# Patient Record
Sex: FEMALE | Race: OTHER | Employment: UNEMPLOYED | URBAN - METROPOLITAN AREA
[De-identification: names, ages, dates, MRNs, and addresses within clinical notes are randomized per-mention and may not be internally consistent; named-entity substitution may affect disease eponyms.]

---

## 2017-08-15 ENCOUNTER — OFFICE VISIT (OUTPATIENT)
Dept: FAMILY MEDICINE CLINIC | Facility: CLINIC | Age: 47
End: 2017-08-15

## 2017-08-15 VITALS
DIASTOLIC BLOOD PRESSURE: 83 MMHG | HEIGHT: 60.5 IN | TEMPERATURE: 99 F | WEIGHT: 189.63 LBS | RESPIRATION RATE: 16 BRPM | HEART RATE: 83 BPM | SYSTOLIC BLOOD PRESSURE: 123 MMHG | BODY MASS INDEX: 36.27 KG/M2

## 2017-08-15 DIAGNOSIS — Z01.00 ENCOUNTER FOR VISION SCREENING: ICD-10-CM

## 2017-08-15 DIAGNOSIS — H93.11 TINNITUS, RIGHT: ICD-10-CM

## 2017-08-15 DIAGNOSIS — Z12.4 CERVICAL CANCER SCREENING: ICD-10-CM

## 2017-08-15 DIAGNOSIS — H91.91 HEARING LOSS OF RIGHT EAR, UNSPECIFIED HEARING LOSS TYPE: ICD-10-CM

## 2017-08-15 DIAGNOSIS — L60.8 DISCOLORED NAILS: ICD-10-CM

## 2017-08-15 DIAGNOSIS — Z00.00 ADULT GENERAL MEDICAL EXAM: Primary | ICD-10-CM

## 2017-08-15 PROBLEM — H93.19 TINNITUS: Status: ACTIVE | Noted: 2017-08-15

## 2017-08-15 PROCEDURE — 99212 OFFICE O/P EST SF 10 MIN: CPT | Performed by: FAMILY MEDICINE

## 2017-08-15 PROCEDURE — 99386 PREV VISIT NEW AGE 40-64: CPT | Performed by: FAMILY MEDICINE

## 2017-08-15 NOTE — PROGRESS NOTES
Patient ID: Jessy Watt Allen is a 52year old female.     HPI  Patient presents with:  Routine Physical  She does not have a gynecologist.  She did have a mammogram done in May 2017 and actually had a biopsy done as she was worried even thoug well-nourished. No distress. HENT:   Head: Normocephalic.    Right Ear: Tympanic membrane and ear canal normal.   Left Ear: Tympanic membrane and ear canal normal.   Mouth/Throat: Oropharynx is clear and moist and mucous membranes are normal.   Eyes: Conj DO  8/15/2017

## 2017-08-19 ENCOUNTER — LAB ENCOUNTER (OUTPATIENT)
Dept: LAB | Facility: HOSPITAL | Age: 47
End: 2017-08-19
Attending: FAMILY MEDICINE
Payer: MEDICAID

## 2017-08-19 DIAGNOSIS — Z00.00 ADULT GENERAL MEDICAL EXAM: ICD-10-CM

## 2017-08-19 LAB
ALBUMIN SERPL BCP-MCNC: 3.9 G/DL (ref 3.5–4.8)
ALBUMIN/GLOB SERPL: 1.4 {RATIO} (ref 1–2)
ALP SERPL-CCNC: 58 U/L (ref 32–100)
ALT SERPL-CCNC: 19 U/L (ref 14–54)
ANION GAP SERPL CALC-SCNC: 7 MMOL/L (ref 0–18)
AST SERPL-CCNC: 20 U/L (ref 15–41)
BASOPHILS # BLD: 0 K/UL (ref 0–0.2)
BASOPHILS NFR BLD: 1 %
BILIRUB SERPL-MCNC: 1.1 MG/DL (ref 0.3–1.2)
BUN SERPL-MCNC: 13 MG/DL (ref 8–20)
BUN/CREAT SERPL: 21.7 (ref 10–20)
CALCIUM SERPL-MCNC: 9.1 MG/DL (ref 8.5–10.5)
CHLORIDE SERPL-SCNC: 105 MMOL/L (ref 95–110)
CHOLEST SERPL-MCNC: 169 MG/DL (ref 110–200)
CO2 SERPL-SCNC: 27 MMOL/L (ref 22–32)
CREAT SERPL-MCNC: 0.6 MG/DL (ref 0.5–1.5)
EOSINOPHIL # BLD: 0.2 K/UL (ref 0–0.7)
EOSINOPHIL NFR BLD: 3 %
ERYTHROCYTE [DISTWIDTH] IN BLOOD BY AUTOMATED COUNT: 13.4 % (ref 11–15)
GLOBULIN PLAS-MCNC: 2.8 G/DL (ref 2.5–3.7)
GLUCOSE SERPL-MCNC: 106 MG/DL (ref 70–99)
HCT VFR BLD AUTO: 41.6 % (ref 35–48)
HDLC SERPL-MCNC: 44 MG/DL
HGB BLD-MCNC: 13.9 G/DL (ref 12–16)
LDLC SERPL CALC-MCNC: 114 MG/DL (ref 0–99)
LYMPHOCYTES # BLD: 1.1 K/UL (ref 1–4)
LYMPHOCYTES NFR BLD: 18 %
MCH RBC QN AUTO: 28.7 PG (ref 27–32)
MCHC RBC AUTO-ENTMCNC: 33.5 G/DL (ref 32–37)
MCV RBC AUTO: 85.7 FL (ref 80–100)
MONOCYTES # BLD: 0.4 K/UL (ref 0–1)
MONOCYTES NFR BLD: 7 %
NEUTROPHILS # BLD AUTO: 4.4 K/UL (ref 1.8–7.7)
NEUTROPHILS NFR BLD: 71 %
NONHDLC SERPL-MCNC: 125 MG/DL
OSMOLALITY UR CALC.SUM OF ELEC: 289 MOSM/KG (ref 275–295)
PLATELET # BLD AUTO: 260 K/UL (ref 140–400)
PMV BLD AUTO: 8.2 FL (ref 7.4–10.3)
POTASSIUM SERPL-SCNC: 4.9 MMOL/L (ref 3.3–5.1)
PROT SERPL-MCNC: 6.7 G/DL (ref 5.9–8.4)
RBC # BLD AUTO: 4.85 M/UL (ref 3.7–5.4)
SODIUM SERPL-SCNC: 139 MMOL/L (ref 136–144)
TRIGL SERPL-MCNC: 57 MG/DL (ref 1–149)
TSH SERPL-ACNC: 3.29 UIU/ML (ref 0.45–5.33)
WBC # BLD AUTO: 6.2 K/UL (ref 4–11)

## 2017-08-19 PROCEDURE — 85025 COMPLETE CBC W/AUTO DIFF WBC: CPT

## 2017-08-19 PROCEDURE — 80061 LIPID PANEL: CPT

## 2017-08-19 PROCEDURE — 36415 COLL VENOUS BLD VENIPUNCTURE: CPT

## 2017-08-19 PROCEDURE — 84443 ASSAY THYROID STIM HORMONE: CPT

## 2017-08-19 PROCEDURE — 80053 COMPREHEN METABOLIC PANEL: CPT

## 2017-08-29 ENCOUNTER — OFFICE VISIT (OUTPATIENT)
Dept: OBGYN CLINIC | Facility: CLINIC | Age: 47
End: 2017-08-29

## 2017-08-29 VITALS
WEIGHT: 190 LBS | HEART RATE: 69 BPM | DIASTOLIC BLOOD PRESSURE: 85 MMHG | SYSTOLIC BLOOD PRESSURE: 134 MMHG | BODY MASS INDEX: 37 KG/M2

## 2017-08-29 DIAGNOSIS — R92.8 ABNORMAL MAMMOGRAM OF RIGHT BREAST: ICD-10-CM

## 2017-08-29 DIAGNOSIS — R32 URINARY INCONTINENCE, UNSPECIFIED TYPE: ICD-10-CM

## 2017-08-29 DIAGNOSIS — Z01.419 WOMEN'S ANNUAL ROUTINE GYNECOLOGICAL EXAMINATION: Primary | ICD-10-CM

## 2017-08-29 DIAGNOSIS — Z12.31 SCREENING MAMMOGRAM, ENCOUNTER FOR: ICD-10-CM

## 2017-08-29 DIAGNOSIS — N89.8 VAGINAL ITCHING: ICD-10-CM

## 2017-08-29 PROCEDURE — 99386 PREV VISIT NEW AGE 40-64: CPT | Performed by: OBSTETRICS & GYNECOLOGY

## 2017-08-30 NOTE — PROGRESS NOTES
Annual Gyn Exam    HPI Ms. Jacklyn iSnha is a new patient to me and is here for an annual gynecologic evaluation. She has a history of an abnormal mammogram with a normal diagnostic mammogram and breast biopsy done at [facility].     OB History urinary incontinence. Patient had a hysterectomy for benign indications. Neurological: Negative for headaches. Physical Exam  /85   Pulse 69   Wt 190 lb (86.2 kg)   Breastfeeding? No   BMI 36.50 kg/m²     Assessment and Plan  1.  Women's kristan

## 2017-08-31 ENCOUNTER — OFFICE VISIT (OUTPATIENT)
Dept: AUDIOLOGY | Facility: CLINIC | Age: 47
End: 2017-08-31

## 2017-08-31 ENCOUNTER — OFFICE VISIT (OUTPATIENT)
Dept: OTOLARYNGOLOGY | Facility: CLINIC | Age: 47
End: 2017-08-31

## 2017-08-31 VITALS
WEIGHT: 190 LBS | DIASTOLIC BLOOD PRESSURE: 62 MMHG | BODY MASS INDEX: 37 KG/M2 | SYSTOLIC BLOOD PRESSURE: 118 MMHG | TEMPERATURE: 98 F

## 2017-08-31 DIAGNOSIS — H93.11 TINNITUS, RIGHT: ICD-10-CM

## 2017-08-31 DIAGNOSIS — H90.41 SENSORINEURAL HEARING LOSS (SNHL) OF RIGHT EAR WITH UNRESTRICTED HEARING OF LEFT EAR: Primary | ICD-10-CM

## 2017-08-31 DIAGNOSIS — H90.41 SENSORINEURAL HEARING LOSS (SNHL) OF RIGHT EAR WITH UNRESTRICTED HEARING OF LEFT EAR: ICD-10-CM

## 2017-08-31 LAB
GENITAL VAGINOSIS SCREEN: NEGATIVE
TRICHOMONAS SCREEN: NEGATIVE

## 2017-08-31 PROCEDURE — 99212 OFFICE O/P EST SF 10 MIN: CPT | Performed by: OTOLARYNGOLOGY

## 2017-08-31 PROCEDURE — 92567 TYMPANOMETRY: CPT | Performed by: AUDIOLOGIST

## 2017-08-31 PROCEDURE — 92557 COMPREHENSIVE HEARING TEST: CPT | Performed by: AUDIOLOGIST

## 2017-08-31 PROCEDURE — 99243 OFF/OP CNSLTJ NEW/EST LOW 30: CPT | Performed by: OTOLARYNGOLOGY

## 2017-08-31 NOTE — PROGRESS NOTES
AUDIOGRAM     Ricky Early Darlene Villa was referred for testing by Tomás Fay for decreased hearing in the right ear and tinnitus. 2/19/1970  [id number]        Otoscopic inspection: right ear no cerumen; left ear no cerumen.        Te

## 2017-08-31 NOTE — PROGRESS NOTES
Bertin Quique Allen is a 52year old female.   Patient presents with:  Hearing Loss: patient presents for ringing and some hearing loss started about 2 years ago has used some drops       HISTORY OF PRESENT ILLNESS    She presents with a sudden h Constitutional Negative Fatigue, fever and weight loss. ENMT Negative Drooling. Eyes Negative Blurred vision and vision changes. Respiratory Negative Dyspnea and wheezing. Cardio Negative Chest pain, irregular heartbeat/palpitations and syncope. Left: Normal.     No current outpatient prescriptions on file. ASSESSMENT AND PLAN    1.  Sensorineural hearing loss (SNHL) of right ear with unrestricted hearing of left ear  Previously seen by Dr. Eduardo Whipple and recommendation of bone-anchored hearing aid

## 2017-09-01 ENCOUNTER — OFFICE VISIT (OUTPATIENT)
Dept: PHYSICAL THERAPY | Facility: HOSPITAL | Age: 47
End: 2017-09-01
Attending: OBSTETRICS & GYNECOLOGY
Payer: MEDICAID

## 2017-09-01 DIAGNOSIS — R32 URINARY INCONTINENCE, UNSPECIFIED TYPE: ICD-10-CM

## 2017-09-01 PROCEDURE — 97110 THERAPEUTIC EXERCISES: CPT

## 2017-09-01 PROCEDURE — 97530 THERAPEUTIC ACTIVITIES: CPT

## 2017-09-01 PROCEDURE — 97161 PT EVAL LOW COMPLEX 20 MIN: CPT

## 2017-09-01 NOTE — PROGRESS NOTES
PELVIC FLOOR EVALUATION:   Referring Physician: Dr. Ramin Gregory  Diagnosis: Urinary incontinence, unspecified type (R32), Stress incontinence of urine [N39.3]    Date of Onset: 1996, 2017 Date of Service: 9/1/2017     PATIENT SUMMARY   Mary May palpated and able to visualize significant for mild pelvic organ prolapse of the bladder due to previous hysterectomy in 2009.   Other findings include weakness of the lumbo-pelvic support muscles including B hip abductors, extensors, internal rotators and yes Partial hysterectomy     Smoker yes 1-2 cigarettes per day    Drinker yes 1-2 glasses per day   Diabetes no    Hypertension no Borderline    Hypercholestemia no Borderline    Hyperlipidemia no      OBSTETRIC/GYNECOLOGIC HISTORY  : 2  Para: 2 - she reports this will be fine or she will seek guidance from PCP  Physical Assistance Needed: Independent     POSTURE  Increased lumbar lordosis, level pelvis     RANGE OF MOTION  Lumbar AROM/PROM screen:  WNL without pain in all planes  Hip AROM/PROM scr restriction, trigger point and pain minimal restriction, trigger point and pain    Coccygeus WNL WNL    Piriformis WNL WNL    Obturator internus WNL WNL      Strength: 3+/5 MMT grade  Endurance: 3-4 seconds  Repetitions: 4 times  Quick contractions:  Di Augustine

## 2017-09-07 ENCOUNTER — OFFICE VISIT (OUTPATIENT)
Dept: PHYSICAL THERAPY | Facility: HOSPITAL | Age: 47
End: 2017-09-07
Attending: OBSTETRICS & GYNECOLOGY
Payer: MEDICAID

## 2017-09-07 DIAGNOSIS — R32 URINARY INCONTINENCE, UNSPECIFIED TYPE: ICD-10-CM

## 2017-09-07 PROCEDURE — 97110 THERAPEUTIC EXERCISES: CPT

## 2017-09-07 NOTE — PROGRESS NOTES
Diagnosis: Stress incontinence of urine [N39.3]  Authorized # of Visits:  4 per [facility] community          Next MD visit: none scheduled     Referring Physician: Dr. Nuria Mai: standard         Precautions: n/a           Medication Changes since last vi LORENA     Skilled intervention will include: Soft tissue mobilization, stretching, strengthening, neuromuscular re-education, manual joint mobilization, self care, education and HEP     Reccommended frequency/duration: 1x/week X 10 VISITS: by 10/30/17     Pa retraining next session as able to tolerate and progress abdominal progression as able/   Therapist: Alexandr Hines PT, DPT, RIC  9/7/2017 9:56 AM    Charges: TE x3

## 2017-09-14 ENCOUNTER — OFFICE VISIT (OUTPATIENT)
Dept: PHYSICAL THERAPY | Facility: HOSPITAL | Age: 47
End: 2017-09-14
Attending: OBSTETRICS & GYNECOLOGY
Payer: MEDICAID

## 2017-09-14 DIAGNOSIS — R32 URINARY INCONTINENCE, UNSPECIFIED TYPE: ICD-10-CM

## 2017-09-14 PROCEDURE — 97112 NEUROMUSCULAR REEDUCATION: CPT

## 2017-09-14 PROCEDURE — 97110 THERAPEUTIC EXERCISES: CPT

## 2017-09-14 NOTE — PROGRESS NOTES
Diagnosis: Stress incontinence of urine [N39.3]  Authorized # of Visits:  4 per [facility] community          Next MD visit: none scheduled     Referring Physician: Dr. Cristiane Alves: standard         Precautions: n/a           Medication Changes since last vi LORENA     Skilled intervention will include: Soft tissue mobilization, stretching, strengthening, neuromuscular re-education, manual joint mobilization, self care, education and HEP     Reccommended frequency/duration: 1x/week X 10 VISITS: by 10/30/17     Pa performing HEP regularly and has self progressed to 5-6 second holds. Focused session on lumbo-pelvic stabilization activities with aims to increase over-flow pelvic floor strengthening.   Tolerated all activities well and issued for HEP progression to be

## 2017-09-19 ENCOUNTER — OFFICE VISIT (OUTPATIENT)
Dept: PODIATRY CLINIC | Facility: CLINIC | Age: 47
End: 2017-09-19

## 2017-09-19 DIAGNOSIS — L03.031 ONYCHIA OF TOE OF RIGHT FOOT: Primary | ICD-10-CM

## 2017-09-19 PROCEDURE — 99242 OFF/OP CONSLTJ NEW/EST SF 20: CPT | Performed by: PODIATRIST

## 2017-09-19 PROCEDURE — 99212 OFFICE O/P EST SF 10 MIN: CPT | Performed by: PODIATRIST

## 2017-09-19 NOTE — PROGRESS NOTES
HPI:    Patient ID: Zuhair Villa is a 52year old female. HPI  This pleasant 49-year-old female presents as a new patient to me on consult from  Paigelyndsey Call. Patient's concern is the right great toenail.   She has noticed some discolorati

## 2017-09-21 ENCOUNTER — OFFICE VISIT (OUTPATIENT)
Dept: PHYSICAL THERAPY | Facility: HOSPITAL | Age: 47
End: 2017-09-21
Attending: OBSTETRICS & GYNECOLOGY
Payer: MEDICAID

## 2017-09-21 DIAGNOSIS — R32 URINARY INCONTINENCE, UNSPECIFIED TYPE: ICD-10-CM

## 2017-09-21 PROCEDURE — 97112 NEUROMUSCULAR REEDUCATION: CPT

## 2017-09-21 PROCEDURE — 97110 THERAPEUTIC EXERCISES: CPT

## 2017-09-21 NOTE — PROGRESS NOTES
Diagnosis: Stress incontinence of urine [N39.3]  Authorized # of Visits:  4 per [facility] community          Next MD visit: none scheduled     Referring Physician: Dr. Abundio Barrera: standard         Precautions: n/a           Medication Changes since last vi LORENA     Skilled intervention will include: Soft tissue mobilization, stretching, strengthening, neuromuscular re-education, manual joint mobilization, self care, education and HEP     Reccommended frequency/duration: 1x/week X 10 VISITS: by 10/30/17     Pa endurance  2. PFM Quicks   3. PFM Coord L2   4. PFM Coord L3 descending  5. PFM Coord L3 ascending  6. PFM Coord L4 ascending  7.  PFM Coord L4 descending     Self-Care                      9/7/2017: Pt returns to clinic without pain currently and reports n without difficulty with external palpation. Tolerated all activities well and issued for HEP progression to be performed once daily. Plan to progress pelvic floor retraining next session as able to tolerate and progress abdominal progression as able.    Armando Pulido

## 2017-09-28 ENCOUNTER — OFFICE VISIT (OUTPATIENT)
Dept: PHYSICAL THERAPY | Facility: HOSPITAL | Age: 47
End: 2017-09-28
Attending: OBSTETRICS & GYNECOLOGY
Payer: MEDICAID

## 2017-09-28 DIAGNOSIS — R32 URINARY INCONTINENCE, UNSPECIFIED TYPE: ICD-10-CM

## 2017-09-28 PROCEDURE — 97140 MANUAL THERAPY 1/> REGIONS: CPT

## 2017-09-28 NOTE — PROGRESS NOTES
Diagnosis: Stress incontinence of urine [N39.3]  Authorized # of Visits:  4 per [facility] community          Next MD visit: none scheduled     Referring Physician: Dr. Elif Lima: standard         Precautions: n/a           Medication Changes since last vi LORENA     Skilled intervention will include: Soft tissue mobilization, stretching, strengthening, neuromuscular re-education, manual joint mobilization, self care, education and HEP     Reccommended frequency/duration: 1x/week X 10 VISITS: by 10/30/17     Pa floors   Pelvic floor assessment externally   8. 3\" x 3 floors up/down x10  1. 5x c 10\" hold  2. 10x quick x5   1. PFM endurance  2. PFM Quicks   3. PFM Coord L2   4. PFM Coord L3 descending  5. PFM Coord L3 ascending  6. PFM Coord L4 ascending  7.  PFM C increased difficulty of HEP issued today before attempting again, verbalized understanding. Able to progress pelvic floor retraining without difficulty with external palpation.  Tolerated all activities well and issued for HEP progression to be performed on understanding.   Objective findings demonstrate WNL pelvic floor strength and coordination now with external palpation and pt is safe to return to normal activities with gym classes etc.  Instructed pt to perform PFM HEP at least twice daily for maintenance

## 2017-10-05 ENCOUNTER — APPOINTMENT (OUTPATIENT)
Dept: PHYSICAL THERAPY | Facility: HOSPITAL | Age: 47
End: 2017-10-05
Attending: OBSTETRICS & GYNECOLOGY
Payer: MEDICAID

## 2017-10-12 ENCOUNTER — OFFICE VISIT (OUTPATIENT)
Dept: OPTOMETRY | Facility: CLINIC | Age: 47
End: 2017-10-12

## 2017-10-12 DIAGNOSIS — H52.4 HYPEROPIA WITH ASTIGMATISM AND PRESBYOPIA, BILATERAL: Primary | ICD-10-CM

## 2017-10-12 DIAGNOSIS — H52.03 HYPEROPIA WITH ASTIGMATISM AND PRESBYOPIA, BILATERAL: Primary | ICD-10-CM

## 2017-10-12 DIAGNOSIS — H52.203 HYPEROPIA WITH ASTIGMATISM AND PRESBYOPIA, BILATERAL: Primary | ICD-10-CM

## 2017-10-12 PROCEDURE — 92015 DETERMINE REFRACTIVE STATE: CPT | Performed by: OPTOMETRIST

## 2017-10-12 PROCEDURE — 92004 COMPRE OPH EXAM NEW PT 1/>: CPT | Performed by: OPTOMETRIST

## 2017-10-12 RX ORDER — IBUPROFEN 600 MG/1
TABLET ORAL
Refills: 0 | COMMUNITY
Start: 2017-09-30 | End: 2018-04-30

## 2017-10-12 RX ORDER — CHLORHEXIDINE GLUCONATE 0.12 MG/ML
RINSE ORAL
Refills: 0 | COMMUNITY
Start: 2017-09-30 | End: 2018-04-30

## 2017-10-12 NOTE — PROGRESS NOTES
Zuhair Vázquez Allen is a 52year old female. HPI:     HPI     Patient is in for an annual eye exam. Has glasses that she does not wear because it is hard to focus with them and they make her feel as if the ground is coming up at her.  Glasses cc 20/25 20/25    Near cc 8pt 8pt    Correction:  Glasses          Tonometry (Non-contact air puff, 10:45 AM)       Right Left    Pressure 14 17          Pupils       Pupils    Right PERRL    Left PERRL          Visual Fields       Left Right     Full Full placed in this encounter.       Meds This Visit:    No prescriptions requested or ordered in this encounter        Follow up instructions:  Return in about 2 years (around 10/12/2019) for Eye Exam.    10/12/2017  Scribed by: Yasmeen Perkins

## 2017-10-12 NOTE — PATIENT INSTRUCTIONS
Hyperopia with astigmatism and presbyopia, bilateral  New glasses RX given to update as needed. I advised patient in order to see at near with glasses she needs to go with a bifocal--recommend progressives.  I told patient there will be some adaptation to R

## 2017-10-12 NOTE — ASSESSMENT & PLAN NOTE
New glasses RX given to update as needed. I advised patient in order to see at near with glasses she needs to go with a bifocal--recommend progressives. I told patient there will be some adaptation to RX but it will help her see far and near.

## 2017-10-17 ENCOUNTER — HOSPITAL ENCOUNTER (OUTPATIENT)
Dept: GENERAL RADIOLOGY | Age: 47
Discharge: HOME | End: 2017-10-17
Attending: FAMILY MEDICINE
Payer: MEDICAID

## 2017-10-17 ENCOUNTER — OFFICE VISIT (OUTPATIENT)
Dept: FAMILY MEDICINE CLINIC | Facility: CLINIC | Age: 47
End: 2017-10-17

## 2017-10-17 VITALS
SYSTOLIC BLOOD PRESSURE: 118 MMHG | HEART RATE: 66 BPM | HEIGHT: 60.5 IN | WEIGHT: 192 LBS | TEMPERATURE: 99 F | BODY MASS INDEX: 36.72 KG/M2 | DIASTOLIC BLOOD PRESSURE: 79 MMHG

## 2017-10-17 DIAGNOSIS — M22.2X2 PATELLOFEMORAL PAIN SYNDROME OF LEFT KNEE: ICD-10-CM

## 2017-10-17 DIAGNOSIS — M25.562 ACUTE PAIN OF LEFT KNEE: ICD-10-CM

## 2017-10-17 DIAGNOSIS — M25.562 ACUTE PAIN OF LEFT KNEE: Primary | ICD-10-CM

## 2017-10-17 PROCEDURE — 99212 OFFICE O/P EST SF 10 MIN: CPT | Performed by: FAMILY MEDICINE

## 2017-10-17 PROCEDURE — 73564 X-RAY EXAM KNEE 4 OR MORE: CPT | Performed by: FAMILY MEDICINE

## 2017-10-17 PROCEDURE — 99214 OFFICE O/P EST MOD 30 MIN: CPT | Performed by: FAMILY MEDICINE

## 2017-10-17 RX ORDER — NABUMETONE 750 MG/1
750 TABLET, FILM COATED ORAL 2 TIMES DAILY
Qty: 60 TABLET | Refills: 0 | Status: SHIPPED | OUTPATIENT
Start: 2017-10-17 | End: 2018-04-30

## 2017-10-17 NOTE — PROGRESS NOTES
Patient ID: Bryant Murray Allen is a 52year old female. HPI  Patient presents with:  Knee Pain    She has been having some left knee pain for about 3 or 4 months.   She states she was doing [employer] class and may have twisted her knee and was u distress.   KNEE EXAM: LEFT    Range of Motion 0-150 Degrees   Effusion None   Swelling None   Erythema None   Atrophy None       Strength       Quadriceps 5/5      Hamstrings 5/5       Joint line tenderness       Medial ++      Lateral None      Pes anseri

## 2017-10-23 ENCOUNTER — HOSPITAL ENCOUNTER (OUTPATIENT)
Dept: ULTRASOUND IMAGING | Facility: HOSPITAL | Age: 47
Discharge: HOME | End: 2017-10-23
Attending: OBSTETRICS & GYNECOLOGY
Payer: MEDICAID

## 2017-10-23 ENCOUNTER — HOSPITAL ENCOUNTER (OUTPATIENT)
Dept: MAMMOGRAPHY | Facility: HOSPITAL | Age: 47
Discharge: HOME | End: 2017-10-23
Attending: OBSTETRICS & GYNECOLOGY
Payer: MEDICAID

## 2017-10-23 DIAGNOSIS — R92.8 ABNORMAL MAMMOGRAM OF RIGHT BREAST: ICD-10-CM

## 2017-10-23 DIAGNOSIS — Z12.31 SCREENING MAMMOGRAM, ENCOUNTER FOR: ICD-10-CM

## 2017-10-23 PROCEDURE — 77066 DX MAMMO INCL CAD BI: CPT | Performed by: OBSTETRICS & GYNECOLOGY

## 2017-10-23 PROCEDURE — 76642 ULTRASOUND BREAST LIMITED: CPT | Performed by: OBSTETRICS & GYNECOLOGY

## 2017-10-24 ENCOUNTER — OFFICE VISIT (OUTPATIENT)
Dept: PHYSICAL THERAPY | Age: 47
End: 2017-10-24
Attending: FAMILY MEDICINE
Payer: MEDICAID

## 2017-10-24 DIAGNOSIS — M22.2X2 PATELLOFEMORAL PAIN SYNDROME OF LEFT KNEE: ICD-10-CM

## 2017-10-24 DIAGNOSIS — M25.562 ACUTE PAIN OF LEFT KNEE: ICD-10-CM

## 2017-10-24 PROCEDURE — 97530 THERAPEUTIC ACTIVITIES: CPT | Performed by: PHYSICAL THERAPIST

## 2017-10-24 PROCEDURE — 97162 PT EVAL MOD COMPLEX 30 MIN: CPT | Performed by: PHYSICAL THERAPIST

## 2017-10-24 NOTE — PROGRESS NOTES
P.T. EVALUATION:   Referring Physician: Dr. Ernestine Sexton  Diagnosis: Acute pain of left knee (M25.562)  Patellofemoral pain syndrome of left knee (M22.2X2)    Date of Onset: 6/15/2017 Date of Service: 10/24/2017     PATIENT 87 [address] [facility] without an assistive device. Goes down stairs one step at a time. Today’s Treatment and Response:  Patient education provided on PT eval findings, treatment plan, goals, HEP. Patient received today:  - Therapeutic Activity: instructed on HEP.  Handouts

## 2017-10-31 ENCOUNTER — OFFICE VISIT (OUTPATIENT)
Dept: PHYSICAL THERAPY | Age: 47
End: 2017-10-31
Attending: FAMILY MEDICINE
Payer: MEDICAID

## 2017-10-31 PROCEDURE — 97110 THERAPEUTIC EXERCISES: CPT

## 2017-10-31 NOTE — PROGRESS NOTES
Diagnosis: Acute pain of left knee (M25.562)  Patellofemoral pain syndrome of left knee (M22.2X2)          # of Visits:  2/6 [facility] [address] [place] [address] (EXP. 11/23/17)         Next MD visit: none scheduled  Fall Risk: standard         Precautions: n/a          Medicat

## 2017-11-02 ENCOUNTER — OFFICE VISIT (OUTPATIENT)
Dept: PHYSICAL THERAPY | Age: 47
End: 2017-11-02
Attending: FAMILY MEDICINE
Payer: MEDICAID

## 2017-11-02 PROCEDURE — 97110 THERAPEUTIC EXERCISES: CPT | Performed by: PHYSICAL THERAPIST

## 2017-11-02 NOTE — PROGRESS NOTES
Diagnosis: Acute pain of left knee (M25.562)  Patellofemoral pain syndrome of left knee (M22.2X2)          # of Visits:  2/6 [facility] [address] [place] [address] (EXP. 11/23/17)         Next MD visit: none scheduled  Fall Risk: standard         Precautions: n/a          Medicat

## 2017-11-07 ENCOUNTER — OFFICE VISIT (OUTPATIENT)
Dept: PHYSICAL THERAPY | Age: 47
End: 2017-11-07
Attending: FAMILY MEDICINE
Payer: MEDICAID

## 2017-11-07 PROCEDURE — 97110 THERAPEUTIC EXERCISES: CPT

## 2017-11-07 NOTE — PROGRESS NOTES
Diagnosis: Acute pain of left knee (M25.562)  Patellofemoral pain syndrome of left knee (M22.2X2)          # of Visits:  4/6 [facility] [address] [place] [address] (EXP. 11/23/17)         Next MD visit: none scheduled  Fall Risk: standard         Precautions: n/a          Medicat

## 2017-11-09 ENCOUNTER — OFFICE VISIT (OUTPATIENT)
Dept: PHYSICAL THERAPY | Age: 47
End: 2017-11-09
Attending: FAMILY MEDICINE
Payer: MEDICAID

## 2017-11-09 PROCEDURE — 97110 THERAPEUTIC EXERCISES: CPT

## 2017-11-09 NOTE — PROGRESS NOTES
Diagnosis: Acute pain of left knee (M25.562)  Patellofemoral pain syndrome of left knee (M22.2X2)          # of Visits:  5/6 [facility] [address] [place] [address] (EXP. 11/23/17)         Next MD visit: none scheduled  Fall Risk: standard         Precautions: n/a          Medicat Charges: EX 3       Total Timed Treatment: 45 min  Total Treatment Time: 45 min

## 2017-11-16 ENCOUNTER — OFFICE VISIT (OUTPATIENT)
Dept: PHYSICAL THERAPY | Age: 47
End: 2017-11-16
Attending: FAMILY MEDICINE
Payer: MEDICAID

## 2017-11-16 PROCEDURE — 97110 THERAPEUTIC EXERCISES: CPT

## 2017-11-16 NOTE — PROGRESS NOTES
Diagnosis: Acute pain of left knee (M25.562)  Patellofemoral pain syndrome of left knee (M22.2X2)          # of Visits:  6/6 [facility] [address] (EXP. 11/23/17)         Next MD visit: none scheduled  Fall Risk: standard         Precautions: n/a          Medicat referral.       Charges: EX 3       Total Timed Treatment: 45 min  Total Treatment Time: 45 min

## 2017-11-16 NOTE — PROGRESS NOTES
Physical Therapy Progress Report    Patient Name: Bruce Mcdaniel, MRN: [id number]  Date: 11/16/2017  Referring Physician: Yanira Tinoco    Diagnosis: Acute pain of left knee (M25.562)  Patellofemoral pain syndrome of left knee (M22.2X options and has agreed to actively participate in planning and for this course of care. Thank you for your referral. If you have any questions, please contact me at Dept: [phone].     Sincerely,  Electronically signed by therapist: JORJE Biswas

## 2017-11-29 ENCOUNTER — OFFICE VISIT (OUTPATIENT)
Dept: PHYSICAL THERAPY | Age: 47
End: 2017-11-29
Attending: FAMILY MEDICINE
Payer: MEDICAID

## 2017-11-29 PROCEDURE — 97110 THERAPEUTIC EXERCISES: CPT

## 2017-11-29 NOTE — PROGRESS NOTES
Diagnosis: Acute pain of left knee (M25.562)  Patellofemoral pain syndrome of left knee (M22.2X2)          # of Visits:  7/8 [facility] [address] (EXP. 11/23/17)         Next MD visit: none scheduled  Fall Risk: standard         Precautions: n/a          Medicat min  Total Treatment Time: 45 min

## 2017-11-30 ENCOUNTER — APPOINTMENT (OUTPATIENT)
Dept: PHYSICAL THERAPY | Age: 47
End: 2017-11-30
Attending: FAMILY MEDICINE
Payer: MEDICAID

## 2017-12-05 ENCOUNTER — APPOINTMENT (OUTPATIENT)
Dept: PHYSICAL THERAPY | Age: 47
End: 2017-12-05
Attending: FAMILY MEDICINE
Payer: MEDICAID

## 2017-12-05 ENCOUNTER — TELEPHONE (OUTPATIENT)
Dept: PHYSICAL THERAPY | Age: 47
End: 2017-12-05

## 2017-12-13 ENCOUNTER — OFFICE VISIT (OUTPATIENT)
Dept: PHYSICAL THERAPY | Age: 47
End: 2017-12-13
Attending: FAMILY MEDICINE
Payer: MEDICAID

## 2017-12-13 PROCEDURE — 97110 THERAPEUTIC EXERCISES: CPT

## 2017-12-13 NOTE — PROGRESS NOTES
Diagnosis: Acute pain of left knee (M25.562)  Patellofemoral pain syndrome of left knee (M22.2X2)          # of Visits:  8/8 [facility] [address] (EXP. 11/23/17)         Next MD visit: none scheduled  Fall Risk: standard         Precautions: n/a          Medicat Time: 45 min

## 2018-03-23 ENCOUNTER — TELEPHONE (OUTPATIENT)
Dept: PEDIATRICS CLINIC | Facility: CLINIC | Age: 48
End: 2018-03-23

## 2018-03-23 NOTE — TELEPHONE ENCOUNTER
Would like to speak to nurse to find out if on 10/23/17 Pt's breast ultrasound & mammogram was pre-authorized.

## 2018-03-23 NOTE — TELEPHONE ENCOUNTER
NA  Case Number: [id number]  Status: Denied  Approval Date:   Service Code: [id number]  Service Description: [address] [place] [address]  Site Name: [facility]  Expiration Date:   Date Last Updated: 11/6/2017 10:59:52 AM  Correspondenc

## 2018-03-23 NOTE — TELEPHONE ENCOUNTER
Submitted a new case for [id number]. Case Number: [id number]  Clinicals to be called in    [address]10 is not a covered service from [place].

## 2018-03-26 NOTE — TELEPHONE ENCOUNTER
[place] and when she comes back she will be obtaining results of breast mammogram/US done at [facility] of left breast. Had no further questions.

## 2018-04-30 ENCOUNTER — HOSPITAL ENCOUNTER (OUTPATIENT)
Dept: GENERAL RADIOLOGY | Age: 48
Discharge: HOME | End: 2018-04-30
Attending: FAMILY MEDICINE
Payer: MEDICAID

## 2018-04-30 ENCOUNTER — OFFICE VISIT (OUTPATIENT)
Dept: FAMILY MEDICINE CLINIC | Facility: CLINIC | Age: 48
End: 2018-04-30

## 2018-04-30 VITALS
TEMPERATURE: 99 F | HEIGHT: 60.5 IN | SYSTOLIC BLOOD PRESSURE: 131 MMHG | WEIGHT: 190 LBS | HEART RATE: 70 BPM | BODY MASS INDEX: 36.34 KG/M2 | DIASTOLIC BLOOD PRESSURE: 88 MMHG

## 2018-04-30 DIAGNOSIS — M25.562 CHRONIC PAIN OF LEFT KNEE: Primary | ICD-10-CM

## 2018-04-30 DIAGNOSIS — R53.83 LETHARGIC: ICD-10-CM

## 2018-04-30 DIAGNOSIS — G89.29 CHRONIC PAIN OF LEFT KNEE: Primary | ICD-10-CM

## 2018-04-30 DIAGNOSIS — E78.2 MIXED HYPERLIPIDEMIA: ICD-10-CM

## 2018-04-30 DIAGNOSIS — F40.248 OTHER SITUATIONAL TYPE PHOBIA: ICD-10-CM

## 2018-04-30 DIAGNOSIS — M79.644 CHRONIC PAIN OF RIGHT THUMB: ICD-10-CM

## 2018-04-30 DIAGNOSIS — M25.462 EFFUSION OF LEFT KNEE: ICD-10-CM

## 2018-04-30 DIAGNOSIS — Z23 NEED FOR VACCINATION: ICD-10-CM

## 2018-04-30 DIAGNOSIS — R06.83 SNORING: ICD-10-CM

## 2018-04-30 DIAGNOSIS — G47.8 UNREFRESHED BY SLEEP: ICD-10-CM

## 2018-04-30 DIAGNOSIS — G89.29 CHRONIC PAIN OF RIGHT THUMB: ICD-10-CM

## 2018-04-30 DIAGNOSIS — R73.9 HYPERGLYCEMIA: ICD-10-CM

## 2018-04-30 PROCEDURE — 90471 IMMUNIZATION ADMIN: CPT | Performed by: FAMILY MEDICINE

## 2018-04-30 PROCEDURE — 99215 OFFICE O/P EST HI 40 MIN: CPT | Performed by: FAMILY MEDICINE

## 2018-04-30 PROCEDURE — 90715 TDAP VACCINE 7 YRS/> IM: CPT | Performed by: FAMILY MEDICINE

## 2018-04-30 PROCEDURE — 73140 X-RAY EXAM OF FINGER(S): CPT | Performed by: FAMILY MEDICINE

## 2018-04-30 RX ORDER — DIAZEPAM 5 MG/1
TABLET ORAL
Qty: 2 TABLET | Refills: 0 | Status: SHIPPED | OUTPATIENT
Start: 2018-04-30 | End: 2018-05-24

## 2018-04-30 RX ORDER — NAPROXEN 500 MG/1
500 TABLET ORAL 2 TIMES DAILY WITH MEALS
Qty: 42 TABLET | Refills: 0 | Status: SHIPPED | OUTPATIENT
Start: 2018-04-30 | End: 2018-05-21

## 2018-04-30 NOTE — PROGRESS NOTES
Patient ID: Leon Alberto is a 50year old female. HPI  Patient presents with:  Knee Pain        Study Result     PROCEDURE:  XR KNEE, COMPLETE (4 OR MORE VIEWS), LEFT (AGL=24950)     COMPARISON: None.      INDICATIONS:  Pain and swelli Relafen in the past along with physical therapy but has not helped. She also states sometime ago she was told by her prior doctor that she needed a sleep study. She admits to snoring and waking up tired. She feels lethargic at times.   She does not fee Constitutional: Patient is oriented to person, place, and time. Patient appears well-developed and well-nourished. No distress. HENT:   Head: Normocephalic and atraumatic. Neck: Normal range of motion. Neck supple. No thyromegaly present.    Lymphaden ASSESSMENT/PLAN:     Diagnoses and all orders for this visit:    Chronic pain of left knee  -     MRI KNEE, LEFT (CZM=[id number]); Future  -     naproxen 500 MG Oral Tab; Take 1 tablet (500 mg total) by mouth 2 (two) times daily with meals.  (Pain and Inflamma Jhoan Hamilton,   4/30/2018

## 2018-05-08 ENCOUNTER — TELEPHONE (OUTPATIENT)
Dept: FAMILY MEDICINE CLINIC | Facility: CLINIC | Age: 48
End: 2018-05-08

## 2018-05-08 NOTE — TELEPHONE ENCOUNTER
Per pt she had a MRI for her left knee and received a call stating her insurance is not going to cover the MRI and was advised to contact our office to see what can be done or what would be the next step. pls advise.  Thank you    DIANE-English speaker

## 2018-05-08 NOTE — TELEPHONE ENCOUNTER
Let her know that the insurance refuses to cover a left knee MRI. If she has never had a steroid injection into the left knee, have her set this up with me for a  procedure visit as her insurance will only cover that procedure for that visit.   We the will

## 2018-05-10 NOTE — TELEPHONE ENCOUNTER
Patient notified of MD's recommendation. Patient verbalized understanding. She will call back if she decides to have done.

## 2018-05-24 ENCOUNTER — OFFICE VISIT (OUTPATIENT)
Dept: FAMILY MEDICINE CLINIC | Facility: CLINIC | Age: 48
End: 2018-05-24

## 2018-05-24 VITALS
HEIGHT: 60.5 IN | RESPIRATION RATE: 18 BRPM | DIASTOLIC BLOOD PRESSURE: 86 MMHG | BODY MASS INDEX: 35.76 KG/M2 | HEART RATE: 81 BPM | TEMPERATURE: 98 F | WEIGHT: 187 LBS | SYSTOLIC BLOOD PRESSURE: 128 MMHG

## 2018-05-24 DIAGNOSIS — L72.3 SEBACEOUS CYST OF LEFT AXILLA: Primary | ICD-10-CM

## 2018-05-24 DIAGNOSIS — F32.A DEPRESSIVE DISORDER: ICD-10-CM

## 2018-05-24 DIAGNOSIS — G44.209 TENSION HEADACHE: ICD-10-CM

## 2018-05-24 DIAGNOSIS — F51.04 PSYCHOPHYSIOLOGICAL INSOMNIA: ICD-10-CM

## 2018-05-24 PROCEDURE — 99212 OFFICE O/P EST SF 10 MIN: CPT | Performed by: FAMILY MEDICINE

## 2018-05-24 PROCEDURE — 99215 OFFICE O/P EST HI 40 MIN: CPT | Performed by: FAMILY MEDICINE

## 2018-05-24 RX ORDER — ESCITALOPRAM OXALATE 10 MG/1
10 TABLET ORAL DAILY
Qty: 90 TABLET | Refills: 0 | Status: SHIPPED | OUTPATIENT
Start: 2018-05-24 | End: 2018-09-28

## 2018-05-24 RX ORDER — CYCLOBENZAPRINE HCL 10 MG
10 TABLET ORAL NIGHTLY
Qty: 30 TABLET | Refills: 1 | Status: SHIPPED | OUTPATIENT
Start: 2018-05-24 | End: 2018-06-13

## 2018-05-24 NOTE — PROGRESS NOTES
Patient ID: Lora Zarco is a 50year old female. HPI  Patient presents with:  Lump: Patient c/o lump under left armpit that she noticed 2 days ago.  Denies pain  Blood Pressure: Patient states she woke up at 4am due to headache and ch breast bx natalia  05/01/2017: OTHER SURGICAL HISTORY      Comment: right breast biopsy        Current Outpatient Prescriptions:  diazepam (VALIUM) 5 MG Oral Tab Take 1-2 tablets, 15-20 minutes prior to your MRI for claustrophobia.  Disp: 2 tablet Rfl: 0 cope with the pressure that her  is closing on her. I will start her on Lexapro in the morning and then cyclobenzaprine at night which I think will help her tension headache and also allow her to sleep.   Psychophysiological insomnia  -     eugene

## 2018-06-06 ENCOUNTER — TELEPHONE (OUTPATIENT)
Dept: FAMILY MEDICINE CLINIC | Facility: CLINIC | Age: 48
End: 2018-06-06

## 2018-06-07 ENCOUNTER — TELEPHONE (OUTPATIENT)
Dept: FAMILY MEDICINE CLINIC | Facility: CLINIC | Age: 48
End: 2018-06-07

## 2018-06-07 NOTE — TELEPHONE ENCOUNTER
Mammogram is ordered by Dr. Trevor Quiroz to her gynecologist in October 2017. He can order again near 2018 in October.

## 2018-06-07 NOTE — TELEPHONE ENCOUNTER
Spoke with patient informed of procedure of knee injection and transferred call to  to help make appt with dr Curly Goldmann

## 2018-06-07 NOTE — TELEPHONE ENCOUNTER
Pt states MD recommended a steroid injection into the left knee, pt states she has a few questions before setting up a procedure and is requesting a call back.  Please advise

## 2018-06-11 ENCOUNTER — OFFICE VISIT (OUTPATIENT)
Dept: SLEEP CENTER | Age: 48
End: 2018-06-11
Attending: FAMILY MEDICINE
Payer: MEDICAID

## 2018-06-11 DIAGNOSIS — G47.33 OSA (OBSTRUCTIVE SLEEP APNEA): Primary | ICD-10-CM

## 2018-06-11 PROCEDURE — 95806 SLEEP STUDY UNATT&RESP EFFT: CPT

## 2018-06-13 NOTE — PROCEDURES
[facility]  Accredited by the [place] of [employer] (AASM)    PATIENT'S NAME: AZ Rogers   ATTENDING PHYSICIAN: Autumn Min, DO   REFERRING PHYSICIAN: Autumn Min, DO   PATIENT ACCOUNT #: [de-identified] LOC minute. INTERPRETATION:  The data generated from this study is consistent with mild obstructive sleep apnea (ICD-10 code G47.33). RECOMMENDATIONS:    1. Consider CPAP titration. 2.   Weight loss. 3.   Avoid alcohol. 4.   Avoid sedating drug.   5.

## 2018-06-22 ENCOUNTER — OFFICE VISIT (OUTPATIENT)
Dept: FAMILY MEDICINE CLINIC | Facility: CLINIC | Age: 48
End: 2018-06-22

## 2018-06-22 VITALS
HEIGHT: 62 IN | BODY MASS INDEX: 34.41 KG/M2 | DIASTOLIC BLOOD PRESSURE: 84 MMHG | WEIGHT: 187 LBS | TEMPERATURE: 98 F | HEART RATE: 77 BPM | SYSTOLIC BLOOD PRESSURE: 133 MMHG

## 2018-06-22 DIAGNOSIS — M17.12 PRIMARY OSTEOARTHRITIS OF LEFT KNEE: ICD-10-CM

## 2018-06-22 DIAGNOSIS — G47.33 MILD OBSTRUCTIVE SLEEP APNEA: Primary | ICD-10-CM

## 2018-06-22 DIAGNOSIS — G44.209 TENSION HEADACHE: ICD-10-CM

## 2018-06-22 DIAGNOSIS — G47.8 UNREFRESHED BY SLEEP: ICD-10-CM

## 2018-06-22 DIAGNOSIS — F51.04 PSYCHOPHYSIOLOGIC INSOMNIA: ICD-10-CM

## 2018-06-22 DIAGNOSIS — F32.A DEPRESSIVE DISORDER: ICD-10-CM

## 2018-06-22 DIAGNOSIS — E66.09 NON MORBID OBESITY DUE TO EXCESS CALORIES: ICD-10-CM

## 2018-06-22 PROCEDURE — 99214 OFFICE O/P EST MOD 30 MIN: CPT | Performed by: FAMILY MEDICINE

## 2018-06-22 PROCEDURE — 99212 OFFICE O/P EST SF 10 MIN: CPT | Performed by: FAMILY MEDICINE

## 2018-06-22 NOTE — PROGRESS NOTES
Patient ID: Wili Atkins is a 50year old female. HPI  Patient presents with:  Knee Pain  Sleep Problem: results  Medication Follow-Up: pt states not currently taking meds prescribed      Sleep study showed mild sleep apnea.   She stat natalia  05/01/2017: OTHER SURGICAL HISTORY      Comment: right breast biopsy        Current Outpatient Prescriptions:  escitalopram 10 MG Oral Tab Take 1 tablet (10 mg total) by mouth daily.  For mood Disp: 90 tablet Rfl: 0     Allergies:  Penicillins know if things worsen. Non morbid obesity due to excess calories  Patient Instructions                                           HEALTH TIPS     Exercise, work on your diet, watch your portion sizes. Avoid eating late at night.   Make sure to EAT [facility]

## 2018-06-30 ENCOUNTER — NURSE TRIAGE (OUTPATIENT)
Dept: FAMILY MEDICINE CLINIC | Facility: CLINIC | Age: 48
End: 2018-06-30

## 2018-06-30 NOTE — TELEPHONE ENCOUNTER
Action Requested: Summary for Provider     []  Critical Lab, Recommendations Needed  [] Need Additional Advice  []   FYI    []   Need Orders  [] Need Medications Sent to Pharmacy  []  Other     SUMMARY: HOME CARE, foot injury    Pt states last night she tr

## 2018-07-02 ENCOUNTER — TELEPHONE (OUTPATIENT)
Dept: FAMILY MEDICINE CLINIC | Facility: CLINIC | Age: 48
End: 2018-07-02

## 2018-07-02 DIAGNOSIS — G47.33 OBSTRUCTIVE SLEEP APNEA SYNDROME: Primary | ICD-10-CM

## 2018-07-10 ENCOUNTER — HOSPITAL ENCOUNTER (OUTPATIENT)
Age: 48
Discharge: HOME | End: 2018-07-10
Attending: EMERGENCY MEDICINE
Payer: MEDICAID

## 2018-07-10 VITALS
HEIGHT: 62 IN | RESPIRATION RATE: 18 BRPM | DIASTOLIC BLOOD PRESSURE: 91 MMHG | BODY MASS INDEX: 34.41 KG/M2 | SYSTOLIC BLOOD PRESSURE: 150 MMHG | TEMPERATURE: 99 F | HEART RATE: 100 BPM | OXYGEN SATURATION: 99 % | WEIGHT: 187 LBS

## 2018-07-10 DIAGNOSIS — H65.92 LEFT OTITIS MEDIA WITH EFFUSION: Primary | ICD-10-CM

## 2018-07-10 PROCEDURE — 99214 OFFICE O/P EST MOD 30 MIN: CPT

## 2018-07-10 PROCEDURE — 99213 OFFICE O/P EST LOW 20 MIN: CPT

## 2018-07-10 RX ORDER — AZITHROMYCIN 250 MG/1
TABLET, FILM COATED ORAL
Qty: 1 PACKAGE | Refills: 0 | Status: SHIPPED | OUTPATIENT
Start: 2018-07-10 | End: 2018-08-10 | Stop reason: ALTCHOICE

## 2018-07-10 NOTE — ED PROVIDER NOTES
Patient Seen in: [facility] In [address] [place] [address]    History   Patient presents with:  Cough/URI  Ear Problem Pain (neurosensory)    Stated Complaint: ear pain    HPI    Patient is a 60-year-old female with no significant past medical history flat.  The left TM is moderately erythematous and bulging.   There is no posterior pharyngeal erythema  Chest: Clear to auscultation, no tenderness  Cardiovascular: Regular rate and rhythm without murmur  Abdomen: Soft, nontender and nondistended  Neurologi

## 2018-07-10 NOTE — ED NOTES
Motrin or tylenol for fever aches and pains fill and take po meds, call and follow up with pcp in office 3 days as needed,.

## 2018-08-10 ENCOUNTER — OFFICE VISIT (OUTPATIENT)
Dept: FAMILY MEDICINE CLINIC | Facility: CLINIC | Age: 48
End: 2018-08-10
Payer: MEDICAID

## 2018-08-10 VITALS
BODY MASS INDEX: 35.76 KG/M2 | SYSTOLIC BLOOD PRESSURE: 126 MMHG | TEMPERATURE: 98 F | HEIGHT: 60.75 IN | DIASTOLIC BLOOD PRESSURE: 89 MMHG | HEART RATE: 79 BPM | WEIGHT: 187 LBS

## 2018-08-10 DIAGNOSIS — G47.33 OSA (OBSTRUCTIVE SLEEP APNEA): Primary | ICD-10-CM

## 2018-08-10 DIAGNOSIS — Z12.39 BREAST CANCER SCREENING: ICD-10-CM

## 2018-08-10 PROCEDURE — 99212 OFFICE O/P EST SF 10 MIN: CPT | Performed by: FAMILY MEDICINE

## 2018-08-10 PROCEDURE — 99214 OFFICE O/P EST MOD 30 MIN: CPT | Performed by: FAMILY MEDICINE

## 2018-08-10 NOTE — PROGRESS NOTES
Patient ID: Asha Whitney is a 50year old female. HPI  Patient presents with:  Obstructive Sleep Apnea (ADOLFO): Pt is here for cpap machine issue    Her insurance refuses to cover a CPAP titration. They will allow her to have an APAP. Rfl: 0     Allergies:  Penicillins                PHYSICAL EXAM:   Physical Exam  Blood pressure 126/89, pulse 79, temperature 98.2 °F (36.8 °C), temperature source Tympanic, height 5' 0.75\" (1.543 m), weight 187 lb (84.8 kg), not currently breastfeeding.

## 2018-08-21 ENCOUNTER — TELEPHONE (OUTPATIENT)
Dept: FAMILY MEDICINE CLINIC | Facility: CLINIC | Age: 48
End: 2018-08-21

## 2018-08-21 DIAGNOSIS — G47.30 SLEEP APNEA, UNSPECIFIED TYPE: Primary | ICD-10-CM

## 2018-08-21 NOTE — TELEPHONE ENCOUNTER
Nils/NUNU called in stating he received the order for Pt's Auto Pap. Carmen Harman is requesting Pt's demographics, insurance information, progress notes and the pressure settings for the Auto Pap. Please fax to #[phone]    Please advise.

## 2018-08-23 NOTE — TELEPHONE ENCOUNTER
The lowest setting on CPAP machines may be  4  cm of H20 or CWP and highest at 20  cm of H20 or CWP.

## 2018-09-28 ENCOUNTER — LAB ENCOUNTER (OUTPATIENT)
Dept: LAB | Age: 48
End: 2018-09-28
Attending: FAMILY MEDICINE
Payer: MEDICAID

## 2018-09-28 ENCOUNTER — OFFICE VISIT (OUTPATIENT)
Dept: FAMILY MEDICINE CLINIC | Facility: CLINIC | Age: 48
End: 2018-09-28
Payer: MEDICAID

## 2018-09-28 ENCOUNTER — TELEPHONE (OUTPATIENT)
Dept: FAMILY MEDICINE CLINIC | Facility: CLINIC | Age: 48
End: 2018-09-28

## 2018-09-28 ENCOUNTER — HOSPITAL ENCOUNTER (OUTPATIENT)
Dept: GENERAL RADIOLOGY | Age: 48
Discharge: HOME | End: 2018-09-28
Attending: FAMILY MEDICINE
Payer: MEDICAID

## 2018-09-28 VITALS
HEART RATE: 68 BPM | WEIGHT: 189.38 LBS | DIASTOLIC BLOOD PRESSURE: 74 MMHG | RESPIRATION RATE: 12 BRPM | HEIGHT: 60.5 IN | TEMPERATURE: 98 F | SYSTOLIC BLOOD PRESSURE: 126 MMHG | BODY MASS INDEX: 36.22 KG/M2

## 2018-09-28 DIAGNOSIS — E66.01 SEVERE OBESITY (BMI 35.0-35.9 WITH COMORBIDITY) (HCC): ICD-10-CM

## 2018-09-28 DIAGNOSIS — S86.811A STRAIN OF CALF MUSCLE, RIGHT, INITIAL ENCOUNTER: Primary | ICD-10-CM

## 2018-09-28 DIAGNOSIS — M22.2X1 PATELLOFEMORAL PAIN SYNDROME OF RIGHT KNEE: ICD-10-CM

## 2018-09-28 DIAGNOSIS — G47.33 OSA (OBSTRUCTIVE SLEEP APNEA): ICD-10-CM

## 2018-09-28 DIAGNOSIS — G47.30 SLEEP APNEA, UNSPECIFIED TYPE: ICD-10-CM

## 2018-09-28 DIAGNOSIS — S86.811A STRAIN OF CALF MUSCLE, RIGHT, INITIAL ENCOUNTER: ICD-10-CM

## 2018-09-28 DIAGNOSIS — E66.01 SEVERE OBESITY (HCC): Primary | ICD-10-CM

## 2018-09-28 PROCEDURE — 73562 X-RAY EXAM OF KNEE 3: CPT | Performed by: FAMILY MEDICINE

## 2018-09-28 PROCEDURE — 99214 OFFICE O/P EST MOD 30 MIN: CPT | Performed by: FAMILY MEDICINE

## 2018-09-28 PROCEDURE — 99212 OFFICE O/P EST SF 10 MIN: CPT | Performed by: FAMILY MEDICINE

## 2018-09-28 PROCEDURE — 93005 ELECTROCARDIOGRAM TRACING: CPT

## 2018-09-28 PROCEDURE — 93010 ELECTROCARDIOGRAM REPORT: CPT | Performed by: FAMILY MEDICINE

## 2018-09-28 RX ORDER — PHENTERMINE HYDROCHLORIDE 15 MG/1
15 CAPSULE ORAL EVERY MORNING
Qty: 30 CAPSULE | Refills: 1 | OUTPATIENT
Start: 2018-09-28 | End: 2018-11-30

## 2018-09-28 NOTE — PROGRESS NOTES
Patient ID: Monique Lafleur is a 50year old female.     HPI  Patient presents with:  Pain: right knee, pain with going up stairs     2 days ago she was going down the stairs and felt a tearing sensation in the popliteal fossa of the right k kg)      BMI Readings from Last 6 Encounters:  09/28/18 : 36.38 kg/m²  08/10/18 : 35.62 kg/m²  07/10/18 : 34.20 kg/m²  06/22/18 : 34.20 kg/m²  05/24/18 : 35.92 kg/m²  04/30/18 : 36.50 kg/m²      BP Readings from Last 6 Encounters:  09/28/18 : 126/74  08/10 Stability Testing       Anterior Drawer Negative      Posterior Drawer Negative      Lachman Negative      Pivot Shift Negative              Varus Stress       0 Degrees Negative      30 Degrees Negative       Valgus Stress        0 Degrees Negative in this encounter. Follow up if symptoms persist.  Take medicine (if given) as prescribed. Approach to treatment discussed and patient/family member understands and agrees to plan.          Xochitl Carreno,   9/28/2018

## 2018-09-29 NOTE — TELEPHONE ENCOUNTER
Result Notes for XR KNEE ROUTINE (3 VIEWS), RIGHT (BPD=[id number])     Notes recorded by Perri Damian DO on 9/28/2018 at 6:34 PM CDT  Right knee shows some minimal arthritic changes but nothing severe that would need surgery.  She needs to work on her Qumas

## 2018-09-29 NOTE — PROGRESS NOTES
Spoke with patient (identified name and ), results reviewed and agrees with plan.   Patient will start phentermine and make f/u appt in 2 months for weight check and BP check

## 2018-09-29 NOTE — TELEPHONE ENCOUNTER
Your EKG was normal.  I will have my nurse call in phentermine 15 mg daily with 1 refill. Take this in the morning and hopefully it can help you lose some weight. I want to see you back then in 6 weeks and my nurse will make an appointment.

## 2018-09-29 NOTE — TELEPHONE ENCOUNTER
Spoke with patient (identified name and ), results reviewed and agrees with plan.   Patient will start PT exercises for R knee, start phentermine and make f/u appt in 2 months for weight check and BP check

## 2018-10-24 ENCOUNTER — HOSPITAL ENCOUNTER (OUTPATIENT)
Dept: MAMMOGRAPHY | Facility: HOSPITAL | Age: 48
Discharge: HOME | End: 2018-10-24
Attending: FAMILY MEDICINE
Payer: MEDICAID

## 2018-10-24 DIAGNOSIS — Z12.39 BREAST CANCER SCREENING: ICD-10-CM

## 2018-10-24 PROCEDURE — 77063 BREAST TOMOSYNTHESIS BI: CPT | Performed by: FAMILY MEDICINE

## 2018-10-24 PROCEDURE — 77067 SCR MAMMO BI INCL CAD: CPT | Performed by: FAMILY MEDICINE

## 2018-10-27 ENCOUNTER — TELEPHONE (OUTPATIENT)
Dept: OTHER | Age: 48
End: 2018-10-27

## 2018-10-27 NOTE — TELEPHONE ENCOUNTER
Pt called reports she has been waiting for months for her cpap filters. States they send order to MD but never got a response.

## 2018-10-29 NOTE — TELEPHONE ENCOUNTER
LMTCB  Patient had previously reported she was told when she needs supplies her provider has to do an order, she believes this to be the case every month in order to obtain her needed refills.      [facility] Expressed, spoke with Olu Ramey,  Morena c

## 2018-10-30 NOTE — TELEPHONE ENCOUNTER
Read patient the note below about Home Medical, gave patient the phone # below, advised her to call now to order her supplies; patient stated that she will call about her supplies. Advised patient to call back if any issues.

## 2018-11-05 ENCOUNTER — HOSPITAL ENCOUNTER (OUTPATIENT)
Dept: MAMMOGRAPHY | Facility: HOSPITAL | Age: 48
Discharge: HOME | End: 2018-11-05
Attending: FAMILY MEDICINE
Payer: MEDICAID

## 2018-11-05 ENCOUNTER — HOSPITAL ENCOUNTER (OUTPATIENT)
Dept: ULTRASOUND IMAGING | Facility: HOSPITAL | Age: 48
Discharge: HOME | End: 2018-11-05
Attending: FAMILY MEDICINE
Payer: MEDICAID

## 2018-11-05 DIAGNOSIS — R92.8 ABNORMAL MAMMOGRAM: ICD-10-CM

## 2018-11-05 PROCEDURE — 77065 DX MAMMO INCL CAD UNI: CPT | Performed by: FAMILY MEDICINE

## 2018-11-05 PROCEDURE — 76642 ULTRASOUND BREAST LIMITED: CPT | Performed by: FAMILY MEDICINE

## 2018-11-05 PROCEDURE — 77061 BREAST TOMOSYNTHESIS UNI: CPT | Performed by: FAMILY MEDICINE

## 2018-11-12 DIAGNOSIS — N63.20 MASS OF LEFT BREAST: Primary | ICD-10-CM

## 2018-11-13 ENCOUNTER — TELEPHONE (OUTPATIENT)
Dept: OTHER | Age: 48
End: 2018-11-13

## 2018-11-13 DIAGNOSIS — N63.20 LEFT BREAST MASS: Primary | ICD-10-CM

## 2018-11-13 NOTE — TELEPHONE ENCOUNTER
Pt contacted and informed of results as stated below by VS. Pt states last year she was told she had a benign mass on right breast and she preferred to proceed with biopsy to confirm benign finding.  Pt states she would like to do the same for left breast r

## 2018-11-13 NOTE — TELEPHONE ENCOUNTER
----- Message from Xavier Lauren DO sent at 11/12/2018 11:38 PM CST -----  The mammogram with ultrasound showed a 1.1 cm mass in the left breast but they think this is most likely benign and not cancerous but what they recommend is to do a ultrasound in [place]

## 2018-11-14 NOTE — TELEPHONE ENCOUNTER
I will have her see Dr. Cynthia Friedman who specializes in breast abnormalities and let him decide what would be the best way to proceed such as biopsy or removal of the mass.

## 2018-11-30 ENCOUNTER — OFFICE VISIT (OUTPATIENT)
Dept: FAMILY MEDICINE CLINIC | Facility: CLINIC | Age: 48
End: 2018-11-30
Payer: MEDICAID

## 2018-11-30 VITALS
HEART RATE: 94 BPM | RESPIRATION RATE: 16 BRPM | TEMPERATURE: 98 F | SYSTOLIC BLOOD PRESSURE: 125 MMHG | HEIGHT: 60.5 IN | DIASTOLIC BLOOD PRESSURE: 87 MMHG | WEIGHT: 172.38 LBS | BODY MASS INDEX: 32.97 KG/M2

## 2018-11-30 DIAGNOSIS — E66.09 NON MORBID OBESITY DUE TO EXCESS CALORIES: Primary | ICD-10-CM

## 2018-11-30 DIAGNOSIS — B35.1 ONYCHOMYCOSIS OF TOENAIL: ICD-10-CM

## 2018-11-30 PROCEDURE — 99212 OFFICE O/P EST SF 10 MIN: CPT | Performed by: FAMILY MEDICINE

## 2018-11-30 PROCEDURE — 99214 OFFICE O/P EST MOD 30 MIN: CPT | Performed by: FAMILY MEDICINE

## 2018-11-30 RX ORDER — PHENTERMINE HYDROCHLORIDE 30 MG/1
30 CAPSULE ORAL EVERY MORNING
Qty: 30 CAPSULE | Refills: 2 | Status: SHIPPED | OUTPATIENT
Start: 2018-11-30 | End: 2019-02-04

## 2018-11-30 NOTE — PROGRESS NOTES
Patient ID: Ene Bennett is a 50year old female. HPI  Patient presents with:  Weight Check    She states she was very happy with the weight loss. She states it was helping wonderfully and really helped her decrease carbohydrates.   A Allergies:  Penicillins                PHYSICAL EXAM:   Physical Exam  Blood pressure 133/89, pulse 94, temperature 97.7 °F (36.5 °C), temperature source Oral, resp.  rate 16, height 5' 0.5\" (1.537 m), weight 172 lb 6.4 oz (78.2 kg), not currently josephine culture      Referrals (if applicable)  Orders Placed This Encounter      Podiatry- Dr Beata Marcus Comments:              (FOOT DOCTOR). HE IS ALSO AT THE [facility].           Referral Priority:Routine          Referral Type:OFFICE VISIT

## 2018-12-03 ENCOUNTER — OFFICE VISIT (OUTPATIENT)
Dept: PODIATRY CLINIC | Facility: CLINIC | Age: 48
End: 2018-12-03
Payer: MEDICAID

## 2018-12-03 DIAGNOSIS — L03.031 ONYCHIA OF TOE OF RIGHT FOOT: Primary | ICD-10-CM

## 2018-12-03 PROCEDURE — 99212 OFFICE O/P EST SF 10 MIN: CPT | Performed by: PODIATRIST

## 2018-12-03 NOTE — PROGRESS NOTES
HPI:    Patient ID: Marcos Wakefield is a 50year old female. This 41-year-old female presents to the office having not been seen in approximately 1 year. She remains concerned associated with the right great toe.   I am I was expecting to s

## 2018-12-15 ENCOUNTER — OFFICE VISIT (OUTPATIENT)
Dept: INTERNAL MEDICINE CLINIC | Facility: CLINIC | Age: 48
End: 2018-12-15
Payer: MEDICAID

## 2018-12-15 VITALS
WEIGHT: 171 LBS | SYSTOLIC BLOOD PRESSURE: 117 MMHG | HEIGHT: 60 IN | DIASTOLIC BLOOD PRESSURE: 82 MMHG | TEMPERATURE: 98 F | BODY MASS INDEX: 33.57 KG/M2 | HEART RATE: 88 BPM

## 2018-12-15 DIAGNOSIS — K59.03 DRUG-INDUCED CONSTIPATION: ICD-10-CM

## 2018-12-15 DIAGNOSIS — D17.23 LIPOMA OF RIGHT LOWER EXTREMITY: ICD-10-CM

## 2018-12-15 DIAGNOSIS — M54.50 ACUTE BILATERAL LOW BACK PAIN WITHOUT SCIATICA: Primary | ICD-10-CM

## 2018-12-15 DIAGNOSIS — M62.830 MUSCLE SPASM OF BACK: ICD-10-CM

## 2018-12-15 PROCEDURE — 99213 OFFICE O/P EST LOW 20 MIN: CPT | Performed by: INTERNAL MEDICINE

## 2018-12-15 PROCEDURE — 99212 OFFICE O/P EST SF 10 MIN: CPT | Performed by: INTERNAL MEDICINE

## 2018-12-15 RX ORDER — METHYLPREDNISOLONE 4 MG/1
TABLET ORAL
Qty: 1 KIT | Refills: 0 | Status: SHIPPED | OUTPATIENT
Start: 2018-12-15 | End: 2019-01-14

## 2018-12-15 RX ORDER — CYCLOBENZAPRINE HCL 5 MG
5 TABLET ORAL NIGHTLY PRN
Qty: 8 TABLET | Refills: 0 | Status: SHIPPED | OUTPATIENT
Start: 2018-12-15 | End: 2019-01-14

## 2018-12-15 NOTE — PATIENT INSTRUCTIONS
Back Care Tips    Caring for your back  These are things you can do to prevent a recurrence of acute back pain and to reduce symptoms from chronic back pain:  · Maintain a healthy weight.  If you are overweight, losing weight will help most types of back · Be careful if you are given prescription pain medicines, narcotics, or medicine for muscle spasm. They can cause drowsiness, affect your coordination, reflexes, and judgment. Do not drive or operate heavy machinery while taking these types of medicines. The best way to sleep is on your side with your knees bent. Put a low pillow under your head to support your neck in a neutral spine position. Avoid thick pillows that bend your neck to one side.  Put a pillow between your legs to further relax your lower b · It can be localized to one spot or area, or it can be more generalized. · It can spread or radiate upwards, to the front, or go down your arms or legs (sciatica). · It can cause muscle spasm.   Most of the time, mechanical problems with the muscles or s · At first, do not try to stretch out the sore spots. If there is a strain, it is not like the good soreness you get after exercising without an injury. In this case, stretching may make it worse.   · Don't sit for long periods, as in a long car ride or dur · Be careful if you are given a prescription medicines, narcotics, or medicine for muscle spasms. They can cause drowsiness, affect your coordination, reflexes, and judgement. Do not drive or operate heavy machinery.   Follow-up care  Follow up with your he · For the first few days, apply an ice pack for 15 to 20 minutes, several times a day. To make a cold pack, put ice cubes in a plastic bag that seals at the top. · After the first few days, try heat for 15 minutes at a time to ease pain.  Never sleep on a A healthy spine supports the body while letting it move freely. It does this with the help of three natural curves. Strong, flexible muscles help, too. They support the spine by keeping its curves properly aligned.  The disks that cushion the bones of your General information regarding lipoma includes:  1. No special care is needed for a lipoma. 2. You can consider removal for cosmetic reasons. 3. Sometimes lipomas are uncomfortable because they put pressure on surrounding tissues.  This is also a reason to

## 2018-12-15 NOTE — PROGRESS NOTES
HPI:    Patient ID: Mireya Garcia is a 50year old female.   Low Back Pain (Pain mid lower back, pt states pain started yesterday, radiates into into right side buttock to hamstring, feels a pinche in hamstring)      HPI   S/p Hysterectomy Current Outpatient Medications:       Current Outpatient Medications:  methylPREDNISolone (MEDROL) 4 MG Oral Tablet Therapy Pack As directed.  Disp: 1 kit Rfl: 0   Cyclobenzaprine HCl 5 MG Oral Tab Take 1 tablet (5 mg total) by mouth nightly as needed for M Pulmonary/Chest: Effort normal and breath sounds normal. No respiratory distress. She has no wheezes. Abdominal: Soft. Bowel sounds are normal. She exhibits no distension. There is no tenderness. Musculoskeletal:   No C-T-L vertebral body tenderness.  [place] - miralax for constipation        Follow Up:   Return if symptoms worsen or fail to improve. After visit summary and education provided to patient. All questions answered. Patient understands and agrees to follow directions and advice.  Patient asked t · Therapeutic massage can help relax the back muscles without stretching them.   · During the first 24 to 72 hours after an acute injury or flare-up of chronic back pain, apply an ice pack to the painful area for 20 minutes and then remove it for 20 minutes · Hold the object close to your body, directly in front of you. · Straighten your legs to lift the object.   · Lower the object to the floor in the reverse fashion. · If you must slide something across the floor, push it.   Posture tips  Sitting  Sit in c © 4803-6781 [employer] [address]. [address] [facility], [address] [place]. All rights reserved. This information is not intended as a substitute for professional medical care. Always follow your healthcare professional's instructions.         Back Pa Acute back pain usually gets better in 1 to 2 weeks. Back pain related to disk disease, arthritis in the spinal joints or spinal stenosis (narrowing of the spinal canal) can become chronic and last for months or years.   Unless you had a physical injury (fo · Therapeutic massage can help relax the back muscles without stretching them.   · Be aware of safe lifting methods and do not lift anything without stretching first.  Medicines  Talk to your doctor before using medicine, especially if you have other medica Most people have low back pain now and then. In many cases, it isn’t serious and self-care can help. Sometimes low back pain can be a sign of a bigger problem. Call your healthcare provider if your pain returns often or gets worse over time.  For the long-t · Your pain is constant. · You have pain, tingling, or numbness in your leg. · You feel pain in a new area of your back. · You notice that the pain isn’t decreasing after more than a week.    Date Last Reviewed: 3/1/2018  © 7374-0063 [employer] Disks are the soft pads of tissue between the vertebrae. The disks absorb shock caused by movement. Each disk has a spongy center (nucleus) and a tougher outer ring (annulus).  Movement within the nucleus allows the vertebrae to rock back and forth on the d

## 2019-01-14 ENCOUNTER — OFFICE VISIT (OUTPATIENT)
Dept: FAMILY MEDICINE CLINIC | Facility: CLINIC | Age: 49
End: 2019-01-14
Payer: MEDICAID

## 2019-01-14 VITALS
SYSTOLIC BLOOD PRESSURE: 135 MMHG | BODY MASS INDEX: 32 KG/M2 | TEMPERATURE: 98 F | DIASTOLIC BLOOD PRESSURE: 85 MMHG | WEIGHT: 166 LBS | HEART RATE: 77 BPM

## 2019-01-14 DIAGNOSIS — L30.9 DERMATITIS: ICD-10-CM

## 2019-01-14 DIAGNOSIS — B86 SCABIES: Primary | ICD-10-CM

## 2019-01-14 DIAGNOSIS — B00.1 HERPES LABIALIS: ICD-10-CM

## 2019-01-14 DIAGNOSIS — J30.89 OTHER ALLERGIC RHINITIS: ICD-10-CM

## 2019-01-14 DIAGNOSIS — G47.33 OSA (OBSTRUCTIVE SLEEP APNEA): ICD-10-CM

## 2019-01-14 DIAGNOSIS — L29.9 GENERALIZED PRURITUS: ICD-10-CM

## 2019-01-14 PROCEDURE — 99214 OFFICE O/P EST MOD 30 MIN: CPT | Performed by: FAMILY MEDICINE

## 2019-01-14 PROCEDURE — 99212 OFFICE O/P EST SF 10 MIN: CPT | Performed by: FAMILY MEDICINE

## 2019-01-14 RX ORDER — PERMETHRIN 50 MG/G
CREAM TOPICAL
Qty: 60 G | Refills: 1 | Status: SHIPPED | OUTPATIENT
Start: 2019-01-14 | End: 2019-01-19 | Stop reason: ALTCHOICE

## 2019-01-14 RX ORDER — HYDROXYZINE HYDROCHLORIDE 10 MG/1
10 TABLET, FILM COATED ORAL NIGHTLY PRN
Qty: 10 TABLET | Refills: 0 | Status: SHIPPED | OUTPATIENT
Start: 2019-01-14 | End: 2019-01-24

## 2019-01-14 NOTE — PROGRESS NOTES
Patient ID: Gilbert Arambula is a 50year old female.     HPI  Patient presents with:  Rash: on trunk and upper and lower extrematies ,very itchy ,started on back of neck x one week has been using itch cream OTC ,and caladryl ,not helping   S from Last 6 Encounters:  01/14/19 : 135/85  12/15/18 : 117/82  11/30/18 : 125/87  09/28/18 : 126/74  08/10/18 : 126/89  07/10/18 : (!) 150/91        Review of Systems      Past Medical History:   Diagnosis Date   • Obesity        Past Surgical History:   P and leaved on for 8-12 hrs and then wash off next morning. I gave her an education sheet on scabies. Start Elimite as directed.   At the gym you may want to at least wash off with a wet rag but best to try to take a shower instead of waiting till later at

## 2019-01-16 ENCOUNTER — TELEPHONE (OUTPATIENT)
Dept: OTHER | Age: 49
End: 2019-01-16

## 2019-01-16 NOTE — TELEPHONE ENCOUNTER
Recommend pt take otc xyzal nightly and can apply otc cortisone. Does not need treatment again. Takes time to work. If does not work, call back or make follow up appt.

## 2019-01-16 NOTE — TELEPHONE ENCOUNTER
Dr. Varun Rodríguez for Dr. Reina Brown. Pt stated that she was in to see  and was inform she has scabies.   prescribed her permethrin cream which she understood she is supposed to apply it from head to toe and sleep with the cream for 8-12 hrs and wash in the mornin

## 2019-01-19 ENCOUNTER — OFFICE VISIT (OUTPATIENT)
Dept: FAMILY MEDICINE CLINIC | Facility: CLINIC | Age: 49
End: 2019-01-19
Payer: MEDICAID

## 2019-01-19 VITALS
HEART RATE: 74 BPM | TEMPERATURE: 98 F | WEIGHT: 165 LBS | SYSTOLIC BLOOD PRESSURE: 120 MMHG | HEIGHT: 60 IN | DIASTOLIC BLOOD PRESSURE: 81 MMHG | BODY MASS INDEX: 32.39 KG/M2

## 2019-01-19 DIAGNOSIS — L23.9 ALLERGIC DERMATITIS: Primary | ICD-10-CM

## 2019-01-19 PROCEDURE — 99213 OFFICE O/P EST LOW 20 MIN: CPT | Performed by: PHYSICIAN ASSISTANT

## 2019-01-19 PROCEDURE — 99212 OFFICE O/P EST SF 10 MIN: CPT | Performed by: PHYSICIAN ASSISTANT

## 2019-01-19 RX ORDER — METHYLPREDNISOLONE 4 MG/1
TABLET ORAL
Qty: 1 KIT | Refills: 0 | Status: SHIPPED | OUTPATIENT
Start: 2019-01-19 | End: 2019-02-04

## 2019-01-19 NOTE — TELEPHONE ENCOUNTER
Patient following up with Dr Gabriela Carr regarding condition of scabies, reports using otc treatments as recommended, had some improvement but feels symptoms worsening again, unable to sleep due to itching and started having rash again.  Is able to fill permethrin c

## 2019-01-19 NOTE — TELEPHONE ENCOUNTER
Patient advised of recommendations per GANGA KAPLAN, verbalized understanding.  Patient reports had made an appt to be seen today with RAHUL GARDINER, patient had concerns of delay in response from PCP and wanted to make sure she got treatment today, she is currently

## 2019-01-19 NOTE — PROGRESS NOTES
HPI:     HPI  50year-old female is here in the office complaining of rash on her chest, abdomen and upper extremities for 4 days. Patient states that she used Elimite cream and Hydroxyzine with no relief.  The rash is characterized by redness and itchiness Socioeconomic History      Marital status:       Spouse name: Not on file      Number of children: Not on file      Years of education: Not on file      Highest education level: Not on file    Social Needs      Financial resource strain: Not on file rate, regular rhythm, S1 normal, S2 normal and normal heart sounds. No murmur heard. Pulmonary/Chest: Effort normal and breath sounds normal. She has no wheezes. She has no rales. She exhibits no tenderness.    Neurological: She is alert and oriented to

## 2019-01-19 NOTE — TELEPHONE ENCOUNTER
It is recommended that 2 treatments be done, one week apart. The itching can last for 2 weeks after initial treatment. Pt should be taking hydroxyzine at night. Can take oral zyrtec for itching as well. Please call if symptoms worsen or are not resolving.

## 2019-02-04 ENCOUNTER — OFFICE VISIT (OUTPATIENT)
Dept: FAMILY MEDICINE CLINIC | Facility: CLINIC | Age: 49
End: 2019-02-04
Payer: MEDICAID

## 2019-02-04 VITALS
HEART RATE: 106 BPM | WEIGHT: 163 LBS | TEMPERATURE: 97 F | BODY MASS INDEX: 32 KG/M2 | DIASTOLIC BLOOD PRESSURE: 89 MMHG | HEIGHT: 60 IN | SYSTOLIC BLOOD PRESSURE: 128 MMHG

## 2019-02-04 DIAGNOSIS — K59.00 CONSTIPATION, UNSPECIFIED CONSTIPATION TYPE: ICD-10-CM

## 2019-02-04 DIAGNOSIS — E66.09 NON MORBID OBESITY DUE TO EXCESS CALORIES: Primary | ICD-10-CM

## 2019-02-04 DIAGNOSIS — L23.9 ALLERGIC DERMATITIS: ICD-10-CM

## 2019-02-04 PROCEDURE — 99212 OFFICE O/P EST SF 10 MIN: CPT | Performed by: FAMILY MEDICINE

## 2019-02-04 PROCEDURE — 99214 OFFICE O/P EST MOD 30 MIN: CPT | Performed by: FAMILY MEDICINE

## 2019-02-04 RX ORDER — PHENTERMINE HYDROCHLORIDE 30 MG/1
30 CAPSULE ORAL EVERY MORNING
Qty: 30 CAPSULE | Refills: 2 | Status: SHIPPED | OUTPATIENT
Start: 2019-02-04 | End: 2019-04-04

## 2019-02-04 NOTE — PROGRESS NOTES
Patient ID: Chaim Bonilla is a 50year old female. HPI  Patient presents with:  Weight Check  Medication Follow-Up     She is on the phentermine 30 mg.   She states it definitely helps but she wonders if it is not causing her some const 08/19/2017     08/19/2017    K 4.9 08/19/2017     08/19/2017    CO2 27 08/19/2017       Lab Results   Component Value Date     (H) 08/19/2017    BUN 13 08/19/2017    CREATSERUM 0.60 08/19/2017    BUNCREA 21.7 (H) 08/19/2017    ANIONGAP 7 126/74        Review of Systems      Past Medical History:   Diagnosis Date   • Obesity        Past Surgical History:   Procedure Laterality Date   • HYSTERECTOMY  2009    LORNA for heavy menses, anemia   • LIPOMA REMOVAL Left 2005    Lipoma removal of left her to continue this daily. Continue drinking water daily. Allergic dermatitis  No rash at this time and she will keep me abreast of what happens with the rash. Referrals (if applicable)  No orders of the defined types were placed in this encounter.

## 2019-03-04 ENCOUNTER — TELEPHONE (OUTPATIENT)
Dept: ORTHOPEDICS CLINIC | Facility: CLINIC | Age: 49
End: 2019-03-04

## 2019-03-04 ENCOUNTER — OFFICE VISIT (OUTPATIENT)
Dept: PODIATRY CLINIC | Facility: CLINIC | Age: 49
End: 2019-03-04
Payer: MEDICAID

## 2019-03-04 ENCOUNTER — TELEPHONE (OUTPATIENT)
Dept: PODIATRY CLINIC | Facility: CLINIC | Age: 49
End: 2019-03-04

## 2019-03-04 DIAGNOSIS — L03.031 ONYCHIA OF TOE OF RIGHT FOOT: Primary | ICD-10-CM

## 2019-03-04 DIAGNOSIS — B35.1 ONYCHOMYCOSIS: ICD-10-CM

## 2019-03-04 PROCEDURE — 99212 OFFICE O/P EST SF 10 MIN: CPT | Performed by: PODIATRIST

## 2019-03-04 NOTE — TELEPHONE ENCOUNTER
Pt states that the Insur will not cover the Ciclopirox 8% med., so pt. wants to know if Dr can send a Rx for another med., that Medicaid will cover?

## 2019-03-04 NOTE — PROGRESS NOTES
HPI:    Patient ID: Catie Delaney is a 52year old female. This 14-year-old female presents with further considerations and frustrations associated primarily with the right great toenail.   She is been using an over-the-counter antifunga

## 2019-03-04 NOTE — TELEPHONE ENCOUNTER
PA NEEDED. Plan does not cover this medication. Please call plan at [phone] to initiate PA or call/fax pharmacy to change medication. Patient ID# [id number]. Or change to something else.       Current Outpatient Medications:  Ciclopirox 8 % External

## 2019-03-05 NOTE — TELEPHONE ENCOUNTER
She will have to stop all care for 1 month so I can culture and confirm diagnosis for treatment to be authorized. Kayce.  scr

## 2019-03-05 NOTE — TELEPHONE ENCOUNTER
Shall I proceed with prior authorization or would you prefer to change the medication  Please advise

## 2019-03-05 NOTE — TELEPHONE ENCOUNTER
Call to [facility]. Informed of stopping all treatment to the toe nails for 1 month. Pt instructed to make an appointment at that time for a nail culture.  Pt verbalizes understanding

## 2019-04-01 ENCOUNTER — OFFICE VISIT (OUTPATIENT)
Dept: PODIATRY CLINIC | Facility: CLINIC | Age: 49
End: 2019-04-01
Payer: MEDICAID

## 2019-04-01 DIAGNOSIS — B35.1 ONYCHOMYCOSIS: Primary | ICD-10-CM

## 2019-04-01 PROCEDURE — 99212 OFFICE O/P EST SF 10 MIN: CPT | Performed by: PODIATRIST

## 2019-04-01 NOTE — PROGRESS NOTES
HPI:    Patient ID: Marbella Lizama is a 52year old female. 72-year-old female presents for the purpose of culture of her toenails. She discontinued all antifungal treatment over the course of the last 4-5 weeks.   She does not have a sens

## 2019-04-04 ENCOUNTER — OFFICE VISIT (OUTPATIENT)
Dept: FAMILY MEDICINE CLINIC | Facility: CLINIC | Age: 49
End: 2019-04-04
Payer: MEDICAID

## 2019-04-04 VITALS
SYSTOLIC BLOOD PRESSURE: 129 MMHG | HEART RATE: 101 BPM | WEIGHT: 156 LBS | HEIGHT: 60 IN | DIASTOLIC BLOOD PRESSURE: 87 MMHG | BODY MASS INDEX: 30.63 KG/M2 | TEMPERATURE: 97 F

## 2019-04-04 DIAGNOSIS — L23.9 ALLERGIC DERMATITIS: ICD-10-CM

## 2019-04-04 DIAGNOSIS — Z00.00 ADULT GENERAL MEDICAL EXAM: Primary | ICD-10-CM

## 2019-04-04 DIAGNOSIS — E66.09 NON MORBID OBESITY DUE TO EXCESS CALORIES: ICD-10-CM

## 2019-04-04 DIAGNOSIS — G47.33 OSA (OBSTRUCTIVE SLEEP APNEA): ICD-10-CM

## 2019-04-04 DIAGNOSIS — Z12.4 CERVICAL CANCER SCREENING: ICD-10-CM

## 2019-04-04 DIAGNOSIS — F32.A DEPRESSIVE DISORDER: ICD-10-CM

## 2019-04-04 PROCEDURE — 99212 OFFICE O/P EST SF 10 MIN: CPT | Performed by: FAMILY MEDICINE

## 2019-04-04 PROCEDURE — 99396 PREV VISIT EST AGE 40-64: CPT | Performed by: FAMILY MEDICINE

## 2019-04-04 RX ORDER — PHENTERMINE HYDROCHLORIDE 30 MG/1
30 CAPSULE ORAL EVERY MORNING
Qty: 30 CAPSULE | Refills: 2 | Status: SHIPPED | OUTPATIENT
Start: 2019-04-04 | End: 2019-08-02

## 2019-04-04 RX ORDER — ESCITALOPRAM OXALATE 10 MG/1
10 TABLET ORAL DAILY
Qty: 30 TABLET | Refills: 1 | Status: SHIPPED | OUTPATIENT
Start: 2019-04-04 | End: 2019-05-16

## 2019-04-04 NOTE — PROGRESS NOTES
Patient ID: Leon Alberto is a 52year old female. HPI  Patient presents with:  Physical  She smokes 1-2 cigarettes per day, she is , she works as a housewife. She has a gynecologist, Dr. Beryl Branch who she saw  2 years ago.  She Component Value Date     (H) 08/19/2017    BUN 13 08/19/2017    BUNCREA 21.7 (H) 08/19/2017    CREATSERUM 0.60 08/19/2017    ANIONGAP 7 08/19/2017    GFRNAA >60 08/19/2017    GFRAA >60 08/19/2017    CA 9.1 08/19/2017    OSMOCALC 289 08/19/2017 kg)  12/15/18 : 171 lb (77.6 kg)  11/30/18 : 172 lb 6.4 oz (78.2 kg)  09/28/18 : 189 lb 6.4 oz (85.9 kg)              BMI Readings from Last 6 Encounters:  02/04/19 : 31.83 kg/m²  01/19/19 : 32.22 kg/m²  01/14/19 : 32.42 kg/m²  12/15/18 : 33.40 kg/m²  11/3 Alcohol use: Yes        Comment: socially, 1 drink/day      Drug use: No      Sexual activity: Not on file    Lifestyle      Physical activity:        Days per week: Not on file        Minutes per session: Not on file      Stress: Not on file    Relationsh tenderness. Lymphadenopathy:     She has no  cervical adenopathy. Neurological: She is alert and oriented to person, place, and time . She has normal reflexes. No cranial nerve deficit. Skin: Skin is warm and dry. No rash noted.    Psychiatric: She ha Krys Gonsalez  4/4/2019      By signing my name below, I, Krys Gonsalez,  attest that this documentation has been prepared under the direction and in the presence of Padmini Nevarez DO.    Electronically Signed: Krys Gonsalez, 4/4/2019, 10:32 AM.

## 2019-04-06 ENCOUNTER — LAB ENCOUNTER (OUTPATIENT)
Dept: LAB | Facility: HOSPITAL | Age: 49
End: 2019-04-06
Attending: FAMILY MEDICINE
Payer: MEDICAID

## 2019-04-06 DIAGNOSIS — Z00.00 ADULT GENERAL MEDICAL EXAM: ICD-10-CM

## 2019-04-06 PROCEDURE — 84481 FREE ASSAY (FT-3): CPT | Performed by: FAMILY MEDICINE

## 2019-04-06 PROCEDURE — 36415 COLL VENOUS BLD VENIPUNCTURE: CPT

## 2019-04-06 PROCEDURE — 84443 ASSAY THYROID STIM HORMONE: CPT

## 2019-04-06 PROCEDURE — 80053 COMPREHEN METABOLIC PANEL: CPT

## 2019-04-06 PROCEDURE — 80061 LIPID PANEL: CPT

## 2019-04-06 PROCEDURE — 85025 COMPLETE CBC W/AUTO DIFF WBC: CPT

## 2019-04-06 PROCEDURE — 84439 ASSAY OF FREE THYROXINE: CPT | Performed by: FAMILY MEDICINE

## 2019-04-11 DIAGNOSIS — R79.89 ELEVATED TSH: Primary | ICD-10-CM

## 2019-04-25 ENCOUNTER — TELEPHONE (OUTPATIENT)
Dept: OBGYN CLINIC | Facility: CLINIC | Age: 49
End: 2019-04-25

## 2019-04-26 ENCOUNTER — OFFICE VISIT (OUTPATIENT)
Dept: OBGYN CLINIC | Facility: CLINIC | Age: 49
End: 2019-04-26
Payer: MEDICAID

## 2019-04-26 VITALS
WEIGHT: 155 LBS | BODY MASS INDEX: 30.43 KG/M2 | SYSTOLIC BLOOD PRESSURE: 132 MMHG | DIASTOLIC BLOOD PRESSURE: 87 MMHG | HEIGHT: 60 IN | HEART RATE: 69 BPM

## 2019-04-26 DIAGNOSIS — Z01.419 ENCOUNTER FOR WELL WOMAN EXAM WITH ROUTINE GYNECOLOGICAL EXAM: Primary | ICD-10-CM

## 2019-04-26 DIAGNOSIS — R92.8 ABNORMAL MAMMOGRAM OF RIGHT BREAST: ICD-10-CM

## 2019-04-26 DIAGNOSIS — R32 URINARY INCONTINENCE, UNSPECIFIED TYPE: ICD-10-CM

## 2019-04-26 PROCEDURE — 99386 PREV VISIT NEW AGE 40-64: CPT | Performed by: OBSTETRICS & GYNECOLOGY

## 2019-04-26 NOTE — PROGRESS NOTES
Annual Gyn Exam    HPI  Bryant Murray is a known patient of mine and is here for an annual gynecologic evaluation. She is without significant complaints.   She previously was sent for pelvic floor PT but did not do her exercises and her symptoms have not im Negative for hearing loss. Eyes: Negative for visual disturbance. Respiratory: Negative for cough, shortness of breath and wheezing. Cardiovascular: Negative for palpitations.    Gastrointestinal: Negative for abdominal pain, blood in stool, constip Breastfeeding? No   BMI 30.27 kg/m²     Assessment and Plan  1.  Encounter for well woman exam with routine gynecological exam  A: 52 y.o. [id number] here for annual gynecologic evaluation  Exam, self breast exam demonstration done  Pap not done  Discussed eliseo

## 2019-05-02 ENCOUNTER — OFFICE VISIT (OUTPATIENT)
Dept: PODIATRY CLINIC | Facility: CLINIC | Age: 49
End: 2019-05-02
Payer: MEDICAID

## 2019-05-02 DIAGNOSIS — B35.1 ONYCHOMYCOSIS: Primary | ICD-10-CM

## 2019-05-02 PROCEDURE — 99212 OFFICE O/P EST SF 10 MIN: CPT | Performed by: PODIATRIST

## 2019-05-02 NOTE — PROGRESS NOTES
HPI:    Patient ID: Amanda Kingston is a 52year old female. This 55-year-old female presents for considerations of care in reference to her toenails. I did a culture on her toenails that demonstrated no apparent growth.   Recently she has

## 2019-05-06 ENCOUNTER — HOSPITAL ENCOUNTER (OUTPATIENT)
Dept: ULTRASOUND IMAGING | Facility: HOSPITAL | Age: 49
Discharge: HOME | End: 2019-05-06
Attending: FAMILY MEDICINE
Payer: MEDICAID

## 2019-05-06 ENCOUNTER — TELEPHONE (OUTPATIENT)
Dept: FAMILY MEDICINE CLINIC | Facility: CLINIC | Age: 49
End: 2019-05-06

## 2019-05-06 ENCOUNTER — HOSPITAL ENCOUNTER (OUTPATIENT)
Dept: MAMMOGRAPHY | Facility: HOSPITAL | Age: 49
Discharge: HOME | End: 2019-05-06
Attending: FAMILY MEDICINE
Payer: MEDICAID

## 2019-05-06 DIAGNOSIS — N63.20 MASS OF LEFT BREAST: ICD-10-CM

## 2019-05-06 DIAGNOSIS — N63.20 MASS OF LEFT BREAST: Primary | ICD-10-CM

## 2019-05-06 PROCEDURE — 76642 ULTRASOUND BREAST LIMITED: CPT | Performed by: FAMILY MEDICINE

## 2019-05-06 PROCEDURE — 77065 DX MAMMO INCL CAD UNI: CPT | Performed by: FAMILY MEDICINE

## 2019-05-06 PROCEDURE — 77061 BREAST TOMOSYNTHESIS UNI: CPT | Performed by: FAMILY MEDICINE

## 2019-05-06 NOTE — TELEPHONE ENCOUNTER
Roberto called in stating that the Radiologist is requestuing a left diagnostic JOSE pt is currently waiting at the Radiology Dept.  Ext. [phone]

## 2019-05-07 NOTE — IMAGING NOTE
This nurse  CALLED [phone] AMintroduced self and role of breast coordinator. Discussed recommended breast biopsy with patient. Pt was recommended by DR Huang More  to have a  LEFT  breast ultrasound guided biopsy.  SHE DENIES NEED FOR  WAS ABLE TO A nipple, corresponding to a previously labeled mass at 09:00 o'clock 6 cm from the nipple, similar in morphology in shape, not significantly changed, documenting approximate 6 month stability.      The findings and recommendations were discussed with the pat this procedure. The US technician will use the ultrasound machine to locate the area in question that was seen on your previous breast imaging. The Radiologist will then inject a local numbing medication into the area.  This may burn and sting for several communicated by their ordering physician unless otherwise indicated. Educated patient that once we receive an order from her physician  our radiology secretaries would be calling her to schedule the biopsy.

## 2019-05-11 ENCOUNTER — APPOINTMENT (OUTPATIENT)
Dept: LAB | Facility: HOSPITAL | Age: 49
End: 2019-05-11
Attending: FAMILY MEDICINE
Payer: MEDICAID

## 2019-05-11 DIAGNOSIS — R79.89 ELEVATED TSH: ICD-10-CM

## 2019-05-11 PROCEDURE — 84443 ASSAY THYROID STIM HORMONE: CPT

## 2019-05-11 PROCEDURE — 36415 COLL VENOUS BLD VENIPUNCTURE: CPT

## 2019-05-15 ENCOUNTER — HOSPITAL ENCOUNTER (OUTPATIENT)
Dept: ULTRASOUND IMAGING | Facility: HOSPITAL | Age: 49
Discharge: HOME | End: 2019-05-15
Attending: FAMILY MEDICINE
Payer: MEDICAID

## 2019-05-15 ENCOUNTER — HOSPITAL ENCOUNTER (OUTPATIENT)
Dept: MAMMOGRAPHY | Facility: HOSPITAL | Age: 49
Discharge: HOME | End: 2019-05-15
Attending: FAMILY MEDICINE
Payer: MEDICAID

## 2019-05-15 VITALS — SYSTOLIC BLOOD PRESSURE: 132 MMHG | DIASTOLIC BLOOD PRESSURE: 87 MMHG | HEART RATE: 68 BPM

## 2019-05-15 DIAGNOSIS — N63.20 MASS OF LEFT BREAST: ICD-10-CM

## 2019-05-15 PROCEDURE — 19083 BX BREAST 1ST LESION US IMAG: CPT | Performed by: FAMILY MEDICINE

## 2019-05-15 PROCEDURE — 77065 DX MAMMO INCL CAD UNI: CPT | Performed by: FAMILY MEDICINE

## 2019-05-15 PROCEDURE — 88305 TISSUE EXAM BY PATHOLOGIST: CPT | Performed by: FAMILY MEDICINE

## 2019-05-15 NOTE — PROCEDURES
[facility]  Procedure Note    Stevan Nyhan Covarrubias Patient Status:  Outpatient    1970 MRN [id number]   Location [address] [facility] Attending Mily Saba DO   Hosp Day # 0 PCP DO JORJE Kohler

## 2019-05-16 ENCOUNTER — APPOINTMENT (OUTPATIENT)
Dept: LAB | Age: 49
End: 2019-05-16
Attending: FAMILY MEDICINE
Payer: MEDICAID

## 2019-05-16 ENCOUNTER — OFFICE VISIT (OUTPATIENT)
Dept: FAMILY MEDICINE CLINIC | Facility: CLINIC | Age: 49
End: 2019-05-16
Payer: MEDICAID

## 2019-05-16 VITALS
WEIGHT: 152 LBS | TEMPERATURE: 98 F | HEART RATE: 90 BPM | SYSTOLIC BLOOD PRESSURE: 130 MMHG | HEIGHT: 60 IN | DIASTOLIC BLOOD PRESSURE: 85 MMHG | RESPIRATION RATE: 16 BRPM | BODY MASS INDEX: 29.84 KG/M2

## 2019-05-16 DIAGNOSIS — F32.A DEPRESSIVE DISORDER: ICD-10-CM

## 2019-05-16 DIAGNOSIS — R79.89 ELEVATED TSH: ICD-10-CM

## 2019-05-16 DIAGNOSIS — Z01.00 ENCOUNTER FOR VISION SCREENING: ICD-10-CM

## 2019-05-16 DIAGNOSIS — R79.89 ELEVATED TSH: Primary | ICD-10-CM

## 2019-05-16 PROCEDURE — 36415 COLL VENOUS BLD VENIPUNCTURE: CPT

## 2019-05-16 PROCEDURE — 86376 MICROSOMAL ANTIBODY EACH: CPT

## 2019-05-16 PROCEDURE — 99214 OFFICE O/P EST MOD 30 MIN: CPT | Performed by: FAMILY MEDICINE

## 2019-05-16 PROCEDURE — 99212 OFFICE O/P EST SF 10 MIN: CPT | Performed by: FAMILY MEDICINE

## 2019-05-16 RX ORDER — ESCITALOPRAM OXALATE 10 MG/1
10 TABLET ORAL DAILY
Qty: 90 TABLET | Refills: 0 | Status: SHIPPED | OUTPATIENT
Start: 2019-05-16 | End: 2019-08-06

## 2019-05-16 NOTE — PROGRESS NOTES
Patient ID: Meliza Allen is a 52year old female. HPI  Patient presents with:  Thyroid Problem: lab results     She has lost weight since last visit. She has been compliant with phentermine and has been on it for 6 months.   She would 04/06/2019       Lab Results   Component Value Date    GLU 99 04/06/2019    BUN 13 04/06/2019    CREATSERUM 0.71 04/06/2019    BUNCREA 18.3 04/06/2019    ANIONGAP 4 04/06/2019    GFRAA 116 04/06/2019    GFRNAA 100 04/06/2019    CA 9.4 04/06/2019     Negative for chills and fever. HENT: Negative for voice change. Respiratory: Negative for chest tightness and shortness of breath. Cardiovascular: Negative for chest pain. Gastrointestinal: Negative for abdominal pain.    Skin: Negative for color bilaterally. Skin: Skin is warm. Psychiatry: Normal mood and affect     Vitals reviewed. ASSESSMENT/PLAN:     Diagnoses and all orders for this visit:    Elevated TSH  -     THYROID PEROXIDASE (TPO) AB;  Future  TSH to come back down to normal b

## 2019-05-16 NOTE — IMAGING NOTE
. This  nurse called at [id number]  Larryfarida Pitts   and informed her  of the following results and recommendations from Dr Sana Salazar radiologist.  See dictated report below:      PATHOLOGY:   Left breast, cores x6; ultrasound-guided core

## 2019-05-23 NOTE — PROGRESS NOTES
Patient contacted  (Name and  of pt verified). All results and recommendations reviewed. Patient verbalizes understanding, denies further questions and agrees with plan of care. States that she will jean claude and make a note to her calendar about this.     Debara Bosworth

## 2019-06-03 ENCOUNTER — OFFICE VISIT (OUTPATIENT)
Dept: PODIATRY CLINIC | Facility: CLINIC | Age: 49
End: 2019-06-03
Payer: MEDICAID

## 2019-06-03 DIAGNOSIS — B35.1 ONYCHOMYCOSIS: Primary | ICD-10-CM

## 2019-06-03 PROCEDURE — 99212 OFFICE O/P EST SF 10 MIN: CPT | Performed by: PODIATRIST

## 2019-06-28 ENCOUNTER — OFFICE VISIT (OUTPATIENT)
Dept: OPTOMETRY | Facility: CLINIC | Age: 49
End: 2019-06-28
Payer: MEDICAID

## 2019-06-28 DIAGNOSIS — H52.203 HYPEROPIA WITH ASTIGMATISM AND PRESBYOPIA, BILATERAL: ICD-10-CM

## 2019-06-28 DIAGNOSIS — H25.13 AGE-RELATED NUCLEAR CATARACT OF BOTH EYES: Primary | ICD-10-CM

## 2019-06-28 DIAGNOSIS — H52.4 HYPEROPIA WITH ASTIGMATISM AND PRESBYOPIA, BILATERAL: ICD-10-CM

## 2019-06-28 DIAGNOSIS — H52.03 HYPEROPIA WITH ASTIGMATISM AND PRESBYOPIA, BILATERAL: ICD-10-CM

## 2019-06-28 PROCEDURE — 92015 DETERMINE REFRACTIVE STATE: CPT | Performed by: OPTOMETRIST

## 2019-06-28 PROCEDURE — 92014 COMPRE OPH EXAM EST PT 1/>: CPT | Performed by: OPTOMETRIST

## 2019-06-28 NOTE — PATIENT INSTRUCTIONS
Hyperopia with astigmatism and presbyopia, bilateral  New glasses RX given to update as needed. Age-related nuclear cataract of both eyes  No treatment is required. Will continue to observe.

## 2019-06-28 NOTE — PROGRESS NOTES
Gustavo Powers is a 52year old female. HPI:     HPI     Patient is in for an annual eye exam. She has progressives that she does not feel comfortable wearing walking around  so just uses for reading and computer work.     Last edited by 20/20 -1 20/20    Near cc 4pt 4pt    Correction:  Glasses          Tonometry (Non-contact air puff, 2:07 PM)       Right Left    Pressure 11 11          Pupils       Pupils    Right PERRL    Left PERRL          Visual Fields       Left Right     Full Full ordered in this encounter        Follow up instructions:  Return in about 1 year (around 6/28/2020) for Eye Exam.    6/28/2019  Scribed by: Araceli Alvarez

## 2019-08-02 DIAGNOSIS — E66.09 NON MORBID OBESITY DUE TO EXCESS CALORIES: ICD-10-CM

## 2019-08-02 RX ORDER — PHENTERMINE HYDROCHLORIDE 30 MG/1
CAPSULE ORAL
Qty: 30 CAPSULE | Refills: 0 | Status: SHIPPED | OUTPATIENT
Start: 2019-08-02 | End: 2019-09-17

## 2019-08-02 NOTE — TELEPHONE ENCOUNTER
Controlled medication pending for review. If approved needs to be called in or faxed by on-site staff.     Last Rx: 4/4/19  LOV: 5/16/19

## 2019-08-06 ENCOUNTER — OFFICE VISIT (OUTPATIENT)
Dept: FAMILY MEDICINE CLINIC | Facility: CLINIC | Age: 49
End: 2019-08-06
Payer: MEDICAID

## 2019-08-06 VITALS
DIASTOLIC BLOOD PRESSURE: 84 MMHG | HEART RATE: 92 BPM | HEIGHT: 60 IN | WEIGHT: 147.63 LBS | BODY MASS INDEX: 28.98 KG/M2 | SYSTOLIC BLOOD PRESSURE: 121 MMHG | TEMPERATURE: 98 F

## 2019-08-06 DIAGNOSIS — F41.9 ANXIETY: ICD-10-CM

## 2019-08-06 DIAGNOSIS — R45.4 ANGER REACTION: ICD-10-CM

## 2019-08-06 DIAGNOSIS — Z72.820 LACK OF ADEQUATE SLEEP: ICD-10-CM

## 2019-08-06 DIAGNOSIS — F32.A DEPRESSIVE DISORDER: Primary | ICD-10-CM

## 2019-08-06 DIAGNOSIS — E66.3 OVERWEIGHT (BMI 25.0-29.9): ICD-10-CM

## 2019-08-06 PROCEDURE — 99214 OFFICE O/P EST MOD 30 MIN: CPT | Performed by: FAMILY MEDICINE

## 2019-08-06 RX ORDER — ESCITALOPRAM OXALATE 10 MG/1
10 TABLET ORAL DAILY
Qty: 90 TABLET | Refills: 0 | Status: SHIPPED | OUTPATIENT
Start: 2019-08-06 | End: 2019-10-14

## 2019-08-06 NOTE — PATIENT INSTRUCTIONS
You should really try to get at least 6 to 7 hours of sleep nightly . 3 to 4 hours of sleep is unacceptable and very unhealthy for you in the long-term.

## 2019-08-06 NOTE — PROGRESS NOTES
Patient ID: Presley Lundy is a 52year old female. HPI  Patient presents with:  Weight Check: weight management fup    The patient is continuing to lose weight at an appropriate weight. She is on phentermine, denies any side effect.  Sh Obesity        Past Surgical History:   Procedure Laterality Date   • HYSTERECTOMY  2009    LORNA for heavy menses, anemia   • LIPOMA REMOVAL Left 2005    Lipoma removal of left flank   • NEEDLE BIOPSY RIGHT  05/2017    benign outside breast bx natalia   • OT off it for a month but had frustration and anger and worsening anxiety and depression. Anger reaction  As above  Anxiety  As above  Overweight (BMI 25.0-29. 9)  Continue the phentermine.   I think she can stay in it for a few more months but that will be

## 2019-09-17 ENCOUNTER — TELEPHONE (OUTPATIENT)
Dept: FAMILY MEDICINE CLINIC | Facility: CLINIC | Age: 49
End: 2019-09-17

## 2019-09-17 DIAGNOSIS — E66.09 NON MORBID OBESITY DUE TO EXCESS CALORIES: ICD-10-CM

## 2019-09-17 RX ORDER — PHENTERMINE HYDROCHLORIDE 30 MG/1
CAPSULE ORAL
Qty: 30 CAPSULE | Refills: 0 | OUTPATIENT
Start: 2019-09-17 | End: 2019-10-14

## 2019-09-17 NOTE — TELEPHONE ENCOUNTER
Phentermine sent in but I should see her around October 14, 2019 or soon afterwards to see how she is doing with her mood as well.

## 2019-09-17 NOTE — TELEPHONE ENCOUNTER
Controlled medication pending for review. If approved needs to be called in or faxed by on-site staff.     Last Rx: 8/2/19  LOV: 8/6/19

## 2019-09-21 ENCOUNTER — TELEPHONE (OUTPATIENT)
Dept: FAMILY MEDICINE CLINIC | Facility: CLINIC | Age: 49
End: 2019-09-21

## 2019-09-21 NOTE — TELEPHONE ENCOUNTER
Current Outpatient Medications:  PHENTERMINE HCL 30 MG Oral Cap TAKE ONE CAPSULE BY MOUTH EVERY MORNING FOR WEIGHT LOSS Disp: 30 capsule Rfl: 0

## 2019-09-23 ENCOUNTER — TELEPHONE (OUTPATIENT)
Dept: FAMILY MEDICINE CLINIC | Facility: CLINIC | Age: 49
End: 2019-09-23

## 2019-09-23 NOTE — TELEPHONE ENCOUNTER
Clinical site staff please phone in prescription for phentermine, thank you.      PHENTERMINE HCL 30 MG Oral Cap 30 capsule 0 9/17/2019    Sig:   TAKE ONE CAPSULE BY MOUTH EVERY MORNING FOR WEIGHT LOSS     Route:   (none)     Class:   Phone in

## 2019-10-14 ENCOUNTER — APPOINTMENT (OUTPATIENT)
Dept: LAB | Age: 49
End: 2019-10-14
Attending: FAMILY MEDICINE
Payer: MEDICAID

## 2019-10-14 ENCOUNTER — OFFICE VISIT (OUTPATIENT)
Dept: FAMILY MEDICINE CLINIC | Facility: CLINIC | Age: 49
End: 2019-10-14
Payer: MEDICAID

## 2019-10-14 VITALS
TEMPERATURE: 98 F | WEIGHT: 145 LBS | HEART RATE: 87 BPM | BODY MASS INDEX: 28.47 KG/M2 | DIASTOLIC BLOOD PRESSURE: 88 MMHG | SYSTOLIC BLOOD PRESSURE: 131 MMHG | HEIGHT: 60 IN

## 2019-10-14 DIAGNOSIS — R45.4 ANGER REACTION: ICD-10-CM

## 2019-10-14 DIAGNOSIS — R05.9 COUGH: ICD-10-CM

## 2019-10-14 DIAGNOSIS — F32.A DEPRESSIVE DISORDER: Primary | ICD-10-CM

## 2019-10-14 DIAGNOSIS — R79.89 ELEVATED TSH: ICD-10-CM

## 2019-10-14 DIAGNOSIS — E66.09 NON MORBID OBESITY DUE TO EXCESS CALORIES: ICD-10-CM

## 2019-10-14 DIAGNOSIS — N63.20 MASS OF LEFT BREAST: ICD-10-CM

## 2019-10-14 DIAGNOSIS — D24.2 FIBROADENOMA OF LEFT BREAST: ICD-10-CM

## 2019-10-14 DIAGNOSIS — Z23 NEED FOR VACCINATION: ICD-10-CM

## 2019-10-14 DIAGNOSIS — G47.33 MILD OBSTRUCTIVE SLEEP APNEA: ICD-10-CM

## 2019-10-14 DIAGNOSIS — E66.3 OVERWEIGHT (BMI 25.0-29.9): ICD-10-CM

## 2019-10-14 DIAGNOSIS — M21.611 BUNION, RIGHT: ICD-10-CM

## 2019-10-14 DIAGNOSIS — J30.89 OTHER ALLERGIC RHINITIS: ICD-10-CM

## 2019-10-14 DIAGNOSIS — H81.10 BENIGN PAROXYSMAL POSITIONAL VERTIGO, UNSPECIFIED LATERALITY: ICD-10-CM

## 2019-10-14 DIAGNOSIS — F41.9 ANXIETY: ICD-10-CM

## 2019-10-14 PROCEDURE — 90471 IMMUNIZATION ADMIN: CPT | Performed by: FAMILY MEDICINE

## 2019-10-14 PROCEDURE — 84443 ASSAY THYROID STIM HORMONE: CPT

## 2019-10-14 PROCEDURE — 36415 COLL VENOUS BLD VENIPUNCTURE: CPT

## 2019-10-14 PROCEDURE — 90686 IIV4 VACC NO PRSV 0.5 ML IM: CPT | Performed by: FAMILY MEDICINE

## 2019-10-14 PROCEDURE — 99215 OFFICE O/P EST HI 40 MIN: CPT | Performed by: FAMILY MEDICINE

## 2019-10-14 RX ORDER — MECLIZINE HCL 12.5 MG/1
12.5 TABLET ORAL NIGHTLY PRN
Qty: 30 TABLET | Refills: 0 | Status: SHIPPED | OUTPATIENT
Start: 2019-10-14 | End: 2021-09-24

## 2019-10-14 RX ORDER — ESCITALOPRAM OXALATE 10 MG/1
10 TABLET ORAL DAILY
Qty: 90 TABLET | Refills: 0 | Status: SHIPPED | OUTPATIENT
Start: 2019-10-14 | End: 2020-08-28

## 2019-10-14 RX ORDER — PHENTERMINE HYDROCHLORIDE 30 MG/1
CAPSULE ORAL
Qty: 30 CAPSULE | Refills: 2 | Status: SHIPPED | OUTPATIENT
Start: 2019-10-14 | End: 2020-04-11

## 2019-10-14 RX ORDER — FLUTICASONE PROPIONATE 50 MCG
2 SPRAY, SUSPENSION (ML) NASAL DAILY
Qty: 1 BOTTLE | Refills: 0 | COMMUNITY
Start: 2019-10-14 | End: 2020-12-21

## 2019-10-14 NOTE — PROGRESS NOTES
Patient ID: Ramon Morgan is a 52year old female. HPI  Patient presents with:   Follow - Up  Other: Foot issue  Dizziness: vertigo  Cough    Wants to follow-up for some of the past issues but also has new issues she wants to discuss at first and fifth toe area of the right foot as the site of growth and pain. Thinks she is due for mammogram.  She states she did have a mass in her left breast and she needs me to review that mammogram again.   Had a left breast biopsy and needed a 6 reema History:   Procedure Laterality Date   • HYSTERECTOMY  2009    LORNA for heavy menses, anemia   • LIPOMA REMOVAL Left 2005    Lipoma removal of left flank   • NEEDLE BIOPSY RIGHT  05/2017    benign outside breast bx natalia   • OTHER SURGICAL HISTORY  05/01/2 disorder  -     escitalopram 10 MG Oral Tab; Take 1 tablet (10 mg total) by mouth daily. For mood  She is very happy that she restarted the Lexapro and will continue. Her depression, anger, anxiety are much better.     Anger reaction  -     escitalopram 10 Encounter      Podiatry- Dr Joseluis Sin Comments:              Ho Collazo.                            +++++(FOOT DOCTOR)++++++.           Referral Priority:Routine          Referral Type:OFFICE VISIT          Referred to Pr

## 2019-11-04 DIAGNOSIS — F32.A DEPRESSIVE DISORDER: ICD-10-CM

## 2019-11-06 ENCOUNTER — TELEPHONE (OUTPATIENT)
Dept: OTHER | Age: 49
End: 2019-11-06

## 2019-11-06 ENCOUNTER — HOSPITAL ENCOUNTER (OUTPATIENT)
Dept: MAMMOGRAPHY | Facility: HOSPITAL | Age: 49
Discharge: HOME | End: 2019-11-06
Attending: FAMILY MEDICINE
Payer: MEDICAID

## 2019-11-06 DIAGNOSIS — D24.2 FIBROADENOMA OF LEFT BREAST: ICD-10-CM

## 2019-11-06 DIAGNOSIS — Z12.31 VISIT FOR SCREENING MAMMOGRAM: Primary | ICD-10-CM

## 2019-11-06 RX ORDER — ESCITALOPRAM OXALATE 10 MG/1
TABLET ORAL
Qty: 90 TABLET | Refills: 1 | OUTPATIENT
Start: 2019-11-06

## 2019-11-06 NOTE — TELEPHONE ENCOUNTER
Received call from JEFF dept, stated Pt came for f/u Jeff of Left breast but it's also time for her annual JEFF- need new order- will have Pt to Reschedule      *Nurse please call Pt when ordered

## 2019-11-06 NOTE — TELEPHONE ENCOUNTER
Contacted pharmacist who stated they did still have refill sent from 10/14/19 and will fill that now.

## 2019-11-06 NOTE — TELEPHONE ENCOUNTER
Refill passed per [facility] protocol.     Refill Protocol Appointment Criteria  · Appointment scheduled in the past 6 months or in the next 3 months  Recent Outpatient Visits            3 weeks ago Depressive disorder    [facility], [address],

## 2019-11-13 ENCOUNTER — TELEPHONE (OUTPATIENT)
Dept: FAMILY MEDICINE CLINIC | Facility: CLINIC | Age: 49
End: 2019-11-13

## 2019-11-13 NOTE — TELEPHONE ENCOUNTER
[facility] is calling regarding mammogram orders. Central Scheduler is requesting that diagnosis from the left breast mammogram be added to the mammogram bilateral order. Please advise.

## 2019-11-15 ENCOUNTER — NURSE TRIAGE (OUTPATIENT)
Dept: FAMILY MEDICINE CLINIC | Facility: CLINIC | Age: 49
End: 2019-11-15

## 2019-11-20 ENCOUNTER — HOSPITAL ENCOUNTER (OUTPATIENT)
Dept: ULTRASOUND IMAGING | Facility: HOSPITAL | Age: 49
Discharge: HOME | End: 2019-11-20
Attending: FAMILY MEDICINE
Payer: MEDICAID

## 2019-11-20 ENCOUNTER — HOSPITAL ENCOUNTER (OUTPATIENT)
Dept: MAMMOGRAPHY | Facility: HOSPITAL | Age: 49
Discharge: HOME | End: 2019-11-20
Attending: FAMILY MEDICINE
Payer: MEDICAID

## 2019-11-20 DIAGNOSIS — Z12.31 VISIT FOR SCREENING MAMMOGRAM: ICD-10-CM

## 2019-11-20 DIAGNOSIS — D24.2 FIBROADENOMA OF LEFT BREAST: ICD-10-CM

## 2019-11-20 PROCEDURE — 76642 ULTRASOUND BREAST LIMITED: CPT | Performed by: FAMILY MEDICINE

## 2019-11-20 PROCEDURE — 77062 BREAST TOMOSYNTHESIS BI: CPT | Performed by: FAMILY MEDICINE

## 2019-11-20 PROCEDURE — 77066 DX MAMMO INCL CAD BI: CPT | Performed by: FAMILY MEDICINE

## 2019-12-09 ENCOUNTER — OFFICE VISIT (OUTPATIENT)
Dept: PODIATRY CLINIC | Facility: CLINIC | Age: 49
End: 2019-12-09
Payer: MEDICAID

## 2019-12-09 DIAGNOSIS — M21.621 TAILOR'S BUNION OF RIGHT FOOT: Primary | ICD-10-CM

## 2019-12-09 DIAGNOSIS — M21.611 BUNION OF GREAT TOE OF RIGHT FOOT: ICD-10-CM

## 2019-12-09 PROCEDURE — 99213 OFFICE O/P EST LOW 20 MIN: CPT | Performed by: PODIATRIST

## 2019-12-09 NOTE — PROGRESS NOTES
HPI:    Patient ID: Karyn Cha is a 52year old female. 44-year-old female presents to the office with a new concern. She says she is noticing the bumps on her feet to become larger and she is wondering about what to do.   She points t indication of surgery at some  point if they become symptomatic.   I have answered this patient's questions I believe her to be well-informed follow-up as needed           ASSESSMENT/PLAN:   Tailor's bunion of right foot  (primary encounter diagnosis)  Armeni

## 2020-04-11 ENCOUNTER — NURSE TRIAGE (OUTPATIENT)
Dept: OTHER | Age: 50
End: 2020-04-11

## 2020-04-11 ENCOUNTER — OFFICE VISIT (OUTPATIENT)
Dept: FAMILY MEDICINE CLINIC | Facility: CLINIC | Age: 50
End: 2020-04-11
Payer: MEDICAID

## 2020-04-11 ENCOUNTER — HOSPITAL ENCOUNTER (OUTPATIENT)
Dept: GENERAL RADIOLOGY | Age: 50
Discharge: HOME | End: 2020-04-11
Attending: FAMILY MEDICINE
Payer: MEDICAID

## 2020-04-11 VITALS
WEIGHT: 154 LBS | SYSTOLIC BLOOD PRESSURE: 118 MMHG | HEIGHT: 60 IN | TEMPERATURE: 99 F | DIASTOLIC BLOOD PRESSURE: 78 MMHG | BODY MASS INDEX: 30.23 KG/M2 | HEART RATE: 68 BPM

## 2020-04-11 DIAGNOSIS — S80.02XA CONTUSION OF LEFT KNEE, INITIAL ENCOUNTER: Primary | ICD-10-CM

## 2020-04-11 DIAGNOSIS — M70.42 PREPATELLAR BURSITIS OF LEFT KNEE: ICD-10-CM

## 2020-04-11 DIAGNOSIS — M25.662 DECREASED RANGE OF MOTION OF LEFT KNEE: ICD-10-CM

## 2020-04-11 DIAGNOSIS — E66.09 NON MORBID OBESITY DUE TO EXCESS CALORIES: ICD-10-CM

## 2020-04-11 DIAGNOSIS — S80.02XA CONTUSION OF LEFT KNEE, INITIAL ENCOUNTER: ICD-10-CM

## 2020-04-11 PROCEDURE — 99214 OFFICE O/P EST MOD 30 MIN: CPT | Performed by: FAMILY MEDICINE

## 2020-04-11 PROCEDURE — 73564 X-RAY EXAM KNEE 4 OR MORE: CPT | Performed by: FAMILY MEDICINE

## 2020-04-11 RX ORDER — PHENTERMINE HYDROCHLORIDE 30 MG/1
CAPSULE ORAL
Qty: 30 CAPSULE | Refills: 1 | Status: SHIPPED | OUTPATIENT
Start: 2020-04-11 | End: 2020-09-10

## 2020-04-11 NOTE — TELEPHONE ENCOUNTER
The patient should be seen. She had trauma to the knee by hitting her knee on the floor. I will have our staff call her to see if she can come in right now to be evaluated.

## 2020-04-11 NOTE — PROGRESS NOTES
Patient ID: Larry Pitts is a 48year old female.     HPI  Patient presents with:  Knee Pain: left knee x 3 days  fell on wednesday     Telephone message from 11:02AM today 4/11/20:  SUMMARY:   Does patient need to be seen or have  teleph stand after the fall. Her  and children were present when she fell. She had swelling of her knee instantly after the fall. She has been trying to walk normally but has been limping due to the pain.  She has a knee sleeve  at home but has not been Obesity        Past Surgical History:   Procedure Laterality Date   • HYSTERECTOMY  2009    LORNA for heavy menses, anemia   • LIPOMA REMOVAL Left 2005    Lipoma removal of left flank   • NEEDLE BIOPSY LEFT  05/15/2019    US bx   • NEEDLE BIOPSY RIGHT  05/20 Negative      Posterior Drawer Negative      Lachman Negative      Pivot Shift Negative              Varus Stress       0 Degrees Negative      30 Degrees Negative       Valgus Stress        0 Degrees Negative      30 Degrees Negative       Patellar appreh BY MOUTH EVERY MORNING FOR WEIGHT LOSS  While here wanted to talk about getting back on phentermine. She is not having any side effects from this.   I went ahead and started her back on phentermine which she has tolerated in the past.        Follow up if s

## 2020-04-11 NOTE — TELEPHONE ENCOUNTER
Action Requested: Summary for Provider     []  Critical Lab, Recommendations Needed  [x] Need Additional Advice  []   FYI    []   Need Orders  [] Need Medications Sent to Pharmacy  []  Other     SUMMARY:    Does patient need to be seen or have  telephone v

## 2020-08-28 ENCOUNTER — HOSPITAL ENCOUNTER (OUTPATIENT)
Dept: GENERAL RADIOLOGY | Age: 50
Discharge: HOME | End: 2020-08-28
Attending: NURSE PRACTITIONER
Payer: MEDICAID

## 2020-08-28 ENCOUNTER — LAB ENCOUNTER (OUTPATIENT)
Dept: LAB | Age: 50
End: 2020-08-28
Attending: NURSE PRACTITIONER
Payer: MEDICAID

## 2020-08-28 ENCOUNTER — OFFICE VISIT (OUTPATIENT)
Dept: FAMILY MEDICINE CLINIC | Facility: CLINIC | Age: 50
End: 2020-08-28
Payer: MEDICAID

## 2020-08-28 VITALS
SYSTOLIC BLOOD PRESSURE: 128 MMHG | WEIGHT: 145 LBS | BODY MASS INDEX: 28.47 KG/M2 | HEIGHT: 60 IN | HEART RATE: 74 BPM | TEMPERATURE: 99 F | DIASTOLIC BLOOD PRESSURE: 84 MMHG

## 2020-08-28 DIAGNOSIS — R07.89 CHEST TIGHTNESS: Primary | ICD-10-CM

## 2020-08-28 DIAGNOSIS — F41.9 ANXIETY: ICD-10-CM

## 2020-08-28 DIAGNOSIS — R07.89 CHEST TIGHTNESS: ICD-10-CM

## 2020-08-28 DIAGNOSIS — F32.A DEPRESSIVE DISORDER: ICD-10-CM

## 2020-08-28 PROCEDURE — 71046 X-RAY EXAM CHEST 2 VIEWS: CPT | Performed by: NURSE PRACTITIONER

## 2020-08-28 PROCEDURE — 3008F BODY MASS INDEX DOCD: CPT | Performed by: NURSE PRACTITIONER

## 2020-08-28 PROCEDURE — 93005 ELECTROCARDIOGRAM TRACING: CPT

## 2020-08-28 PROCEDURE — 3079F DIAST BP 80-89 MM HG: CPT | Performed by: NURSE PRACTITIONER

## 2020-08-28 PROCEDURE — 3074F SYST BP LT 130 MM HG: CPT | Performed by: NURSE PRACTITIONER

## 2020-08-28 PROCEDURE — 99213 OFFICE O/P EST LOW 20 MIN: CPT | Performed by: NURSE PRACTITIONER

## 2020-08-28 PROCEDURE — 93010 ELECTROCARDIOGRAM REPORT: CPT | Performed by: NURSE PRACTITIONER

## 2020-08-28 RX ORDER — ESCITALOPRAM OXALATE 10 MG/1
10 TABLET ORAL DAILY
Qty: 90 TABLET | Refills: 0 | Status: SHIPPED | OUTPATIENT
Start: 2020-08-28 | End: 2020-11-27

## 2020-08-28 NOTE — PATIENT INSTRUCTIONS
Anxiety Reaction  Anxiety is the feeling we all get when we think something bad might happen. It is a normal response to stress and normally causes only a mild reaction. When anxiety becomes more severe, it can interfere with daily life.  In some cases, y · Unfortunately, many stressful situations can't be avoided. It is necessary to learn how to better manage stress. There are many proven methods that will reduce your anxiety.  These include simple things such as exercise, good nutrition, and adequate rest.

## 2020-08-28 NOTE — PROGRESS NOTES
HPI    Patient presents for chest pressure for the past week. Worse in the mornings when she wakes up and calms down as the day passes. Worse in the morning when she feels stressed.   Feels similar to something being stuck in her chest.  Has been treated Aunt 48         of complications of breast CA   • Breast Cancer Maternal Aunt 79       Social History    Socioeconomic History      Marital status:       Spouse name: Not on file      Number of children: Not on file      Years of education: Not Suspension 2 sprays by Nasal route daily. She has over-the-counter medication at home and can start. 1 Bottle 0       Allergies:    Penicillins             OTHER (SEE COMMENTS)    Comment:Since a child    Physical Exam   Nursing note and vitals reviewed.

## 2020-09-10 ENCOUNTER — OFFICE VISIT (OUTPATIENT)
Dept: FAMILY MEDICINE CLINIC | Facility: CLINIC | Age: 50
End: 2020-09-10
Payer: MEDICAID

## 2020-09-10 VITALS
DIASTOLIC BLOOD PRESSURE: 82 MMHG | TEMPERATURE: 98 F | HEART RATE: 75 BPM | WEIGHT: 146.5 LBS | HEIGHT: 60 IN | BODY MASS INDEX: 28.76 KG/M2 | SYSTOLIC BLOOD PRESSURE: 138 MMHG

## 2020-09-10 DIAGNOSIS — J30.89 OTHER ALLERGIC RHINITIS: ICD-10-CM

## 2020-09-10 DIAGNOSIS — Z00.00 ADULT GENERAL MEDICAL EXAM: Primary | ICD-10-CM

## 2020-09-10 DIAGNOSIS — Z12.31 VISIT FOR SCREENING MAMMOGRAM: ICD-10-CM

## 2020-09-10 DIAGNOSIS — Z12.4 CERVICAL CANCER SCREENING: ICD-10-CM

## 2020-09-10 DIAGNOSIS — G47.33 MILD OBSTRUCTIVE SLEEP APNEA: ICD-10-CM

## 2020-09-10 DIAGNOSIS — Z12.11 SCREENING FOR COLON CANCER: ICD-10-CM

## 2020-09-10 DIAGNOSIS — E66.09 NON MORBID OBESITY DUE TO EXCESS CALORIES: ICD-10-CM

## 2020-09-10 DIAGNOSIS — G47.9 SLEEP DISORDER: ICD-10-CM

## 2020-09-10 DIAGNOSIS — R45.4 ANGER REACTION: ICD-10-CM

## 2020-09-10 DIAGNOSIS — F41.9 ANXIETY: ICD-10-CM

## 2020-09-10 DIAGNOSIS — F32.A DEPRESSIVE DISORDER: ICD-10-CM

## 2020-09-10 DIAGNOSIS — L30.9 ECZEMA OF RIGHT HAND: ICD-10-CM

## 2020-09-10 DIAGNOSIS — E66.3 OVERWEIGHT (BMI 25.0-29.9): ICD-10-CM

## 2020-09-10 PROCEDURE — 99214 OFFICE O/P EST MOD 30 MIN: CPT | Performed by: FAMILY MEDICINE

## 2020-09-10 PROCEDURE — 3008F BODY MASS INDEX DOCD: CPT | Performed by: FAMILY MEDICINE

## 2020-09-10 PROCEDURE — 3075F SYST BP GE 130 - 139MM HG: CPT | Performed by: FAMILY MEDICINE

## 2020-09-10 PROCEDURE — 99396 PREV VISIT EST AGE 40-64: CPT | Performed by: FAMILY MEDICINE

## 2020-09-10 PROCEDURE — 3079F DIAST BP 80-89 MM HG: CPT | Performed by: FAMILY MEDICINE

## 2020-09-10 RX ORDER — PHENTERMINE HYDROCHLORIDE 30 MG/1
CAPSULE ORAL
Qty: 30 CAPSULE | Refills: 2 | Status: SHIPPED | OUTPATIENT
Start: 2020-09-10 | End: 2021-02-18

## 2020-09-10 NOTE — PROGRESS NOTES
Patient ID: Tia Gale is a 48year old female. HPI  Patient presents with:  Physical    Last physical on 4/4/2020. Pt is  and works as a housewife. She smokes about 1 - 2 cigarettes per day.     The patient denies any CP, SOB, b mammogram around 11/20/2020. Pt has never had a colonoscopy. Immunizations are utd.       Tobacco Cessation Counseling 2 Years due on 02/19/1982  Pap Smear,3 Years due on 02/19/2001  FIT Colorectal Screening due on 02/19/2020  Colonoscopy due on 02/1 04/06/2019    TRIG 40 04/06/2019    HDL 61 (H) 04/06/2019    LDL 96 04/06/2019    VLDL 8 04/06/2019    NONHDLC 104 04/06/2019     Free T4 (ng/dL)   Date Value   04/06/2019 1.0     TSH (mIU/mL)   Date Value   10/14/2019 2.810     =========================== OTHER SURGICAL HISTORY  05/01/2017    right breast biopsy        Social History    Socioeconomic History      Marital status:       Spouse name: Not on file      Number of children: Not on file      Years of education: Not on file      Highest educa route daily. She has over-the-counter medication at home and can start. 1 Bottle 0     Allergies:  Penicillins             OTHER (SEE COMMENTS)    Comment:Since a child   PHYSICAL EXAM:   Physical Exam      Physical Exam   Constitutional: .  She appears wel Future  -     ASSAY, THYROID STIM HORMONE; Future    Depressive disorder  -      NAVIGATOR  I think she would do good speaking to a therapist as I do not think medications themselves are going to help her 100%. She is willing to give this a try.   Esther Michelle OBG - Dr Cook#2          Referral Priority:Routine          Referral Type:OFFICE VISIT          Referred to Provider:Vivien Cook MD          Requested Specialty:OBSTETRICS & GYNECOLOGY          Number of Visits Requested:3      Follow up if sympto

## 2020-09-10 NOTE — PATIENT INSTRUCTIONS
Can try melatonin over-the-counter. Start with 3 mg every night and do this for 2 weeks. If still having trouble sleeping then double up and start taking 6 mg at nighttime.

## 2020-09-15 ENCOUNTER — LAB ENCOUNTER (OUTPATIENT)
Dept: LAB | Age: 50
End: 2020-09-15
Attending: FAMILY MEDICINE
Payer: MEDICAID

## 2020-09-15 DIAGNOSIS — Z00.00 ADULT GENERAL MEDICAL EXAM: ICD-10-CM

## 2020-09-15 LAB
ALBUMIN SERPL-MCNC: 3.6 G/DL (ref 3.4–5)
ALBUMIN/GLOB SERPL: 1 {RATIO} (ref 1–2)
ALP LIVER SERPL-CCNC: 74 U/L (ref 39–100)
ALT SERPL-CCNC: 23 U/L (ref 13–56)
ANION GAP SERPL CALC-SCNC: 1 MMOL/L (ref 0–18)
AST SERPL-CCNC: 19 U/L (ref 15–37)
BASOPHILS # BLD AUTO: 0.04 X10(3) UL (ref 0–0.2)
BASOPHILS NFR BLD AUTO: 0.8 %
BILIRUB SERPL-MCNC: 0.9 MG/DL (ref 0.1–2)
BUN BLD-MCNC: 17 MG/DL (ref 7–18)
BUN/CREAT SERPL: 25.8 (ref 10–20)
CALCIUM BLD-MCNC: 9.2 MG/DL (ref 8.5–10.1)
CHLORIDE SERPL-SCNC: 107 MMOL/L (ref 98–112)
CHOLEST SMN-MCNC: 164 MG/DL (ref ?–200)
CO2 SERPL-SCNC: 32 MMOL/L (ref 21–32)
CREAT BLD-MCNC: 0.66 MG/DL (ref 0.55–1.02)
DEPRECATED RDW RBC AUTO: 40.4 FL (ref 35.1–46.3)
EOSINOPHIL # BLD AUTO: 0.24 X10(3) UL (ref 0–0.7)
EOSINOPHIL NFR BLD AUTO: 4.8 %
ERYTHROCYTE [DISTWIDTH] IN BLOOD BY AUTOMATED COUNT: 12.6 % (ref 11–15)
GLOBULIN PLAS-MCNC: 3.5 G/DL (ref 2.8–4.4)
GLUCOSE BLD-MCNC: 102 MG/DL (ref 70–99)
HCT VFR BLD AUTO: 40.3 % (ref 35–48)
HDLC SERPL-MCNC: 62 MG/DL (ref 40–59)
HGB BLD-MCNC: 13.4 G/DL (ref 12–16)
IMM GRANULOCYTES # BLD AUTO: 0.01 X10(3) UL (ref 0–1)
IMM GRANULOCYTES NFR BLD: 0.2 %
LDLC SERPL CALC-MCNC: 93 MG/DL (ref ?–100)
LYMPHOCYTES # BLD AUTO: 1.51 X10(3) UL (ref 1–4)
LYMPHOCYTES NFR BLD AUTO: 30.4 %
M PROTEIN MFR SERPL ELPH: 7.1 G/DL (ref 6.4–8.2)
MCH RBC QN AUTO: 29.2 PG (ref 26–34)
MCHC RBC AUTO-ENTMCNC: 33.3 G/DL (ref 31–37)
MCV RBC AUTO: 87.8 FL (ref 80–100)
MONOCYTES # BLD AUTO: 0.39 X10(3) UL (ref 0.1–1)
MONOCYTES NFR BLD AUTO: 7.9 %
NEUTROPHILS # BLD AUTO: 2.77 X10 (3) UL (ref 1.5–7.7)
NEUTROPHILS # BLD AUTO: 2.77 X10(3) UL (ref 1.5–7.7)
NEUTROPHILS NFR BLD AUTO: 55.9 %
NONHDLC SERPL-MCNC: 102 MG/DL (ref ?–130)
OSMOLALITY SERPL CALC.SUM OF ELEC: 292 MOSM/KG (ref 275–295)
PATIENT FASTING Y/N/NP: YES
PATIENT FASTING Y/N/NP: YES
PLATELET # BLD AUTO: 270 10(3)UL (ref 150–450)
POTASSIUM SERPL-SCNC: 4.3 MMOL/L (ref 3.5–5.1)
RBC # BLD AUTO: 4.59 X10(6)UL (ref 3.8–5.3)
SODIUM SERPL-SCNC: 140 MMOL/L (ref 136–145)
TRIGL SERPL-MCNC: 47 MG/DL (ref 30–149)
TSI SER-ACNC: 4.72 MIU/ML (ref 0.36–3.74)
VLDLC SERPL CALC-MCNC: 9 MG/DL (ref 0–30)
WBC # BLD AUTO: 5 X10(3) UL (ref 4–11)

## 2020-09-15 PROCEDURE — 80053 COMPREHEN METABOLIC PANEL: CPT

## 2020-09-15 PROCEDURE — 84439 ASSAY OF FREE THYROXINE: CPT | Performed by: FAMILY MEDICINE

## 2020-09-15 PROCEDURE — 80061 LIPID PANEL: CPT

## 2020-09-15 PROCEDURE — 85025 COMPLETE CBC W/AUTO DIFF WBC: CPT

## 2020-09-15 PROCEDURE — 84481 FREE ASSAY (FT-3): CPT | Performed by: FAMILY MEDICINE

## 2020-09-15 PROCEDURE — 84443 ASSAY THYROID STIM HORMONE: CPT

## 2020-09-15 PROCEDURE — 36415 COLL VENOUS BLD VENIPUNCTURE: CPT

## 2020-09-28 ENCOUNTER — OFFICE VISIT (OUTPATIENT)
Dept: OBGYN CLINIC | Facility: CLINIC | Age: 50
End: 2020-09-28
Payer: MEDICAID

## 2020-09-28 VITALS — SYSTOLIC BLOOD PRESSURE: 122 MMHG | DIASTOLIC BLOOD PRESSURE: 80 MMHG | BODY MASS INDEX: 28 KG/M2 | WEIGHT: 142 LBS

## 2020-09-28 DIAGNOSIS — Z90.710 HISTORY OF HYSTERECTOMY FOR BENIGN DISEASE: ICD-10-CM

## 2020-09-28 DIAGNOSIS — Z01.419 WOMEN'S ANNUAL ROUTINE GYNECOLOGICAL EXAMINATION: Primary | ICD-10-CM

## 2020-09-28 DIAGNOSIS — R32 URINARY INCONTINENCE, UNSPECIFIED TYPE: ICD-10-CM

## 2020-09-28 PROCEDURE — 3079F DIAST BP 80-89 MM HG: CPT | Performed by: OBSTETRICS & GYNECOLOGY

## 2020-09-28 PROCEDURE — 3074F SYST BP LT 130 MM HG: CPT | Performed by: OBSTETRICS & GYNECOLOGY

## 2020-09-28 PROCEDURE — 99396 PREV VISIT EST AGE 40-64: CPT | Performed by: OBSTETRICS & GYNECOLOGY

## 2020-09-28 NOTE — PROGRESS NOTES
Annual Gyn Exam    HPI  Holly Urena is a known patient of mine and is here for an annual gynecologic evaluation. She had a previous hysterectomy for benign indications.   She has had prior history of urinary incontinence and has gone for pelvic floor physical ther Application topically 2 (two) times daily. As needed for dry cracked skin but do not use for extended periods of time. , Disp: 15 g, Rfl: 0  •  escitalopram 10 MG Oral Tab, Take 1 tablet (10 mg total) by mouth daily.  For mood, Disp: 90 tablet, Rfl: 0  •  Fl mass, no tenderness and no fullness. Genitourinary Comments: Uterus and cervix are surgically absent. Musculoskeletal:         General: No edema. Lymphadenopathy:     She has no cervical adenopathy.    Neurological: She is alert and oriented to per

## 2020-10-01 NOTE — H&P
[facility], [facility] - Gastroenterology                                                                                                               Reason for consult: crc screening    Requesting physician or provider: [facility] 2017    right breast biopsy       Family Hx:   Family History   Problem Relation Age of Onset   • Hypertension Mother    • Breast Cancer Maternal Aunt 48         of complications of breast CA   • Breast Cancer Maternal Aunt 79   • Ovarian Cancer symmetrical, trachea midline  CV: RRR, the extremities are warm and well perfused   LUNGS: No increased work of breathing  ABDOMEN: No scars, normal bowel sounds, soft, non-tender, non-distended no rebound or guarding, no masses, no hepatomegaly  MSK: No r 1.02 mg/dL    BUN/CREA Ratio 25.8 (H) 10.0 - 20.0    Calcium, Total 9.2 8.5 - 10.1 mg/dL    Calculated Osmolality 292 275 - 295 mOsm/kg    GFR, Non- 103 >=60    GFR, -American 119 >=60    ALT 23 13 - 56 U/L    AST 19 15 - 37 U/L    A This can be done by notifying the pharmacy or calling our office. Orders This Visit:  No orders of the defined types were placed in this encounter.       Meds This Visit:  Requested Prescriptions      No prescriptions requested or ordered in this encoun

## 2020-10-06 ENCOUNTER — TELEPHONE (OUTPATIENT)
Dept: GASTROENTEROLOGY | Facility: CLINIC | Age: 50
End: 2020-10-06

## 2020-10-06 ENCOUNTER — OFFICE VISIT (OUTPATIENT)
Dept: GASTROENTEROLOGY | Facility: CLINIC | Age: 50
End: 2020-10-06
Payer: MEDICAID

## 2020-10-06 VITALS
WEIGHT: 148.63 LBS | HEIGHT: 61 IN | DIASTOLIC BLOOD PRESSURE: 80 MMHG | SYSTOLIC BLOOD PRESSURE: 123 MMHG | HEART RATE: 78 BPM | BODY MASS INDEX: 28.06 KG/M2

## 2020-10-06 DIAGNOSIS — Z12.11 SCREENING FOR COLON CANCER: Primary | ICD-10-CM

## 2020-10-06 DIAGNOSIS — Z12.11 COLON CANCER SCREENING: Primary | ICD-10-CM

## 2020-10-06 PROCEDURE — 99243 OFF/OP CNSLTJ NEW/EST LOW 30: CPT | Performed by: NURSE PRACTITIONER

## 2020-10-06 PROCEDURE — 3008F BODY MASS INDEX DOCD: CPT | Performed by: NURSE PRACTITIONER

## 2020-10-06 PROCEDURE — 3074F SYST BP LT 130 MM HG: CPT | Performed by: NURSE PRACTITIONER

## 2020-10-06 PROCEDURE — 3079F DIAST BP 80-89 MM HG: CPT | Performed by: NURSE PRACTITIONER

## 2020-10-06 RX ORDER — POLYETHYLENE GLYCOL 3350, SODIUM CHLORIDE, SODIUM BICARBONATE, POTASSIUM CHLORIDE 420; 11.2; 5.72; 1.48 G/4L; G/4L; G/4L; G/4L
POWDER, FOR SOLUTION ORAL
Qty: 1 BOTTLE | Refills: 0 | Status: ON HOLD | OUTPATIENT
Start: 2020-10-06 | End: 2020-11-19

## 2020-10-06 NOTE — PATIENT INSTRUCTIONS
1. Schedule colonoscopy with [facility] w/ Dr. Candace Steven [Diagnosis: crc screening]    2.  bowel prep from pharmacy (split trilyte)    3. Hold phentermine 7 days prior to procedure    4. Read all bowel prep instructions carefully    5.  AVOID seeds, nuts, popco

## 2020-10-06 NOTE — TELEPHONE ENCOUNTER
Scheduled for: Colonoscopy 18967   Provider Name: Dr Efren Camarillo   Date: Truong Borne 11/19/2020   Location: [facility]  Sedation: [facility]   Time: 1:15 pm   Prep: split dose trilyte   Meds/Allergies Reconciled?: PCN  Diagnosis with codes: Screening Z12.11   Was patient informed t

## 2020-10-07 ENCOUNTER — TELEPHONE (OUTPATIENT)
Dept: FAMILY MEDICINE CLINIC | Facility: CLINIC | Age: 50
End: 2020-10-07

## 2020-10-07 NOTE — TELEPHONE ENCOUNTER
Vietnamese Speaking - Patient is requesting call back regarding results for thyroid tests. Please advise.      Call back # [phone]

## 2020-10-07 NOTE — TELEPHONE ENCOUNTER
Dr. Fransisco Rodriguez: can you review Free T4, and Free T3  (was added on due to elevated TSH). Please advise further.

## 2020-10-08 DIAGNOSIS — R79.89 ELEVATED TSH: Primary | ICD-10-CM

## 2020-11-16 ENCOUNTER — APPOINTMENT (OUTPATIENT)
Dept: LAB | Facility: HOSPITAL | Age: 50
End: 2020-11-16
Attending: INTERNAL MEDICINE
Payer: MEDICAID

## 2020-11-16 DIAGNOSIS — Z01.818 PRE-OP TESTING: ICD-10-CM

## 2020-11-19 ENCOUNTER — ANESTHESIA EVENT (OUTPATIENT)
Dept: ENDOSCOPY | Facility: HOSPITAL | Age: 50
End: 2020-11-19
Payer: MEDICAID

## 2020-11-19 ENCOUNTER — ANESTHESIA (OUTPATIENT)
Dept: ENDOSCOPY | Facility: HOSPITAL | Age: 50
End: 2020-11-19
Payer: MEDICAID

## 2020-11-19 ENCOUNTER — HOSPITAL ENCOUNTER (OUTPATIENT)
Facility: HOSPITAL | Age: 50
Setting detail: HOSPITAL OUTPATIENT SURGERY
Discharge: HOME | End: 2020-11-19
Attending: INTERNAL MEDICINE | Admitting: INTERNAL MEDICINE
Payer: MEDICAID

## 2020-11-19 DIAGNOSIS — Z01.818 PRE-OP TESTING: Primary | ICD-10-CM

## 2020-11-19 DIAGNOSIS — K63.3 EROSION OF TERMINAL ILEUM: ICD-10-CM

## 2020-11-19 DIAGNOSIS — Z12.11 SCREENING FOR COLON CANCER: ICD-10-CM

## 2020-11-19 DIAGNOSIS — K64.9 HEMORRHOIDS, UNSPECIFIED HEMORRHOID TYPE: ICD-10-CM

## 2020-11-19 PROCEDURE — 45380 COLONOSCOPY AND BIOPSY: CPT | Performed by: INTERNAL MEDICINE

## 2020-11-19 PROCEDURE — 0DBB8ZX EXCISION OF ILEUM, VIA NATURAL OR ARTIFICIAL OPENING ENDOSCOPIC, DIAGNOSTIC: ICD-10-PCS | Performed by: INTERNAL MEDICINE

## 2020-11-19 RX ORDER — NALOXONE HYDROCHLORIDE 0.4 MG/ML
80 INJECTION, SOLUTION INTRAMUSCULAR; INTRAVENOUS; SUBCUTANEOUS AS NEEDED
Status: DISCONTINUED | OUTPATIENT
Start: 2020-11-19 | End: 2020-11-19

## 2020-11-19 RX ORDER — LIDOCAINE HYDROCHLORIDE 10 MG/ML
INJECTION, SOLUTION EPIDURAL; INFILTRATION; INTRACAUDAL; PERINEURAL AS NEEDED
Status: DISCONTINUED | OUTPATIENT
Start: 2020-11-19 | End: 2020-11-19 | Stop reason: SURG

## 2020-11-19 RX ORDER — SODIUM CHLORIDE, SODIUM LACTATE, POTASSIUM CHLORIDE, CALCIUM CHLORIDE 600; 310; 30; 20 MG/100ML; MG/100ML; MG/100ML; MG/100ML
INJECTION, SOLUTION INTRAVENOUS CONTINUOUS
Status: DISCONTINUED | OUTPATIENT
Start: 2020-11-19 | End: 2020-11-19

## 2020-11-19 RX ADMIN — SODIUM CHLORIDE, SODIUM LACTATE, POTASSIUM CHLORIDE, CALCIUM CHLORIDE: 600; 310; 30; 20 INJECTION, SOLUTION INTRAVENOUS at 12:32:00

## 2020-11-19 RX ADMIN — SODIUM CHLORIDE, SODIUM LACTATE, POTASSIUM CHLORIDE, CALCIUM CHLORIDE: 600; 310; 30; 20 INJECTION, SOLUTION INTRAVENOUS at 12:56:00

## 2020-11-19 RX ADMIN — LIDOCAINE HYDROCHLORIDE 50 MG: 10 INJECTION, SOLUTION EPIDURAL; INFILTRATION; INTRACAUDAL; PERINEURAL at 12:34:00

## 2020-11-19 NOTE — ANESTHESIA POSTPROCEDURE EVALUATION
Patient: Presley Lundy    Procedure Summary     Date: 11/19/20 Room / Location: [facility] ENDOSCOPY 05 / [facility] ENDOSCOPY    Anesthesia Start: 6390 Anesthesia Stop:     Procedure: COLONOSCOPY (N/A ) Diagnosis:       Screening for colon cancer

## 2020-11-19 NOTE — H&P
Pre Procedure History & Physical Examination    Patient Name: Larry Pitts  MRN: [id number]  [facility]: [id number]  YOB: 1970    Diagnosis: screening      •  escitalopram 10 MG Oral Tab, Take 1 tablet (10 mg total) by mouth daily. fevers, rigors  EYES:  negative for diplopia   RESPIRATORY:  negative for severe shortness of breath  CARDIOVASCULAR:  negative for crushing sub-sternal chest pain  GASTROINTESTINAL:  see HPI  GENITOURINARY:  negative for dysuria or gross hematuria  INTEGU

## 2020-11-19 NOTE — ANESTHESIA PREPROCEDURE EVALUATION
Anesthesia PreOp Note    HPI:     Lora Zarco is a 48year old female who presents for preoperative consultation requested by: Rishi Barry MD    Date of Surgery: 11/19/2020    Procedure(s):  COLONOSCOPY  Indication: Screening for colon menses, anemia   • LIPOMA REMOVAL Left 2005    Lipoma removal of left flank   • NEEDLE BIOPSY LEFT  05/15/2019    US bx   • NEEDLE BIOPSY RIGHT  05/2017    benign outside breast bx natalia   • OTHER SURGICAL HISTORY  05/01/2017    right breast biopsy Smokeless tobacco: Never Used      Tobacco comment: states 1-2 cigs/day     Substance and Sexual Activity      Alcohol use: Yes        Comment: socially, 1 drink/day      Drug use: No      Sexual activity: Not on file    Lifestyle      Physical activity and Nursing notes reviewed    Airway   Mallampati: II  Neck ROM: full  Dental - normal exam     Comment: 2  bridges    Pulmonary     breath sounds clear to auscultation  (+) sleep apnea,     ROS comment: Mild sleep apnea.  Weight loss has decreased her need

## 2020-11-19 NOTE — OPERATIVE REPORT
COLONOSCOPY REPORT    Sandra MANCUSO 1970 Age 48year old   PCP Rory Agudelo DO Endoscopist Nanci Mcleod MD     Date of procedure: 20    Procedure: Colonoscopy w/ biopsies    Pre-operative diagnosis: screening colonoscop without evidence of angioectasias or inflammation. 6. FARHAN: normal rectal tone, no masses palpated. Impression:   · Await pathology  · Avoid NSAIDs    Recommend:  · Await pathology. · GI staff: please place recall in for colonoscopy in 10 years.  I

## 2020-11-20 VITALS
WEIGHT: 142 LBS | OXYGEN SATURATION: 100 % | SYSTOLIC BLOOD PRESSURE: 141 MMHG | HEIGHT: 61 IN | TEMPERATURE: 98 F | DIASTOLIC BLOOD PRESSURE: 86 MMHG | RESPIRATION RATE: 14 BRPM | HEART RATE: 62 BPM | BODY MASS INDEX: 26.81 KG/M2

## 2020-11-25 ENCOUNTER — TELEPHONE (OUTPATIENT)
Dept: FAMILY MEDICINE CLINIC | Facility: CLINIC | Age: 50
End: 2020-11-25

## 2020-11-25 ENCOUNTER — TELEPHONE (OUTPATIENT)
Dept: GASTROENTEROLOGY | Facility: CLINIC | Age: 50
End: 2020-11-25

## 2020-11-25 NOTE — TELEPHONE ENCOUNTER
Recall entered for repeat Colon in 10 yrs,11/19/2030 per   Colon done in 11/19/2020   updated and patient outreach in placed for Colon recall  Naomi Hernandez MD  [facility] Clinical Staff             GI staff: please place recall in for colonoscopy i

## 2020-11-25 NOTE — TELEPHONE ENCOUNTER
Nomi Shannon with [facility] called and advised that they faxed over a prescription for the DR to sign for CPAP Supplies. They faxed it over back on  11/20 & again 11/24. Please Advise.

## 2020-11-27 DIAGNOSIS — F41.9 ANXIETY: ICD-10-CM

## 2020-11-27 DIAGNOSIS — F32.A DEPRESSIVE DISORDER: ICD-10-CM

## 2020-11-27 RX ORDER — ESCITALOPRAM OXALATE 10 MG/1
10 TABLET ORAL DAILY
Qty: 90 TABLET | Refills: 0 | Status: SHIPPED | OUTPATIENT
Start: 2020-11-27 | End: 2021-02-01

## 2020-11-27 NOTE — TELEPHONE ENCOUNTER
•  escitalopram 10 MG Oral Tab, Take 1 tablet (10 mg total) by mouth daily.  For mood, Disp: 90 tablet, Rfl: 0

## 2020-12-06 ENCOUNTER — HOSPITAL ENCOUNTER (OUTPATIENT)
Dept: MAMMOGRAPHY | Facility: HOSPITAL | Age: 50
Discharge: HOME | End: 2020-12-06
Attending: FAMILY MEDICINE
Payer: MEDICAID

## 2020-12-06 DIAGNOSIS — Z12.31 VISIT FOR SCREENING MAMMOGRAM: ICD-10-CM

## 2020-12-06 PROCEDURE — 77067 SCR MAMMO BI INCL CAD: CPT | Performed by: FAMILY MEDICINE

## 2020-12-06 PROCEDURE — 77063 BREAST TOMOSYNTHESIS BI: CPT | Performed by: FAMILY MEDICINE

## 2020-12-21 ENCOUNTER — OFFICE VISIT (OUTPATIENT)
Dept: FAMILY MEDICINE CLINIC | Facility: CLINIC | Age: 50
End: 2020-12-21
Payer: MEDICAID

## 2020-12-21 VITALS
DIASTOLIC BLOOD PRESSURE: 87 MMHG | SYSTOLIC BLOOD PRESSURE: 135 MMHG | BODY MASS INDEX: 27.94 KG/M2 | HEART RATE: 66 BPM | HEIGHT: 61 IN | WEIGHT: 148 LBS

## 2020-12-21 DIAGNOSIS — M19.042 PRIMARY OSTEOARTHRITIS OF BOTH HANDS: Primary | ICD-10-CM

## 2020-12-21 DIAGNOSIS — M21.619 BUNION OF GREAT TOE: ICD-10-CM

## 2020-12-21 DIAGNOSIS — M19.041 PRIMARY OSTEOARTHRITIS OF BOTH HANDS: Primary | ICD-10-CM

## 2020-12-21 PROCEDURE — 3008F BODY MASS INDEX DOCD: CPT | Performed by: FAMILY MEDICINE

## 2020-12-21 PROCEDURE — 99213 OFFICE O/P EST LOW 20 MIN: CPT | Performed by: FAMILY MEDICINE

## 2020-12-21 PROCEDURE — 3079F DIAST BP 80-89 MM HG: CPT | Performed by: FAMILY MEDICINE

## 2020-12-21 PROCEDURE — 3075F SYST BP GE 130 - 139MM HG: CPT | Performed by: FAMILY MEDICINE

## 2020-12-21 RX ORDER — NAPROXEN 500 MG/1
500 TABLET ORAL 2 TIMES DAILY WITH MEALS
Qty: 60 TABLET | Refills: 1 | Status: SHIPPED | OUTPATIENT
Start: 2020-12-21 | End: 2021-03-10

## 2020-12-21 NOTE — PROGRESS NOTES
12/21/2020  8:88 PM    Patricia Rojas Darlene Castillo is a 48year old female.     Chief complaint(s): Patient presents with:  Finger Pain: c/o joint pain on her hands and feet     HPI:     [facility] primary complaint is regarding arthr 02/02/2015 11/13/2015      TDAP                  02/13/2012 04/30/2018      Medications (Active prior to today's visit):  Current Outpatient Medications   Medication Sig Dispense Refill   • naproxen 500 MG Oral Tab Take 1 tablet (500 mg total) by mouth 2 include: Patient was reassured of  her medical condition and all questions and concerns were answered. Patient was informed to please, call our office with any new or further questions or concerns that may come up in the near future.  Notify Dr Adilia Lane or

## 2021-02-01 DIAGNOSIS — F32.A DEPRESSIVE DISORDER: ICD-10-CM

## 2021-02-01 DIAGNOSIS — F41.9 ANXIETY: ICD-10-CM

## 2021-02-01 RX ORDER — ESCITALOPRAM OXALATE 10 MG/1
10 TABLET ORAL DAILY
Qty: 90 TABLET | Refills: 0 | Status: SHIPPED | OUTPATIENT
Start: 2021-02-01 | End: 2021-04-22

## 2021-02-02 NOTE — TELEPHONE ENCOUNTER
TextHub message sent to patient, please verify it is viewed. If not, please call patient with the update.

## 2021-02-18 ENCOUNTER — HOSPITAL ENCOUNTER (OUTPATIENT)
Dept: GENERAL RADIOLOGY | Age: 51
Discharge: HOME | End: 2021-02-18
Attending: FAMILY MEDICINE
Payer: MEDICAID

## 2021-02-18 DIAGNOSIS — G89.29 CHRONIC THUMB PAIN, BILATERAL: ICD-10-CM

## 2021-02-18 DIAGNOSIS — M79.644 CHRONIC THUMB PAIN, BILATERAL: ICD-10-CM

## 2021-02-18 DIAGNOSIS — M79.645 CHRONIC THUMB PAIN, BILATERAL: ICD-10-CM

## 2021-02-18 DIAGNOSIS — M18.10 ARTHRITIS OF CARPOMETACARPAL (CMC) JOINT OF THUMB: ICD-10-CM

## 2021-02-18 PROCEDURE — 73130 X-RAY EXAM OF HAND: CPT | Performed by: FAMILY MEDICINE

## 2021-02-18 NOTE — PROGRESS NOTES
Patient ID: Urbano Monroy is a 48year old female. HPI  Patient presents with:  Depression: f/u  Weight Check    Last seen by me on 9/10/2020. Pt reports she has not been smoking for two weeks.     Pt reports her mood is well-managed oz      BMI Readings from Last 6 Encounters:  02/18/21 : 28.68 kg/m²  12/21/20 : 27.96 kg/m²  11/19/20 : 26.83 kg/m²  10/06/20 : 28.08 kg/m²  09/28/20 : 27.73 kg/m²  09/10/20 : 28.61 kg/m²      BP Readings from Last 6 Encounters:  02/18/21 : 128/83  12/21/ is oriented to person, place, and time. Patient appears well-developed and well-nourished. No distress. Head: Normocephalic. Eyes: Conjunctivae and EOM are normal.   Neck: Normal range of motion. No thyromegaly present.    Cardiovascular: Normal rate, r applicable)  Orders Placed This Encounter      Occupational Therapy Referral - [facility]          Order Comments:              Evaluate and treat and use modalities as you feel needed. Call [phone] to make appointment.           R

## 2021-03-10 RX ORDER — NAPROXEN 500 MG/1
TABLET ORAL
Qty: 60 TABLET | Refills: 1 | Status: SHIPPED | OUTPATIENT
Start: 2021-03-10 | End: 2021-04-22

## 2021-03-19 ENCOUNTER — OFFICE VISIT (OUTPATIENT)
Dept: OCCUPATIONAL MEDICINE | Facility: HOSPITAL | Age: 51
End: 2021-03-19
Attending: FAMILY MEDICINE
Payer: MEDICAID

## 2021-03-19 DIAGNOSIS — G89.29 CHRONIC THUMB PAIN, BILATERAL: ICD-10-CM

## 2021-03-19 DIAGNOSIS — M79.644 CHRONIC THUMB PAIN, BILATERAL: ICD-10-CM

## 2021-03-19 DIAGNOSIS — M18.10 ARTHRITIS OF CARPOMETACARPAL (CMC) JOINT OF THUMB: ICD-10-CM

## 2021-03-19 DIAGNOSIS — M79.645 CHRONIC THUMB PAIN, BILATERAL: ICD-10-CM

## 2021-03-19 PROCEDURE — 97140 MANUAL THERAPY 1/> REGIONS: CPT | Performed by: OCCUPATIONAL THERAPIST

## 2021-03-19 PROCEDURE — 97165 OT EVAL LOW COMPLEX 30 MIN: CPT | Performed by: OCCUPATIONAL THERAPIST

## 2021-03-19 NOTE — PROGRESS NOTES
OCCUPATIONAL THERAPY UPPER EXTREMITY EVALUATION:   Referring Physician: Dr. Horacio Bustillo  Date of onset: 2018  Diagnosis: Chronic thumb pain, bilateral (U27.561,Y18.06)  Arthritis of carpometacarpal (CMC) joint of thumb (M18.10) Date of Service: 03/19/21     PA Madonna Bell could benefit from continued skilled OT to address the subcomponents of pain management and strength to allow her the ability to regain above- noted skills. Based on clinical rationale this evaluation involved low complexity decision making due to 2 per Neuromuscular Re-education                                                             Modalities                    moist heat  3 mn                  ultrasound cont.  3.0 MHZ, 0.9 w/cm2  6 min over right thumb CMC                         Consuelo services furnished under this plan of treatment and while under my care.         X______________________________________________ Date________________  Certification From: 29/64/27      To: 05/19/21

## 2021-03-24 ENCOUNTER — OFFICE VISIT (OUTPATIENT)
Dept: OCCUPATIONAL MEDICINE | Facility: HOSPITAL | Age: 51
End: 2021-03-24
Attending: FAMILY MEDICINE
Payer: MEDICAID

## 2021-03-24 PROCEDURE — 97760 ORTHOTIC MGMT&TRAING 1ST ENC: CPT | Performed by: OCCUPATIONAL THERAPIST

## 2021-03-24 PROCEDURE — 97110 THERAPEUTIC EXERCISES: CPT | Performed by: OCCUPATIONAL THERAPIST

## 2021-03-24 PROCEDURE — 97140 MANUAL THERAPY 1/> REGIONS: CPT | Performed by: OCCUPATIONAL THERAPIST

## 2021-03-24 PROCEDURE — 97018 PARAFFIN BATH THERAPY: CPT | Performed by: OCCUPATIONAL THERAPIST

## 2021-03-24 NOTE — PROGRESS NOTES
Dx:     Chronic thumb pain, bilateral (R33.117,E55.12)  Arthritis of carpometacarpal (CMC) joint of thumb (M18.10     Authorized # of Visits:  6 (BC comm)   Next MD visit: none scheduled  Fall Risk: standard         Precautions: n/a           Medication strength to at least 30 lbs in right and 25 in left for ease in opening containers. Patient will demonstrate increase in right lateral pinch to at least 9 lbs for ease in opening package.   Patient will demonstrate independence with don/doff thumb CMC orth

## 2021-03-26 ENCOUNTER — OFFICE VISIT (OUTPATIENT)
Dept: OCCUPATIONAL MEDICINE | Facility: HOSPITAL | Age: 51
End: 2021-03-26
Attending: FAMILY MEDICINE
Payer: MEDICAID

## 2021-03-26 PROCEDURE — 97110 THERAPEUTIC EXERCISES: CPT | Performed by: OCCUPATIONAL THERAPIST

## 2021-03-26 PROCEDURE — 97018 PARAFFIN BATH THERAPY: CPT | Performed by: OCCUPATIONAL THERAPIST

## 2021-03-26 PROCEDURE — 97140 MANUAL THERAPY 1/> REGIONS: CPT | Performed by: OCCUPATIONAL THERAPIST

## 2021-03-26 PROCEDURE — 97035 APP MDLTY 1+ULTRASOUND EA 15: CPT | Performed by: OCCUPATIONAL THERAPIST

## 2021-03-26 NOTE — PROGRESS NOTES
Dx:     Chronic thumb pain, bilateral (Y33.577,A46.57)  Arthritis of carpometacarpal (CMC) joint of thumb (M18.10     Authorized # of Visits:  6 (BC comm)   Next MD visit: none scheduled  Fall Risk: standard         Precautions: n/a           Medication in both thumb CMC will decrease at worst to 1/10. Pt will be independent and compliant with comprehensive HEP to maintain progress achieved in OT.   Patient will demonstrate increase in bilateral  strength to at least 30 lbs in right and 25 in left for

## 2021-03-30 ENCOUNTER — OFFICE VISIT (OUTPATIENT)
Dept: OCCUPATIONAL MEDICINE | Facility: HOSPITAL | Age: 51
End: 2021-03-30
Attending: FAMILY MEDICINE
Payer: MEDICAID

## 2021-03-30 PROCEDURE — 97760 ORTHOTIC MGMT&TRAING 1ST ENC: CPT | Performed by: OCCUPATIONAL THERAPIST

## 2021-03-30 PROCEDURE — 97140 MANUAL THERAPY 1/> REGIONS: CPT | Performed by: OCCUPATIONAL THERAPIST

## 2021-03-30 PROCEDURE — 97018 PARAFFIN BATH THERAPY: CPT | Performed by: OCCUPATIONAL THERAPIST

## 2021-03-30 PROCEDURE — 97110 THERAPEUTIC EXERCISES: CPT | Performed by: OCCUPATIONAL THERAPIST

## 2021-03-30 NOTE — PROGRESS NOTES
Dx:     Chronic thumb pain, bilateral (X49.271,I55.65)  Arthritis of carpometacarpal (CMC) joint of thumb (M18.10     Authorized # of Visits:  6 (BC comm)   Next MD visit: none scheduled  Fall Risk: standard         Precautions: n/a           Medication Assessment: Patient having more pain in left thumb CMC, fabricate left thumb CMC orthosis. Patient instructed on wear and care, independent with don/doff. Goals:   Pt complaints of pain in both thumb CMC will decrease at worst to 1/10.   Pt will b

## 2021-04-01 ENCOUNTER — OFFICE VISIT (OUTPATIENT)
Dept: OCCUPATIONAL MEDICINE | Facility: HOSPITAL | Age: 51
End: 2021-04-01
Attending: FAMILY MEDICINE
Payer: MEDICAID

## 2021-04-01 PROCEDURE — 97140 MANUAL THERAPY 1/> REGIONS: CPT | Performed by: OCCUPATIONAL THERAPIST

## 2021-04-01 PROCEDURE — 97110 THERAPEUTIC EXERCISES: CPT | Performed by: OCCUPATIONAL THERAPIST

## 2021-04-01 PROCEDURE — 97035 APP MDLTY 1+ULTRASOUND EA 15: CPT | Performed by: OCCUPATIONAL THERAPIST

## 2021-04-01 PROCEDURE — 97018 PARAFFIN BATH THERAPY: CPT | Performed by: OCCUPATIONAL THERAPIST

## 2021-04-01 NOTE — PROGRESS NOTES
Dx:     Chronic thumb pain, bilateral (K02.656,O91.66)  Arthritis of carpometacarpal (CMC) joint of thumb (M18.10     Authorized # of Visits:  6 (BC comm)   Next MD visit: none scheduled  Fall Risk: standard         Precautions: n/a           Medication paraffin bilateral 5 min          ultrasound cont.  3.0 MHZ, 0.9 w/cm2  6 min over right thumb CMC    8 min over right thumb CMC    4 min over each thumb CMC             Assessment: Upgraded gripping and pinching today, able to complete all, reporting minim

## 2021-04-06 ENCOUNTER — OFFICE VISIT (OUTPATIENT)
Dept: OCCUPATIONAL MEDICINE | Facility: HOSPITAL | Age: 51
End: 2021-04-06
Attending: FAMILY MEDICINE
Payer: MEDICAID

## 2021-04-06 PROCEDURE — 97110 THERAPEUTIC EXERCISES: CPT | Performed by: OCCUPATIONAL THERAPIST

## 2021-04-06 PROCEDURE — 97140 MANUAL THERAPY 1/> REGIONS: CPT | Performed by: OCCUPATIONAL THERAPIST

## 2021-04-06 PROCEDURE — 97018 PARAFFIN BATH THERAPY: CPT | Performed by: OCCUPATIONAL THERAPIST

## 2021-04-06 NOTE — PROGRESS NOTES
Dx:     Chronic thumb pain, bilateral (B29.065,L04.77)  Arthritis of carpometacarpal (CMC) joint of thumb (M18.10     Authorized # of Visits:  6 (BC comm)   Next MD visit: none scheduled  Fall Risk: standard         Precautions: n/a           Medication Modalities                    moist heat  3 mn  paraffin bilateral 5 min  paraffin bilateral 5 min  paraffin bilateral 5 min  paraffin bilateral 5 min  paraffin bilateral 5 min        ultrasound cont.  3. OT. Jaskaran Mendoza (Acheived)  Patient will demonstrate increase in bilateral  strength to at least 30 lbs in right and 25 in left for ease in opening containers. Jaskaran Mendoza .(not Achieved)  Patient will demonstrate increase in right lateral pinch to at least 9 lbs for ease i

## 2021-04-07 ENCOUNTER — TELEPHONE (OUTPATIENT)
Dept: PHYSICAL THERAPY | Facility: HOSPITAL | Age: 51
End: 2021-04-07

## 2021-04-08 ENCOUNTER — APPOINTMENT (OUTPATIENT)
Dept: OCCUPATIONAL MEDICINE | Facility: HOSPITAL | Age: 51
End: 2021-04-08
Attending: FAMILY MEDICINE
Payer: MEDICAID

## 2021-04-13 ENCOUNTER — APPOINTMENT (OUTPATIENT)
Dept: OCCUPATIONAL MEDICINE | Facility: HOSPITAL | Age: 51
End: 2021-04-13
Attending: FAMILY MEDICINE
Payer: MEDICAID

## 2021-04-22 ENCOUNTER — OFFICE VISIT (OUTPATIENT)
Dept: FAMILY MEDICINE CLINIC | Facility: CLINIC | Age: 51
End: 2021-04-22
Payer: MEDICAID

## 2021-04-22 VITALS
HEIGHT: 61 IN | SYSTOLIC BLOOD PRESSURE: 120 MMHG | BODY MASS INDEX: 29.07 KG/M2 | HEART RATE: 71 BPM | DIASTOLIC BLOOD PRESSURE: 84 MMHG | WEIGHT: 154 LBS

## 2021-04-22 DIAGNOSIS — M25.562 CHRONIC PAIN OF LEFT KNEE: ICD-10-CM

## 2021-04-22 DIAGNOSIS — M79.644 CHRONIC THUMB PAIN, BILATERAL: Primary | ICD-10-CM

## 2021-04-22 DIAGNOSIS — F41.9 ANXIETY: ICD-10-CM

## 2021-04-22 DIAGNOSIS — M79.645 CHRONIC THUMB PAIN, BILATERAL: Primary | ICD-10-CM

## 2021-04-22 DIAGNOSIS — E66.09 NON MORBID OBESITY DUE TO EXCESS CALORIES: ICD-10-CM

## 2021-04-22 DIAGNOSIS — M17.12 PRIMARY OSTEOARTHRITIS OF LEFT KNEE: ICD-10-CM

## 2021-04-22 DIAGNOSIS — G89.29 CHRONIC PAIN OF LEFT KNEE: ICD-10-CM

## 2021-04-22 DIAGNOSIS — G89.29 CHRONIC THUMB PAIN, BILATERAL: Primary | ICD-10-CM

## 2021-04-22 DIAGNOSIS — F32.A DEPRESSIVE DISORDER: ICD-10-CM

## 2021-04-22 PROCEDURE — 99215 OFFICE O/P EST HI 40 MIN: CPT | Performed by: FAMILY MEDICINE

## 2021-04-22 PROCEDURE — 3079F DIAST BP 80-89 MM HG: CPT | Performed by: FAMILY MEDICINE

## 2021-04-22 PROCEDURE — 3008F BODY MASS INDEX DOCD: CPT | Performed by: FAMILY MEDICINE

## 2021-04-22 PROCEDURE — 3074F SYST BP LT 130 MM HG: CPT | Performed by: FAMILY MEDICINE

## 2021-04-22 RX ORDER — NAPROXEN 500 MG/1
500 TABLET ORAL 2 TIMES DAILY WITH MEALS
Qty: 60 TABLET | Refills: 1 | Status: SHIPPED | OUTPATIENT
Start: 2021-04-22

## 2021-04-22 RX ORDER — ESCITALOPRAM OXALATE 10 MG/1
10 TABLET ORAL DAILY
Qty: 90 TABLET | Refills: 0 | Status: SHIPPED | OUTPATIENT
Start: 2021-04-22 | End: 2021-07-27

## 2021-04-22 RX ORDER — PHENTERMINE HYDROCHLORIDE 30 MG/1
CAPSULE ORAL
Qty: 30 CAPSULE | Refills: 2 | Status: SHIPPED | OUTPATIENT
Start: 2021-04-22 | End: 2021-07-27

## 2021-04-22 NOTE — PROGRESS NOTES
Patient ID: Amanda Kingston is a 46year old female. HPI  Patient presents with:  Blood Pressure: f/u  Weight Check    Last seen by me on 2/18/2021; I reviewed the note.   She is back to see if the blood pressure is better and whether or Musculoskeletal: Positive for arthralgias and joint swelling.            Medical History:      Past Medical History:   Diagnosis Date   • Anxiety state    • Depression    • Obesity        Past Surgical History:   Procedure Laterality Date   • COLONOSCOPY of the thumbs.   Left knee: Valgus deformity                                          KNEE EXAM    KNEE EXAM: LEFT    Range of Motion EXT LAG = 0   FLEX = 150 (Degrees)       Effusion Mild   Swelling None   Erythema None   Atrophy None       Strength bothering her. She does have a mild effusion. Start naproxen and physical therapy. If it still bothers her in 1 month we may do a steroid injection into the knee. Chronic pain of left knee  -     naproxen 500 MG Oral Tab;  Take 1 tablet (500 mg total) b

## 2021-05-18 ENCOUNTER — LAB ENCOUNTER (OUTPATIENT)
Dept: LAB | Facility: HOSPITAL | Age: 51
End: 2021-05-18
Attending: NURSE PRACTITIONER
Payer: MEDICAID

## 2021-05-18 ENCOUNTER — VIRTUAL PHONE E/M (OUTPATIENT)
Dept: FAMILY MEDICINE CLINIC | Facility: CLINIC | Age: 51
End: 2021-05-18
Payer: MEDICAID

## 2021-05-18 DIAGNOSIS — R06.02 SHORTNESS OF BREATH: ICD-10-CM

## 2021-05-18 DIAGNOSIS — R05.9 COUGH: ICD-10-CM

## 2021-05-18 DIAGNOSIS — R05.9 COUGH: Primary | ICD-10-CM

## 2021-05-18 PROCEDURE — 99213 OFFICE O/P EST LOW 20 MIN: CPT | Performed by: NURSE PRACTITIONER

## 2021-05-18 PROCEDURE — 87081 CULTURE SCREEN ONLY: CPT

## 2021-05-18 NOTE — PROGRESS NOTES
HPI  Pt has developed dry cough,throat feels scratchy and feels dry. Yesterday had body aches-took advil once yesterday. Feeling better today. No fever, headache, chest pain or sore throat. Shortness of breath w activity.      Had covid vaccine-last o COLONOSCOPY;  Surgeon: Nils Smith MD;  Location: [facility] ENDOSCOPY   • Hysterectomy  2009    LORNA for heavy menses, anemia   • Lipoma removal Left 2005    Lipoma removal of left flank   • Needle biopsy left  05/15/2019    US bx   • Needle biopsy right  05/20 Services:       Active Member of Clubs or Organizations:       Attends Club or Organization Meetings:       Marital Status:   Intimate Partner Violence:       Fear of Current or Ex-Partner:       Emotionally Abused:       Physically Abused:       Sexually office visits are ongoing. Every conscious effort was taken to allow for sufficient and adequate time to complete the visit.   The patient was made aware of the limitations of the telehealth visit, including treatment limitations as no physical exam could b

## 2021-05-20 ENCOUNTER — TELEPHONE (OUTPATIENT)
Dept: FAMILY MEDICINE CLINIC | Facility: CLINIC | Age: 51
End: 2021-05-20

## 2021-05-20 DIAGNOSIS — Z20.822 ENCOUNTER BY TELEHEALTH FOR SUSPECTED COVID-19: Primary | ICD-10-CM

## 2021-05-20 RX ORDER — BENZONATATE 200 MG/1
200 CAPSULE ORAL 3 TIMES DAILY PRN
Qty: 30 CAPSULE | Refills: 0 | Status: SHIPPED | OUTPATIENT
Start: 2021-05-20 | End: 2021-07-27

## 2021-05-20 RX ORDER — PREDNISONE 20 MG/1
TABLET ORAL
Qty: 10 TABLET | Refills: 0 | Status: SHIPPED | OUTPATIENT
Start: 2021-05-20 | End: 2021-07-27

## 2021-05-20 RX ORDER — GUAIFENESIN AND CODEINE PHOSPHATE 100; 10 MG/5ML; MG/5ML
SOLUTION ORAL
Qty: 118 ML | Refills: 0 | Status: SHIPPED | OUTPATIENT
Start: 2021-05-20 | End: 2021-07-27

## 2021-05-20 NOTE — TELEPHONE ENCOUNTER
Patient calling with condition update. She is still feeling the same. She was seen on 5/18/21 via virtual visit for cough and shortness of breath. Patient reports still having a dry cough and shortness with exertion.  She tried Delsum last night and thi

## 2021-05-20 NOTE — TELEPHONE ENCOUNTER
Spoke with patient ( verified) and relayed ISAIAS Estrada's message below--patient verbalizes understanding and agreement. No further questions/concerns at this time.

## 2021-05-20 NOTE — TELEPHONE ENCOUNTER
I have sent in some prednisone and cough medication. Please call if symptoms worsen or are not resolving. The cough syrup will cause drowsiness.

## 2021-07-20 ENCOUNTER — OFFICE VISIT (OUTPATIENT)
Dept: OPTOMETRY | Facility: CLINIC | Age: 51
End: 2021-07-20
Payer: MEDICAID

## 2021-07-20 DIAGNOSIS — H25.13 NUCLEAR SCLEROSIS OF BOTH EYES: Primary | ICD-10-CM

## 2021-07-20 DIAGNOSIS — H52.203 HYPEROPIA WITH ASTIGMATISM AND PRESBYOPIA, BILATERAL: ICD-10-CM

## 2021-07-20 DIAGNOSIS — H52.03 HYPEROPIA WITH ASTIGMATISM AND PRESBYOPIA, BILATERAL: ICD-10-CM

## 2021-07-20 DIAGNOSIS — H52.4 HYPEROPIA WITH ASTIGMATISM AND PRESBYOPIA, BILATERAL: ICD-10-CM

## 2021-07-20 PROCEDURE — 92015 DETERMINE REFRACTIVE STATE: CPT | Performed by: OPTOMETRIST

## 2021-07-20 PROCEDURE — 92014 COMPRE OPH EXAM EST PT 1/>: CPT | Performed by: OPTOMETRIST

## 2021-07-20 NOTE — PATIENT INSTRUCTIONS
Nuclear sclerosis of both eyes  No treatment is required. Will continue to observe. Hyperopia with astigmatism and presbyopia, bilateral  New glasses RX given to update as needed.

## 2021-07-20 NOTE — PROGRESS NOTES
Alice Khan is a 46year old female. HPI:     HPI     Patient is in for an annual eye exam. Has progressives but does not like to walk around with them and uses only for  reading and PC. Patient has no other ocular complaints.     Last benzonatate 200 MG Oral Cap Take 1 capsule (200 mg total) by mouth 3 (three) times daily as needed for cough.  (Patient not taking: Reported on 7/20/2021 ) 30 capsule 0   • guaiFENesin-Codeine 100-10 MG/5ML Oral Syrup Take 1 tsp every 6 hours for cough as n Exam       Right Left    Disc Normal Normal    C/D Ratio 0.3 0.3    Macula Normal Normal    Vessels Normal Normal    Periphery Normal Normal            Refraction     Wearing Rx       Sphere Cylinder Axis Add    Right +0.50 -2.25 030 +1.75    Left +0.75 -1

## 2021-07-27 NOTE — PROGRESS NOTES
Patient ID: Ramon Morgan is a 46year old female. HPI  Patient presents with: Anxiety: f/u    Last seen by me on 4/22/2021. Pt is taking escitalopram 10 MG and states her mood is well-managed.  Pt's aunt, who was like a mother to t Diagnosis Date   • Anxiety state    • Depression    • Obesity        Past Surgical History:   Procedure Laterality Date   • COLONOSCOPY N/A 11/19/2020    Procedure: COLONOSCOPY;  Surgeon: Angelito Robledo MD;  Location: [address] [place] [address] ENDOSCOPY   • HYSTERECTOMY  2009 by mouth daily. For mood  Agree with her restarting the Lexapro and I would continue it. Non morbid obesity due to excess calories  -     Phentermine HCl 15 MG Oral Cap; Take 1 capsule (15 mg total) by mouth every morning.  For weight loss  Agree with her

## 2021-08-19 ENCOUNTER — NURSE TRIAGE (OUTPATIENT)
Dept: FAMILY MEDICINE CLINIC | Facility: CLINIC | Age: 51
End: 2021-08-19

## 2021-08-19 NOTE — TELEPHONE ENCOUNTER
Action Requested: Summary for Provider     []  Critical Lab, Recommendations Needed  [] Need Additional Advice  []   FYI    []   Need Orders  [] Need Medications Sent to Pharmacy  []  Other     SUMMARY: Per protocol advised office visit.   Scheduled tomorro

## 2021-08-20 ENCOUNTER — OFFICE VISIT (OUTPATIENT)
Dept: FAMILY MEDICINE CLINIC | Facility: CLINIC | Age: 51
End: 2021-08-20
Payer: MEDICAID

## 2021-08-20 ENCOUNTER — HOSPITAL ENCOUNTER (OUTPATIENT)
Dept: GENERAL RADIOLOGY | Age: 51
Discharge: HOME | End: 2021-08-20
Attending: NURSE PRACTITIONER
Payer: MEDICAID

## 2021-08-20 VITALS
BODY MASS INDEX: 29.07 KG/M2 | HEIGHT: 61 IN | DIASTOLIC BLOOD PRESSURE: 84 MMHG | WEIGHT: 154 LBS | HEART RATE: 71 BPM | SYSTOLIC BLOOD PRESSURE: 124 MMHG

## 2021-08-20 DIAGNOSIS — M53.3 PAIN IN THE COCCYX: ICD-10-CM

## 2021-08-20 DIAGNOSIS — M53.3 PAIN IN THE COCCYX: Primary | ICD-10-CM

## 2021-08-20 PROCEDURE — 3079F DIAST BP 80-89 MM HG: CPT | Performed by: NURSE PRACTITIONER

## 2021-08-20 PROCEDURE — 3074F SYST BP LT 130 MM HG: CPT | Performed by: NURSE PRACTITIONER

## 2021-08-20 PROCEDURE — 99213 OFFICE O/P EST LOW 20 MIN: CPT | Performed by: NURSE PRACTITIONER

## 2021-08-20 PROCEDURE — 3008F BODY MASS INDEX DOCD: CPT | Performed by: NURSE PRACTITIONER

## 2021-08-20 PROCEDURE — 72220 X-RAY EXAM SACRUM TAILBONE: CPT | Performed by: NURSE PRACTITIONER

## 2021-08-20 NOTE — PROGRESS NOTES
HPI  Pt presents for fall in bathroom 3 days ago-fell on coccyx. Took naproxen that day. Did not fell bad on Wed but felt more pain yesterday. Hurts to sit down. Can't lay on back. Review of Systems   Constitutional: Positive for activity change. History      Not on file    Tobacco Use      Smoking status: Former Smoker        Types: Cigarettes      Smokeless tobacco: Never Used    Vaping Use      Vaping Use: Never used    Substance and Sexual Activity      Alcohol use: Yes        Comment: socially Appearance: Normal appearance. Cardiovascular:      Rate and Rhythm: Normal rate. Pulmonary:      Effort: Pulmonary effort is normal. No respiratory distress. Musculoskeletal:         General: Tenderness present.       Lumbar back: Bony tenderness pre

## 2021-08-20 NOTE — PATIENT INSTRUCTIONS
Coccyx or Sacrum Bruise (Contusion)    You have a bruise (contusion of the tailbone (coccyx) or sacrum. The sacrum is the triangular bone at the base of the spine that joins the pelvic bones.  The coccyx is the last bone of the sacrum that hangs down in a swelling gets worse  · Pain spreads to one or both legs  · Weakness or numbness in one or both legs  · Loss of bowel or bladder control  · Numbness in the groin area  · Signs of infection. These include warmth, drainage, or increased redness.   · Frequent b de frío (compresa de hielo o cubos de hielo en yuriy bolsa plástica) en yuriy toalla delgada. Aplique sobre la andrés con el hematoma hazel 20 minutos cada yuriy o dos horas el primer día.  Continúe esta práctica 3 a 4 veces al día hasta que desaparezcan el dolor

## 2021-08-21 NOTE — ASSESSMENT & PLAN NOTE
Coccyx xray  Ice 20 min 4-6 times per day  Ibuprofen 600 mg tid to qid prn  Please call if symptoms worsen or are not resolving.

## 2021-09-24 ENCOUNTER — TELEPHONE (OUTPATIENT)
Dept: FAMILY MEDICINE CLINIC | Facility: CLINIC | Age: 51
End: 2021-09-24

## 2021-09-24 ENCOUNTER — OFFICE VISIT (OUTPATIENT)
Dept: FAMILY MEDICINE CLINIC | Facility: CLINIC | Age: 51
End: 2021-09-24
Payer: MEDICAID

## 2021-09-24 VITALS
HEIGHT: 61 IN | TEMPERATURE: 98 F | DIASTOLIC BLOOD PRESSURE: 76 MMHG | BODY MASS INDEX: 28.92 KG/M2 | HEART RATE: 78 BPM | WEIGHT: 153.19 LBS | SYSTOLIC BLOOD PRESSURE: 118 MMHG

## 2021-09-24 DIAGNOSIS — F41.9 ANXIETY: ICD-10-CM

## 2021-09-24 DIAGNOSIS — H81.10 BENIGN PAROXYSMAL POSITIONAL VERTIGO, UNSPECIFIED LATERALITY: Primary | ICD-10-CM

## 2021-09-24 DIAGNOSIS — E66.09 NON MORBID OBESITY DUE TO EXCESS CALORIES: ICD-10-CM

## 2021-09-24 DIAGNOSIS — L30.9 ECZEMA OF BOTH HANDS: ICD-10-CM

## 2021-09-24 PROCEDURE — 3008F BODY MASS INDEX DOCD: CPT | Performed by: FAMILY MEDICINE

## 2021-09-24 PROCEDURE — 99214 OFFICE O/P EST MOD 30 MIN: CPT | Performed by: FAMILY MEDICINE

## 2021-09-24 PROCEDURE — 3074F SYST BP LT 130 MM HG: CPT | Performed by: FAMILY MEDICINE

## 2021-09-24 PROCEDURE — 3078F DIAST BP <80 MM HG: CPT | Performed by: FAMILY MEDICINE

## 2021-09-24 RX ORDER — MECLIZINE HCL 12.5 MG/1
12.5 TABLET ORAL NIGHTLY PRN
Qty: 30 TABLET | Refills: 0 | Status: SHIPPED | OUTPATIENT
Start: 2021-09-24 | End: 2021-10-24

## 2021-09-24 RX ORDER — PHENTERMINE HYDROCHLORIDE 15 MG/1
15 CAPSULE ORAL EVERY MORNING
Qty: 90 CAPSULE | Refills: 0 | Status: SHIPPED | OUTPATIENT
Start: 2021-09-24

## 2021-09-24 RX ORDER — BETAMETHASONE DIPROPIONATE 0.5 MG/G
1 OINTMENT TOPICAL 2 TIMES DAILY PRN
Qty: 15 G | Refills: 0 | Status: SHIPPED | OUTPATIENT
Start: 2021-09-24 | End: 2021-09-28

## 2021-09-24 NOTE — PROGRESS NOTES
Patient ID: Evens Kelsey is a 46year old female. HPI  Patient presents with:  Vertigo: f/u  Anxiety: f/u    Last seen by me on 7/27/2021. Pt also c/o vertigo x9/21/2021.  Pt woke up 4 am to help her  with his lunch and went skin on fingers. Neurological: Positive for dizziness. Psychiatric/Behavioral: Negative for dysphoric mood. The patient is nervous/anxious.             Medical History:      Past Medical History:   Diagnosis Date   • Anxiety state    • Depression    • O Patient is alert and oriented to person, place, and time. Finger to nose normal. Negative romberg's. Skin: Skin is warm. Dry cracked skin on right fourth finger and left fifth finger and left middle finger. Psychiatry: Normal mood and affect.     Vitals described in this documentation. All medical record entries made by the scribe were at my direction and in my presence.   I have reviewed the chart and discharge instructions (if applicable) and agree that the record reflects my personal performance and is

## 2021-09-27 NOTE — TELEPHONE ENCOUNTER
Prior authorization for Betamethasone Dipropionate completed w/ [employer] on CoverMyMeds Key: [id number], turn around time 1-5 days.

## 2021-09-28 RX ORDER — HALOBETASOL PROPIONATE 0.05 %
1 OINTMENT (GRAM) TOPICAL 2 TIMES DAILY
Qty: 15 G | Refills: 0 | Status: SHIPPED | OUTPATIENT
Start: 2021-09-28 | End: 2021-12-17

## 2021-09-28 NOTE — TELEPHONE ENCOUNTER
Prior authorization has been denied for Betamethasone Dipropionate Aug 0.05 % External Ointment. Patients plan states medication is not covered due to not meeting criteria.     Patient must have tried five preferred drugs:  -Alclometasone 0.05% cream, ointment  -Betamethasone Valerate 0.1 cream, lotion, ointment  -Clobetasol emollient 0.05 % cream  -Fluocinolone 0.01 % cream, solution, body oil, scalp oil  -Fluocinolone 0.025% cream, ointment  -Fluocinolone 0.05% cream, gel, ointment, solution  -Fluocinolone 0.1% cream   -Fluocinolone emollient 0.05 % cream  -Fluticasone 0.05 cream  -Fluticasone 0.005 % ointment  -Halobetasol 0.05 % cream, ointment  -Hydrocortisone 2.5 % cream  -Hydrocortisone valerate 0.2% cream, ointment  -Mometasone 0.1 % cream, ointment, solution

## 2021-11-05 ENCOUNTER — OFFICE VISIT (OUTPATIENT)
Dept: FAMILY MEDICINE CLINIC | Facility: CLINIC | Age: 51
End: 2021-11-05
Payer: MEDICAID

## 2021-11-05 VITALS
HEART RATE: 76 BPM | DIASTOLIC BLOOD PRESSURE: 86 MMHG | HEIGHT: 61 IN | BODY MASS INDEX: 28.89 KG/M2 | SYSTOLIC BLOOD PRESSURE: 125 MMHG | TEMPERATURE: 97 F | WEIGHT: 153 LBS

## 2021-11-05 DIAGNOSIS — F41.9 ANXIETY: ICD-10-CM

## 2021-11-05 DIAGNOSIS — Z12.31 VISIT FOR SCREENING MAMMOGRAM: ICD-10-CM

## 2021-11-05 DIAGNOSIS — M21.062 ACQUIRED GENU VALGUM OF LEFT KNEE: ICD-10-CM

## 2021-11-05 DIAGNOSIS — R79.89 ELEVATED TSH: ICD-10-CM

## 2021-11-05 DIAGNOSIS — F32.A DEPRESSIVE DISORDER: ICD-10-CM

## 2021-11-05 DIAGNOSIS — Z00.00 ADULT GENERAL MEDICAL EXAM: Primary | ICD-10-CM

## 2021-11-05 DIAGNOSIS — E66.09 NON MORBID OBESITY DUE TO EXCESS CALORIES: ICD-10-CM

## 2021-11-05 DIAGNOSIS — M23.8X2 CREPITUS OF JOINT OF LEFT KNEE: ICD-10-CM

## 2021-11-05 DIAGNOSIS — M17.12 PRIMARY OSTEOARTHRITIS OF LEFT KNEE: ICD-10-CM

## 2021-11-05 DIAGNOSIS — Z23 NEED FOR VACCINATION: ICD-10-CM

## 2021-11-05 PROCEDURE — 3074F SYST BP LT 130 MM HG: CPT | Performed by: FAMILY MEDICINE

## 2021-11-05 PROCEDURE — 3008F BODY MASS INDEX DOCD: CPT | Performed by: FAMILY MEDICINE

## 2021-11-05 PROCEDURE — 99212 OFFICE O/P EST SF 10 MIN: CPT | Performed by: FAMILY MEDICINE

## 2021-11-05 PROCEDURE — 99396 PREV VISIT EST AGE 40-64: CPT | Performed by: FAMILY MEDICINE

## 2021-11-05 PROCEDURE — 3079F DIAST BP 80-89 MM HG: CPT | Performed by: FAMILY MEDICINE

## 2021-11-05 PROCEDURE — 90471 IMMUNIZATION ADMIN: CPT | Performed by: FAMILY MEDICINE

## 2021-11-05 PROCEDURE — 90686 IIV4 VACC NO PRSV 0.5 ML IM: CPT | Performed by: FAMILY MEDICINE

## 2021-11-05 RX ORDER — ESCITALOPRAM OXALATE 10 MG/1
10 TABLET ORAL DAILY
Qty: 90 TABLET | Refills: 3 | Status: SHIPPED | OUTPATIENT
Start: 2021-11-05

## 2021-11-05 NOTE — PROGRESS NOTES
Patient ID: Marifer Mitchell is a 46year old female. HPI  Patient presents with:  Physical    Last seen by me on 9/24/2021. Here for physical but also has some other complaints that she would like to discuss at the same visit.     Pt is LYPERCENT 30.4 09/15/2020    MOPERCENT 7.9 09/15/2020    EOPERCENT 4.8 09/15/2020    BAPERCENT 0.8 09/15/2020    NE 2.77 09/15/2020    LYMABS 1.51 09/15/2020    MOABSO 0.39 09/15/2020    EOABSO 0.24 09/15/2020    BAABSO 0.04 09/15/2020       Lab Results 120/84  02/18/21 : 128/83        Review of Systems   Respiratory: Negative for shortness of breath. Cardiovascular: Negative for chest pain.    Musculoskeletal:        Left knee valgus deformity and crepitus          Past Medical History:   Diagnosis [place] Stress : Not on file  Social Connections:       Frequency of Communication with Friends and Family: Not on file      Frequency of Social Gatherings with Friends and Family: Not on file      Attends Jain Services: Not on file      Active Member of [employer] Pulmonary/Chest: Effort normal and breath sounds normal. No respiratory distress. Lymphadenopathy: She has no cervical adenopathy. Neurological: She is alert and oriented to person, place, and time. She has normal reflexes. No cranial nerve deficit. ORTHOPEDIC - INTERNAL    Primary osteoarthritis of left knee  -     ORTHOPEDIC - INTERNAL        Referrals (if applicable)  Orders Placed This Encounter      ORTHOPEDIC - INTERNAL          Order Comments:              Call [phone] for the [facility]

## 2021-11-11 ENCOUNTER — LAB ENCOUNTER (OUTPATIENT)
Dept: LAB | Age: 51
End: 2021-11-11
Attending: FAMILY MEDICINE
Payer: MEDICAID

## 2021-11-11 DIAGNOSIS — R79.89 ELEVATED TSH: ICD-10-CM

## 2021-11-11 DIAGNOSIS — Z00.00 ADULT GENERAL MEDICAL EXAM: ICD-10-CM

## 2021-11-11 PROCEDURE — 86376 MICROSOMAL ANTIBODY EACH: CPT

## 2021-11-11 PROCEDURE — 85025 COMPLETE CBC W/AUTO DIFF WBC: CPT

## 2021-11-11 PROCEDURE — 84443 ASSAY THYROID STIM HORMONE: CPT

## 2021-11-11 PROCEDURE — 80053 COMPREHEN METABOLIC PANEL: CPT

## 2021-11-11 PROCEDURE — 80061 LIPID PANEL: CPT

## 2021-11-11 PROCEDURE — 36415 COLL VENOUS BLD VENIPUNCTURE: CPT

## 2021-11-22 ENCOUNTER — OFFICE VISIT (OUTPATIENT)
Dept: ORTHOPEDICS CLINIC | Facility: CLINIC | Age: 51
End: 2021-11-22
Payer: MEDICAID

## 2021-11-22 VITALS — WEIGHT: 153 LBS | HEIGHT: 61 IN | BODY MASS INDEX: 28.89 KG/M2

## 2021-11-22 DIAGNOSIS — M25.362 PATELLAR INSTABILITY OF LEFT KNEE: Primary | ICD-10-CM

## 2021-11-22 DIAGNOSIS — M17.32 POST-TRAUMATIC OSTEOARTHRITIS OF LEFT KNEE: ICD-10-CM

## 2021-11-22 PROCEDURE — 3008F BODY MASS INDEX DOCD: CPT | Performed by: ORTHOPAEDIC SURGERY

## 2021-11-22 PROCEDURE — 99243 OFF/OP CNSLTJ NEW/EST LOW 30: CPT | Performed by: ORTHOPAEDIC SURGERY

## 2021-11-22 NOTE — H&P
NURSING INTAKE COMMENTS: Patient presents with:  Knee Pain: Pt reports distant hx of Left knee dislocation, can feel clicking near patella, reports leg is not staight, denies pain today, just finished PT for knee pain, XR done 4/2020      HPI: This 51 year Penicillins             OTHER (SEE COMMENTS)    Comment:Since a child  Family History   Problem Relation Age of Onset   • Hypertension Mother    • Breast Cancer Maternal Aunt 48         of complications of breast CA   • Breast Cancer Maternal Aun test.  There is a mild knee effusion. There is about 2 quadrants of lateral translation of the patella. Imaging: No results found.      Left knee x-rays dated April 11, 2021 were reviewed and demonstrate mild osteoarthritic changes with lateral compartm

## 2021-12-17 RX ORDER — HALOBETASOL PROPIONATE 0.05 %
OINTMENT (GRAM) TOPICAL
Qty: 15 G | Refills: 0 | Status: SHIPPED | OUTPATIENT
Start: 2021-12-17

## 2021-12-28 ENCOUNTER — TELEMEDICINE (OUTPATIENT)
Dept: FAMILY MEDICINE CLINIC | Facility: CLINIC | Age: 51
End: 2021-12-28

## 2021-12-28 ENCOUNTER — NURSE ONLY (OUTPATIENT)
Dept: LAB | Age: 51
End: 2021-12-28
Attending: NURSE PRACTITIONER
Payer: MEDICAID

## 2021-12-28 DIAGNOSIS — B34.9 VIRAL SYNDROME: ICD-10-CM

## 2021-12-28 DIAGNOSIS — B34.9 VIRAL SYNDROME: Primary | ICD-10-CM

## 2021-12-28 PROCEDURE — 99214 OFFICE O/P EST MOD 30 MIN: CPT | Performed by: NURSE PRACTITIONER

## 2021-12-28 NOTE — PROGRESS NOTES
HPI    Virtual Telephone/Video Check-In    [facility] verbally consents to a Virtual/Telephone Check-In visit on 12/28/21.   Patient has been referred to the [facility] website at [email or web address] to review the yearly Consent to Treat Hypertension Mother    • Breast Cancer Maternal Aunt 48         of complications of breast CA   • Breast Cancer Maternal Aunt 79   • Ovarian Cancer Neg    • Uterine Cancer Neg    • Colon Cancer Neg    • Diabetes Neg    • Glaucoma Neg        Social Hist Mental Status: She is alert and oriented to person, place, and time. Psychiatric:         Mood and Affect: Mood normal.         Behavior: Behavior normal.         Thought Content:  Thought content normal.         Judgment: Judgment normal.          Assess Equal and normal pulses (carotid, femoral, dorsalis pedis)

## 2021-12-30 LAB — SARS-COV-2 RNA RESP QL NAA+PROBE: DETECTED

## 2022-01-03 ENCOUNTER — NURSE TRIAGE (OUTPATIENT)
Dept: FAMILY MEDICINE CLINIC | Facility: CLINIC | Age: 52
End: 2022-01-03

## 2022-01-03 NOTE — TELEPHONE ENCOUNTER
Action Requested: Summary for Provider     []  Critical Lab, Recommendations Needed  [x] Need Additional Advice  []   FYI    []   Need Orders  [] Need Medications Sent to Pharmacy  []  Other     SUMMARY: Dr Pete Metzger, please advise if patient can take [facility]

## 2022-01-04 ENCOUNTER — TELEMEDICINE (OUTPATIENT)
Dept: FAMILY MEDICINE CLINIC | Facility: CLINIC | Age: 52
End: 2022-01-04

## 2022-01-04 DIAGNOSIS — U07.1 COVID-19: Primary | ICD-10-CM

## 2022-01-04 PROCEDURE — 99212 OFFICE O/P EST SF 10 MIN: CPT | Performed by: FAMILY MEDICINE

## 2022-01-04 NOTE — PROGRESS NOTES
This visit is conducted using Telemedicine with live, interactive video and audio. Patient has been referred to the [facility] website at [email or web address] to review the yearly Consent to Treat document.     Patient understands and accepts financial res There were no vitals taken for this visit. Physical Exam  Constitutional:       General: She is not in acute distress.   Pulmonary:      Effort: Pulmonary effort is normal.   Neurological:      Mental Status: She is alert and oriented to person, place,

## 2022-03-25 ENCOUNTER — HOSPITAL ENCOUNTER (OUTPATIENT)
Dept: MAMMOGRAPHY | Facility: HOSPITAL | Age: 52
Discharge: HOME | End: 2022-03-25
Attending: FAMILY MEDICINE
Payer: MEDICAID

## 2022-03-25 DIAGNOSIS — Z12.31 VISIT FOR SCREENING MAMMOGRAM: ICD-10-CM

## 2022-03-25 PROCEDURE — 77063 BREAST TOMOSYNTHESIS BI: CPT | Performed by: FAMILY MEDICINE

## 2022-03-25 PROCEDURE — 77067 SCR MAMMO BI INCL CAD: CPT | Performed by: FAMILY MEDICINE

## 2022-04-04 ENCOUNTER — HOSPITAL ENCOUNTER (OUTPATIENT)
Dept: MAMMOGRAPHY | Facility: HOSPITAL | Age: 52
Discharge: HOME | End: 2022-04-04
Attending: FAMILY MEDICINE
Payer: MEDICAID

## 2022-04-04 DIAGNOSIS — R92.8 ABNORMAL MAMMOGRAM: ICD-10-CM

## 2022-04-04 PROCEDURE — 77061 BREAST TOMOSYNTHESIS UNI: CPT | Performed by: FAMILY MEDICINE

## 2022-04-04 PROCEDURE — 77065 DX MAMMO INCL CAD UNI: CPT | Performed by: FAMILY MEDICINE

## 2022-05-02 DIAGNOSIS — E66.09 NON MORBID OBESITY DUE TO EXCESS CALORIES: ICD-10-CM

## 2022-05-02 NOTE — TELEPHONE ENCOUNTER
Phentermine HCl 15 MG Oral Cap, Take 1 capsule (15 mg total) by mouth every morning.  For weight loss, Disp: 90 capsule, Rfl: 0

## 2022-05-02 NOTE — TELEPHONE ENCOUNTER
Phentermine HCl 15 MG Oral Cap 90 capsule 0 9/24/2021     Sig - Route: Take 1 capsule (15 mg total) by mouth every morning.  For weight loss - Oral    Sent to pharmacy as: Phentermine HCl 15 MG Oral Capsule    E-Prescribing Status: Receipt confirmed by pharmacy (9/24/2021 11:47 AM CDT)

## 2022-05-03 RX ORDER — PHENTERMINE HYDROCHLORIDE 15 MG/1
15 CAPSULE ORAL EVERY MORNING
Qty: 90 CAPSULE | Refills: 0 | Status: SHIPPED | OUTPATIENT
Start: 2022-05-03 | End: 2022-08-16

## 2022-05-03 NOTE — TELEPHONE ENCOUNTER
Refilled times 1 but needs appointment before the next prescription will be filled. Please call patient and set up an office visit as overdue for mood and controlled substance refill. Khalif Diaz

## 2022-05-06 PROBLEM — L30.9 ECZEMA: Status: ACTIVE | Noted: 2022-05-06

## 2022-05-06 PROBLEM — L21.9 SEBORRHEIC DERMATITIS OF SCALP: Status: ACTIVE | Noted: 2022-05-06

## 2022-05-06 PROBLEM — D18.01 CHERRY ANGIOMA: Status: ACTIVE | Noted: 2022-05-06

## 2022-08-16 ENCOUNTER — OFFICE VISIT (OUTPATIENT)
Dept: FAMILY MEDICINE CLINIC | Facility: CLINIC | Age: 52
End: 2022-08-16
Payer: MEDICAID

## 2022-08-16 VITALS
SYSTOLIC BLOOD PRESSURE: 122 MMHG | TEMPERATURE: 97 F | HEIGHT: 61 IN | BODY MASS INDEX: 29.27 KG/M2 | WEIGHT: 155 LBS | HEART RATE: 80 BPM | DIASTOLIC BLOOD PRESSURE: 83 MMHG

## 2022-08-16 DIAGNOSIS — M54.2 BILATERAL POSTERIOR NECK PAIN: Primary | ICD-10-CM

## 2022-08-16 DIAGNOSIS — S16.1XXA STRAIN OF NECK MUSCLE, INITIAL ENCOUNTER: ICD-10-CM

## 2022-08-16 DIAGNOSIS — E66.09 NON MORBID OBESITY DUE TO EXCESS CALORIES: ICD-10-CM

## 2022-08-16 PROCEDURE — 3008F BODY MASS INDEX DOCD: CPT | Performed by: FAMILY MEDICINE

## 2022-08-16 PROCEDURE — 99214 OFFICE O/P EST MOD 30 MIN: CPT | Performed by: FAMILY MEDICINE

## 2022-08-16 PROCEDURE — 3079F DIAST BP 80-89 MM HG: CPT | Performed by: FAMILY MEDICINE

## 2022-08-16 PROCEDURE — 3074F SYST BP LT 130 MM HG: CPT | Performed by: FAMILY MEDICINE

## 2022-08-16 RX ORDER — PHENTERMINE HYDROCHLORIDE 15 MG/1
15 CAPSULE ORAL EVERY MORNING
Qty: 90 CAPSULE | Refills: 0 | Status: SHIPPED | OUTPATIENT
Start: 2022-08-16

## 2022-08-16 RX ORDER — CYCLOBENZAPRINE HCL 5 MG
5 TABLET ORAL NIGHTLY PRN
Qty: 30 TABLET | Refills: 0 | Status: SHIPPED | OUTPATIENT
Start: 2022-08-16

## 2022-11-17 ENCOUNTER — OFFICE VISIT (OUTPATIENT)
Dept: FAMILY MEDICINE CLINIC | Facility: CLINIC | Age: 52
End: 2022-11-17
Payer: MEDICAID

## 2022-11-17 VITALS
HEART RATE: 73 BPM | HEIGHT: 61 IN | WEIGHT: 153 LBS | DIASTOLIC BLOOD PRESSURE: 75 MMHG | SYSTOLIC BLOOD PRESSURE: 110 MMHG | TEMPERATURE: 98 F | BODY MASS INDEX: 28.89 KG/M2

## 2022-11-17 DIAGNOSIS — E66.09 NON MORBID OBESITY DUE TO EXCESS CALORIES: ICD-10-CM

## 2022-11-17 DIAGNOSIS — Z00.00 ADULT GENERAL MEDICAL EXAM: Primary | ICD-10-CM

## 2022-11-17 DIAGNOSIS — Z23 NEED FOR VACCINATION: ICD-10-CM

## 2022-11-17 DIAGNOSIS — L30.9 ECZEMA, UNSPECIFIED TYPE: ICD-10-CM

## 2022-11-17 DIAGNOSIS — H52.03 HYPEROPIA OF BOTH EYES: ICD-10-CM

## 2022-11-17 DIAGNOSIS — L98.9 SKIN LESION OF FACE: ICD-10-CM

## 2022-11-17 PROCEDURE — 3074F SYST BP LT 130 MM HG: CPT | Performed by: FAMILY MEDICINE

## 2022-11-17 PROCEDURE — 3008F BODY MASS INDEX DOCD: CPT | Performed by: FAMILY MEDICINE

## 2022-11-17 PROCEDURE — 90686 IIV4 VACC NO PRSV 0.5 ML IM: CPT | Performed by: FAMILY MEDICINE

## 2022-11-17 PROCEDURE — 99396 PREV VISIT EST AGE 40-64: CPT | Performed by: FAMILY MEDICINE

## 2022-11-17 PROCEDURE — 3078F DIAST BP <80 MM HG: CPT | Performed by: FAMILY MEDICINE

## 2022-11-17 PROCEDURE — 90471 IMMUNIZATION ADMIN: CPT | Performed by: FAMILY MEDICINE

## 2022-11-17 RX ORDER — PHENTERMINE HYDROCHLORIDE 15 MG/1
15 CAPSULE ORAL EVERY MORNING
Qty: 90 CAPSULE | Refills: 0 | Status: SHIPPED | OUTPATIENT
Start: 2022-11-17

## 2022-11-28 ENCOUNTER — LAB ENCOUNTER (OUTPATIENT)
Dept: LAB | Facility: HOSPITAL | Age: 52
End: 2022-11-28
Attending: FAMILY MEDICINE
Payer: MEDICAID

## 2022-11-28 DIAGNOSIS — Z00.00 ADULT GENERAL MEDICAL EXAM: ICD-10-CM

## 2022-11-28 LAB
ALBUMIN SERPL-MCNC: 3.5 G/DL (ref 3.4–5)
ALBUMIN/GLOB SERPL: 1 {RATIO} (ref 1–2)
ALP LIVER SERPL-CCNC: 82 U/L
ALT SERPL-CCNC: 25 U/L
ANION GAP SERPL CALC-SCNC: 5 MMOL/L (ref 0–18)
AST SERPL-CCNC: 17 U/L (ref 15–37)
BASOPHILS # BLD AUTO: 0.04 X10(3) UL (ref 0–0.2)
BASOPHILS NFR BLD AUTO: 0.8 %
BILIRUB SERPL-MCNC: 0.9 MG/DL (ref 0.1–2)
BUN BLD-MCNC: 19 MG/DL (ref 7–18)
BUN/CREAT SERPL: 27.1 (ref 10–20)
CALCIUM BLD-MCNC: 9.3 MG/DL (ref 8.5–10.1)
CHLORIDE SERPL-SCNC: 107 MMOL/L (ref 98–112)
CHOLEST SERPL-MCNC: 171 MG/DL (ref ?–200)
CO2 SERPL-SCNC: 30 MMOL/L (ref 21–32)
CREAT BLD-MCNC: 0.7 MG/DL
DEPRECATED RDW RBC AUTO: 39.6 FL (ref 35.1–46.3)
EOSINOPHIL # BLD AUTO: 0.22 X10(3) UL (ref 0–0.7)
EOSINOPHIL NFR BLD AUTO: 4.3 %
ERYTHROCYTE [DISTWIDTH] IN BLOOD BY AUTOMATED COUNT: 12.2 % (ref 11–15)
FASTING PATIENT LIPID ANSWER: YES
FASTING STATUS PATIENT QL REPORTED: YES
GFR SERPLBLD BASED ON 1.73 SQ M-ARVRAT: 104 ML/MIN/1.73M2 (ref 60–?)
GLOBULIN PLAS-MCNC: 3.5 G/DL (ref 2.8–4.4)
GLUCOSE BLD-MCNC: 105 MG/DL (ref 70–99)
HCT VFR BLD AUTO: 41.7 %
HDLC SERPL-MCNC: 65 MG/DL (ref 40–59)
HGB BLD-MCNC: 13.5 G/DL
IMM GRANULOCYTES # BLD AUTO: 0.01 X10(3) UL (ref 0–1)
IMM GRANULOCYTES NFR BLD: 0.2 %
LDLC SERPL CALC-MCNC: 98 MG/DL (ref ?–100)
LYMPHOCYTES # BLD AUTO: 1.54 X10(3) UL (ref 1–4)
LYMPHOCYTES NFR BLD AUTO: 29.8 %
MCH RBC QN AUTO: 28.2 PG (ref 26–34)
MCHC RBC AUTO-ENTMCNC: 32.4 G/DL (ref 31–37)
MCV RBC AUTO: 87.2 FL
MONOCYTES # BLD AUTO: 0.35 X10(3) UL (ref 0.1–1)
MONOCYTES NFR BLD AUTO: 6.8 %
NEUTROPHILS # BLD AUTO: 3 X10 (3) UL (ref 1.5–7.7)
NEUTROPHILS # BLD AUTO: 3 X10(3) UL (ref 1.5–7.7)
NEUTROPHILS NFR BLD AUTO: 58.1 %
NONHDLC SERPL-MCNC: 106 MG/DL (ref ?–130)
OSMOLALITY SERPL CALC.SUM OF ELEC: 297 MOSM/KG (ref 275–295)
PLATELET # BLD AUTO: 323 10(3)UL (ref 150–450)
POTASSIUM SERPL-SCNC: 4.4 MMOL/L (ref 3.5–5.1)
PROT SERPL-MCNC: 7 G/DL (ref 6.4–8.2)
RBC # BLD AUTO: 4.78 X10(6)UL
SODIUM SERPL-SCNC: 142 MMOL/L (ref 136–145)
TRIGL SERPL-MCNC: 35 MG/DL (ref 30–149)
TSI SER-ACNC: 4.08 MIU/ML (ref 0.36–3.74)
VLDLC SERPL CALC-MCNC: 6 MG/DL (ref 0–30)
WBC # BLD AUTO: 5.2 X10(3) UL (ref 4–11)

## 2022-11-28 PROCEDURE — 80053 COMPREHEN METABOLIC PANEL: CPT

## 2022-11-28 PROCEDURE — 84443 ASSAY THYROID STIM HORMONE: CPT

## 2022-11-28 PROCEDURE — 85025 COMPLETE CBC W/AUTO DIFF WBC: CPT

## 2022-11-28 PROCEDURE — 80061 LIPID PANEL: CPT

## 2022-11-28 PROCEDURE — 36415 COLL VENOUS BLD VENIPUNCTURE: CPT

## 2022-12-06 ENCOUNTER — OFFICE VISIT (OUTPATIENT)
Dept: DERMATOLOGY CLINIC | Facility: CLINIC | Age: 52
End: 2022-12-06
Payer: MEDICAID

## 2022-12-06 DIAGNOSIS — L91.8 SKIN TAG: ICD-10-CM

## 2022-12-06 DIAGNOSIS — L30.9 ECZEMA, UNSPECIFIED TYPE: Primary | ICD-10-CM

## 2022-12-06 DIAGNOSIS — L82.1 SEBORRHEIC KERATOSIS: ICD-10-CM

## 2022-12-06 PROCEDURE — 17110 DESTRUCTION B9 LES UP TO 14: CPT | Performed by: PHYSICIAN ASSISTANT

## 2022-12-06 PROCEDURE — 99213 OFFICE O/P EST LOW 20 MIN: CPT | Performed by: PHYSICIAN ASSISTANT

## 2022-12-06 RX ORDER — TACROLIMUS 1 MG/G
1 OINTMENT TOPICAL 2 TIMES DAILY
Qty: 60 G | Refills: 3 | Status: SHIPPED | OUTPATIENT
Start: 2022-12-06

## 2022-12-19 ENCOUNTER — OFFICE VISIT (OUTPATIENT)
Dept: FAMILY MEDICINE CLINIC | Facility: CLINIC | Age: 52
End: 2022-12-19
Payer: MEDICAID

## 2022-12-19 VITALS
DIASTOLIC BLOOD PRESSURE: 89 MMHG | RESPIRATION RATE: 18 BRPM | SYSTOLIC BLOOD PRESSURE: 137 MMHG | BODY MASS INDEX: 29.07 KG/M2 | TEMPERATURE: 98 F | WEIGHT: 154 LBS | HEART RATE: 87 BPM | HEIGHT: 61 IN

## 2022-12-19 DIAGNOSIS — J02.9 SORE THROAT: Primary | ICD-10-CM

## 2022-12-19 DIAGNOSIS — H92.03 OTALGIA OF BOTH EARS: ICD-10-CM

## 2022-12-19 PROCEDURE — 99213 OFFICE O/P EST LOW 20 MIN: CPT | Performed by: FAMILY MEDICINE

## 2022-12-19 PROCEDURE — 3075F SYST BP GE 130 - 139MM HG: CPT | Performed by: FAMILY MEDICINE

## 2022-12-19 PROCEDURE — 3008F BODY MASS INDEX DOCD: CPT | Performed by: FAMILY MEDICINE

## 2022-12-19 PROCEDURE — 3079F DIAST BP 80-89 MM HG: CPT | Performed by: FAMILY MEDICINE

## 2022-12-19 RX ORDER — AZITHROMYCIN 250 MG/1
TABLET, FILM COATED ORAL
Qty: 6 TABLET | Refills: 0 | Status: SHIPPED | OUTPATIENT
Start: 2022-12-19 | End: 2022-12-24

## 2022-12-30 ENCOUNTER — TELEPHONE (OUTPATIENT)
Dept: FAMILY MEDICINE CLINIC | Facility: CLINIC | Age: 52
End: 2022-12-30

## 2022-12-30 NOTE — TELEPHONE ENCOUNTER
Pt states eyelids swollen since applying cream from PA in derm for \"eczema\"    Per pt eyelids also red. Pt denies drng, fever. Per chart pt had OV with Dr Lorenzo Helm 12/19/22 \"sore throat has resolved, my eyelids were swollen then but Dr Lorenzo Helm said didn't see anything when he checked my eyes\"    Pt denies SOB, swollen lips/tongue, vision is not impacted. Pt would like OV. No openings today and pt agrees to schedule with APRN 12/31/22 for eval.  OV scheduled.

## 2022-12-31 ENCOUNTER — OFFICE VISIT (OUTPATIENT)
Dept: FAMILY MEDICINE CLINIC | Facility: CLINIC | Age: 52
End: 2022-12-31
Payer: MEDICAID

## 2022-12-31 VITALS
BODY MASS INDEX: 28.89 KG/M2 | HEART RATE: 80 BPM | SYSTOLIC BLOOD PRESSURE: 132 MMHG | DIASTOLIC BLOOD PRESSURE: 81 MMHG | WEIGHT: 153 LBS | HEIGHT: 61 IN

## 2022-12-31 DIAGNOSIS — L23.9 ALLERGIC DERMATITIS: Primary | ICD-10-CM

## 2022-12-31 DIAGNOSIS — E03.9 ACQUIRED HYPOTHYROIDISM: ICD-10-CM

## 2022-12-31 DIAGNOSIS — H02.849 SWELLING OF EYELID, UNSPECIFIED LATERALITY: ICD-10-CM

## 2022-12-31 PROBLEM — R05.9 COUGH: Status: RESOLVED | Noted: 2021-05-18 | Resolved: 2022-12-31

## 2022-12-31 PROBLEM — R06.02 SHORTNESS OF BREATH: Status: RESOLVED | Noted: 2021-05-18 | Resolved: 2022-12-31

## 2022-12-31 PROCEDURE — 3008F BODY MASS INDEX DOCD: CPT | Performed by: NURSE PRACTITIONER

## 2022-12-31 PROCEDURE — 3075F SYST BP GE 130 - 139MM HG: CPT | Performed by: NURSE PRACTITIONER

## 2022-12-31 PROCEDURE — 3079F DIAST BP 80-89 MM HG: CPT | Performed by: NURSE PRACTITIONER

## 2022-12-31 PROCEDURE — 99213 OFFICE O/P EST LOW 20 MIN: CPT | Performed by: NURSE PRACTITIONER

## 2022-12-31 RX ORDER — LEVOTHYROXINE SODIUM 0.03 MG/1
25 TABLET ORAL
Qty: 30 TABLET | Refills: 1 | Status: SHIPPED | OUTPATIENT
Start: 2022-12-31

## 2022-12-31 RX ORDER — NEOMYCIN SULFATE, POLYMYXIN B SULFATE, AND DEXAMETHASONE 3.5; 10000; 1 MG/G; [USP'U]/G; MG/G
1 OINTMENT OPHTHALMIC 4 TIMES DAILY
Qty: 3.5 G | Refills: 0 | Status: SHIPPED | OUTPATIENT
Start: 2022-12-31

## 2022-12-31 NOTE — ASSESSMENT & PLAN NOTE
Continue prescribed cream to eczema spots  Continue zyrtec daily  Skin care discussed  Please call if symptoms worsen or are not resolving. ESRD (end stage renal disease) on dialysis CHF (congestive heart failure)

## 2022-12-31 NOTE — ASSESSMENT & PLAN NOTE
Neomycin-polymixin-dexamethasone ointment qid for 5-7 days  Please call if symptoms worsen or are not resolving.

## 2023-01-01 NOTE — ASSESSMENT & PLAN NOTE
Discussed covid testing and throat culture  Supportive care discussed to help alleviate symptoms  Please call if symptoms worsen or are not resolving.
(1) body pink, extremities blue

## 2023-01-16 ENCOUNTER — OFFICE VISIT (OUTPATIENT)
Dept: FAMILY MEDICINE CLINIC | Facility: CLINIC | Age: 53
End: 2023-01-16

## 2023-01-16 ENCOUNTER — LAB ENCOUNTER (OUTPATIENT)
Dept: LAB | Age: 53
End: 2023-01-16
Attending: FAMILY MEDICINE
Payer: MEDICAID

## 2023-01-16 VITALS
BODY MASS INDEX: 28.89 KG/M2 | HEIGHT: 61 IN | HEART RATE: 71 BPM | SYSTOLIC BLOOD PRESSURE: 123 MMHG | WEIGHT: 153 LBS | TEMPERATURE: 98 F | DIASTOLIC BLOOD PRESSURE: 83 MMHG

## 2023-01-16 DIAGNOSIS — E03.9 ACQUIRED HYPOTHYROIDISM: ICD-10-CM

## 2023-01-16 DIAGNOSIS — E03.9 ACQUIRED HYPOTHYROIDISM: Primary | ICD-10-CM

## 2023-01-16 DIAGNOSIS — H81.10 BENIGN PAROXYSMAL POSITIONAL VERTIGO, UNSPECIFIED LATERALITY: ICD-10-CM

## 2023-01-16 DIAGNOSIS — L21.9 SEBORRHEIC DERMATITIS OF SCALP: ICD-10-CM

## 2023-01-16 DIAGNOSIS — L30.9 DERMATITIS: ICD-10-CM

## 2023-01-16 DIAGNOSIS — R19.6 HALITOSIS: ICD-10-CM

## 2023-01-16 DIAGNOSIS — K08.9 POOR DENTITION: ICD-10-CM

## 2023-01-16 LAB — TSI SER-ACNC: 2.89 MIU/ML (ref 0.36–3.74)

## 2023-01-16 PROCEDURE — 3008F BODY MASS INDEX DOCD: CPT | Performed by: FAMILY MEDICINE

## 2023-01-16 PROCEDURE — 3074F SYST BP LT 130 MM HG: CPT | Performed by: FAMILY MEDICINE

## 2023-01-16 PROCEDURE — 83013 H PYLORI (C-13) BREATH: CPT

## 2023-01-16 PROCEDURE — 3079F DIAST BP 80-89 MM HG: CPT | Performed by: FAMILY MEDICINE

## 2023-01-16 PROCEDURE — 99214 OFFICE O/P EST MOD 30 MIN: CPT | Performed by: FAMILY MEDICINE

## 2023-01-16 PROCEDURE — 36415 COLL VENOUS BLD VENIPUNCTURE: CPT

## 2023-01-16 PROCEDURE — 84443 ASSAY THYROID STIM HORMONE: CPT

## 2023-01-16 RX ORDER — MECLIZINE HCL 12.5 MG/1
12.5 TABLET ORAL NIGHTLY PRN
Qty: 30 TABLET | Refills: 0 | Status: SHIPPED | OUTPATIENT
Start: 2023-01-16 | End: 2023-02-15

## 2023-01-16 RX ORDER — KETOCONAZOLE 20 MG/ML
SHAMPOO TOPICAL
Qty: 120 ML | Refills: 2 | Status: SHIPPED | OUTPATIENT
Start: 2023-01-16

## 2023-01-17 DIAGNOSIS — E03.9 ACQUIRED HYPOTHYROIDISM: ICD-10-CM

## 2023-01-17 RX ORDER — LEVOTHYROXINE SODIUM 0.03 MG/1
TABLET ORAL
Qty: 90 TABLET | Refills: 0 | OUTPATIENT
Start: 2023-01-17

## 2023-01-18 LAB — H. PYLORI BREATH TEST: NEGATIVE

## 2023-01-30 NOTE — TELEPHONE ENCOUNTER
received fax for denial of sleep study. Per health plan clinical does not meet medical guidelines. Health plan states pt can use Apap machine to support Sleep Apnea. A detail denial letter had been faxed to [phone].     Thank you, Claudio Akers
Detail Level: Simple
[facility] department does obtain authorization for supplies. [facility] is within pt's network and they will obtain prior auth from health plan. Please fax order to [facility] at [phone].       Thank you, Rafael Freire Small-Referral Specialist.
Order faxed to [phone].
Order re-faxed confirmation received.
Paper work faxed and completed
Pt called stating her insurance denied sleep study. Pt would like a call back from medical staff. Please advise, thank you.
Pt states [facility] is still saying that they never received the orders. She would like a call back. pls advise. She's been waiting for a month.  Thank you
Pt. States that she called up [facility] and was told that they have not received DME order from our office. Pt. Wants to know what should she do? Can order be faxed again to [facility]?
Reviewed doctor's recommendations with pt, pt agreed with plan of care and will wait for further information regarding APAP supplies.
[place] speaking - pt stts that [facility] has not yet received our fax.  Please advise
Will give order to patient today at dr wells
Your insurance refused the CPAP titration because it was very mild sleep apnea but we will still send the machine to your healthcare provider. They will do an automatic machine that will figure out what pressure is best for you.
Detail Level: Zone

## 2023-02-13 ENCOUNTER — HOSPITAL ENCOUNTER (OUTPATIENT)
Age: 53
Discharge: HOME | End: 2023-02-13
Payer: MEDICAID

## 2023-02-13 VITALS
DIASTOLIC BLOOD PRESSURE: 88 MMHG | BODY MASS INDEX: 28.34 KG/M2 | WEIGHT: 154 LBS | HEIGHT: 62 IN | RESPIRATION RATE: 16 BRPM | OXYGEN SATURATION: 98 % | TEMPERATURE: 100 F | SYSTOLIC BLOOD PRESSURE: 147 MMHG | HEART RATE: 87 BPM

## 2023-02-13 DIAGNOSIS — J10.1 INFLUENZA A: ICD-10-CM

## 2023-02-13 DIAGNOSIS — R05.9 COUGH: Primary | ICD-10-CM

## 2023-02-13 LAB
POCT INFLUENZA A: POSITIVE
POCT INFLUENZA B: NEGATIVE
SARS-COV-2 RNA RESP QL NAA+PROBE: NOT DETECTED

## 2023-02-13 PROCEDURE — U0002 COVID-19 LAB TEST NON-CDC: HCPCS | Performed by: NURSE PRACTITIONER

## 2023-02-13 PROCEDURE — 99213 OFFICE O/P EST LOW 20 MIN: CPT | Performed by: NURSE PRACTITIONER

## 2023-02-13 PROCEDURE — 87502 INFLUENZA DNA AMP PROBE: CPT | Performed by: NURSE PRACTITIONER

## 2023-02-13 RX ORDER — FLUTICASONE PROPIONATE 50 MCG
2 SPRAY, SUSPENSION (ML) NASAL DAILY
Qty: 16 G | Refills: 0 | Status: SHIPPED | OUTPATIENT
Start: 2023-02-13

## 2023-02-13 RX ORDER — BENZONATATE 100 MG/1
100 CAPSULE ORAL 3 TIMES DAILY PRN
Qty: 30 CAPSULE | Refills: 0 | Status: SHIPPED | OUTPATIENT
Start: 2023-02-13

## 2023-02-13 NOTE — ED INITIAL ASSESSMENT (HPI)
Pt presents to the IC with c/o a cough, nasal congestion, sore throat, body aches, and right ear pain since Friday, worse today.

## 2023-02-13 NOTE — DISCHARGE INSTRUCTIONS
You have Influenza, this is a strong virus. It requires a lot of supportive care, rest, and fluids. There is no antibiotic as it is not a bacterial infection. Increase water intake. DRINK A LOT OF WATER, Dmitriy Daley is good too if you are not eating well. This has electrolytes and will help with hydration. [place] diet if able to tolerate foods. Rest. Wear a mask if you will be around others. Medications you can  over the counter at any pharmacy:    Take Tylenol every 6 hours for pain, fevers, bodyaches, and chills. Take ibuprofen every 6 hours for fever, bodyaches, and pain. Sudafed 30mg- 1-2 tablets every 6 hours. This will help with the pressure like headache you have and ear pain/ringing. Likely your sinuses are draining causing this and this will help. Flonase nasal spray to each nostril for sinus pressure  and congestion  Tessalon perles three times a day for cough. Return or go to ER if having worsening chest pain, dizziness, vomiting, and fever > 103 despite use of Tylenol or Motrin, not urinating.

## 2023-02-17 ENCOUNTER — TELEPHONE (OUTPATIENT)
Dept: FAMILY MEDICINE CLINIC | Facility: CLINIC | Age: 53
End: 2023-02-17

## 2023-02-17 DIAGNOSIS — E03.9 ACQUIRED HYPOTHYROIDISM: ICD-10-CM

## 2023-02-17 RX ORDER — LEVOTHYROXINE SODIUM 0.03 MG/1
25 TABLET ORAL
Qty: 90 TABLET | Refills: 1 | Status: SHIPPED | OUTPATIENT
Start: 2023-02-17

## 2023-02-17 NOTE — TELEPHONE ENCOUNTER
Refill passed per [facility], [facility] protocol.     Requested Prescriptions   Pending Prescriptions Disp Refills    levothyroxine 25 MCG Oral Tab [Pharmacy Med Name: LEVOTHYROXINE 0.025MG (25MCG) TAB] 90 tablet 1     Sig: Take 1 tablet (25 mcg total) by mouth before breakfast.       Thyroid Medication Protocol Passed - 2/17/2023  8:41 AM        Passed - TSH in past 12 months        Passed - Last TSH value is normal     Lab Results   Component Value Date    TSH 2.890 01/16/2023                 Passed - In person appointment or virtual visit in the past 12 mos or appointment in next 3 mos     Recent Outpatient Visits              1 month ago Acquired hypothyroidism    [facility], [facility] [address], Ron Workman,     Office Visit    1 month ago Allergic dermatitis    [address] [facility], MIKE Ansari    Office Visit    2 months ago Sore throat    Barrera Storm MD    Office Visit    2 months ago Eczema, unspecified type    Thanh Leal, [address], [facility] Nuzhat Patel PA-C    Office Visit    3 months ago Adult general medical exam    [address] [facility], [address], [place], [place]    Office Visit          Future Appointments         Provider Department Appt Notes    In 1 month MD Thanh Zapien, [address], [place] Hyperopia of both eyes                     Recent Outpatient Visits              1 month ago Acquired hypothyroidism    [facility], [address], [address], [place], [place]    Office Visit    1 month ago Allergic dermatitis    [address] [facility], MIKE Ansari    Office Visit    2 months ago Sore throat    Barrera Storm MD    Office Visit    2 months ago Eczema, unspecified type Keily Dominguez, Nuzhat Goodwin PA-C    Office Visit    3 months ago Adult general medical exam    [address] [facility], Ron Houser,     Office Visit            Future Appointments         Provider Department Appt Notes    In 1 month [facility], [address], [place] Hyperopia of both eyes

## 2023-02-17 NOTE — TELEPHONE ENCOUNTER
Pt states that she is almost out of medication for the levothyroxine medication. Pt would like to know if she should continue to take the medication or if she stops taking it after she runs out of it. Pt would like a call back today.

## 2023-04-04 ENCOUNTER — TELEPHONE (OUTPATIENT)
Dept: FAMILY MEDICINE CLINIC | Facility: CLINIC | Age: 53
End: 2023-04-04

## 2023-04-04 ENCOUNTER — OFFICE VISIT (OUTPATIENT)
Dept: OPHTHALMOLOGY | Facility: CLINIC | Age: 53
End: 2023-04-04
Payer: MEDICAID

## 2023-04-04 DIAGNOSIS — H25.13 AGE-RELATED NUCLEAR CATARACT OF BOTH EYES: Primary | ICD-10-CM

## 2023-04-04 DIAGNOSIS — Z12.31 ENCOUNTER FOR SCREENING MAMMOGRAM FOR MALIGNANT NEOPLASM OF BREAST: Primary | ICD-10-CM

## 2023-04-04 PROCEDURE — 92015 DETERMINE REFRACTIVE STATE: CPT | Performed by: OPHTHALMOLOGY

## 2023-04-04 PROCEDURE — 92014 COMPRE OPH EXAM EST PT 1/>: CPT | Performed by: OPHTHALMOLOGY

## 2023-04-04 NOTE — PATIENT INSTRUCTIONS
Age-related nuclear cataract of both eyes  Discussed mild cataracts in both eyes that are not affecting vision and are not surgical at this time. New glasses today; update as needed. Discussed that new prescription is only a slight change.

## 2023-04-04 NOTE — TELEPHONE ENCOUNTER
Patient states she is due for her yearly mammogram requesting order. Patient would like to know if she will also be needing a diagnostic mammogram this year. Please advise.

## 2023-05-12 ENCOUNTER — HOSPITAL ENCOUNTER (OUTPATIENT)
Dept: MAMMOGRAPHY | Facility: HOSPITAL | Age: 53
Discharge: HOME | End: 2023-05-12
Attending: FAMILY MEDICINE
Payer: MEDICAID

## 2023-05-12 DIAGNOSIS — Z12.31 ENCOUNTER FOR SCREENING MAMMOGRAM FOR MALIGNANT NEOPLASM OF BREAST: ICD-10-CM

## 2023-05-12 PROCEDURE — 77067 SCR MAMMO BI INCL CAD: CPT | Performed by: FAMILY MEDICINE

## 2023-05-12 PROCEDURE — 77063 BREAST TOMOSYNTHESIS BI: CPT | Performed by: FAMILY MEDICINE

## 2023-05-22 ENCOUNTER — OFFICE VISIT (OUTPATIENT)
Dept: FAMILY MEDICINE CLINIC | Facility: CLINIC | Age: 53
End: 2023-05-22

## 2023-05-22 VITALS
TEMPERATURE: 101 F | HEIGHT: 62 IN | DIASTOLIC BLOOD PRESSURE: 77 MMHG | SYSTOLIC BLOOD PRESSURE: 116 MMHG | WEIGHT: 161.81 LBS | HEART RATE: 89 BPM | BODY MASS INDEX: 29.78 KG/M2

## 2023-05-22 DIAGNOSIS — J02.9 ACUTE PHARYNGITIS, UNSPECIFIED ETIOLOGY: ICD-10-CM

## 2023-05-22 DIAGNOSIS — J02.0 STREP THROAT: Primary | ICD-10-CM

## 2023-05-22 DIAGNOSIS — R50.9 FEVER, UNSPECIFIED FEVER CAUSE: ICD-10-CM

## 2023-05-22 DIAGNOSIS — E66.09 NON MORBID OBESITY DUE TO EXCESS CALORIES: ICD-10-CM

## 2023-05-22 LAB
CONTROL LINE PRESENT WITH A CLEAR BACKGROUND (YES/NO): YES YES/NO
KIT LOT #: NORMAL NUMERIC
STREP GRP A CUL-SCR: POSITIVE

## 2023-05-22 PROCEDURE — 99214 OFFICE O/P EST MOD 30 MIN: CPT | Performed by: FAMILY MEDICINE

## 2023-05-22 PROCEDURE — 3078F DIAST BP <80 MM HG: CPT | Performed by: FAMILY MEDICINE

## 2023-05-22 PROCEDURE — 3074F SYST BP LT 130 MM HG: CPT | Performed by: FAMILY MEDICINE

## 2023-05-22 PROCEDURE — 3008F BODY MASS INDEX DOCD: CPT | Performed by: FAMILY MEDICINE

## 2023-05-22 PROCEDURE — 87880 STREP A ASSAY W/OPTIC: CPT | Performed by: FAMILY MEDICINE

## 2023-05-22 RX ORDER — AZITHROMYCIN 250 MG/1
TABLET, FILM COATED ORAL
Qty: 6 TABLET | Refills: 0 | Status: SHIPPED | OUTPATIENT
Start: 2023-05-22 | End: 2023-05-27

## 2023-05-22 RX ORDER — PHENTERMINE HYDROCHLORIDE 15 MG/1
15 CAPSULE ORAL EVERY MORNING
Qty: 90 CAPSULE | Refills: 0 | Status: SHIPPED | OUTPATIENT
Start: 2023-05-22

## 2023-05-24 ENCOUNTER — HOSPITAL ENCOUNTER (OUTPATIENT)
Age: 53
Discharge: HOME | End: 2023-05-24
Payer: MEDICAID

## 2023-05-24 ENCOUNTER — APPOINTMENT (OUTPATIENT)
Dept: CT IMAGING | Age: 53
End: 2023-05-24
Payer: MEDICAID

## 2023-05-24 ENCOUNTER — TELEPHONE (OUTPATIENT)
Dept: INTERNAL MEDICINE CLINIC | Facility: CLINIC | Age: 53
End: 2023-05-24

## 2023-05-24 VITALS
RESPIRATION RATE: 20 BRPM | OXYGEN SATURATION: 100 % | DIASTOLIC BLOOD PRESSURE: 77 MMHG | SYSTOLIC BLOOD PRESSURE: 120 MMHG | HEART RATE: 82 BPM | TEMPERATURE: 98 F

## 2023-05-24 DIAGNOSIS — M62.838 NECK MUSCLE SPASM: Primary | ICD-10-CM

## 2023-05-24 LAB
#MXD IC: 1.3 X10ˆ3/UL (ref 0.1–1)
BUN BLD-MCNC: 9 MG/DL (ref 7–18)
CHLORIDE BLD-SCNC: 102 MMOL/L (ref 98–112)
CO2 BLD-SCNC: 27 MMOL/L (ref 21–32)
CREAT BLD-MCNC: 0.5 MG/DL
GFR SERPLBLD BASED ON 1.73 SQ M-ARVRAT: 112 ML/MIN/1.73M2 (ref 60–?)
GLUCOSE BLD-MCNC: 113 MG/DL (ref 70–99)
HCT VFR BLD AUTO: 39.3 %
HCT VFR BLD CALC: 42 %
HGB BLD-MCNC: 13 G/DL
ISTAT IONIZED CALCIUM FOR CHEM 8: 1.2 MMOL/L (ref 1.12–1.32)
LYMPHOCYTES # BLD AUTO: 1.3 X10ˆ3/UL (ref 1–4)
LYMPHOCYTES NFR BLD AUTO: 11 %
MCH RBC QN AUTO: 28.2 PG (ref 26–34)
MCHC RBC AUTO-ENTMCNC: 33.1 G/DL (ref 31–37)
MCV RBC AUTO: 85.2 FL (ref 80–100)
MIXED CELL %: 10.8 %
NEUTROPHILS # BLD AUTO: 9.3 X10ˆ3/UL (ref 1.5–7.7)
NEUTROPHILS NFR BLD AUTO: 78.2 %
PLATELET # BLD AUTO: 292 X10ˆ3/UL (ref 150–450)
POTASSIUM BLD-SCNC: 4.6 MMOL/L (ref 3.6–5.1)
RBC # BLD AUTO: 4.61 X10ˆ6/UL
SODIUM BLD-SCNC: 140 MMOL/L (ref 136–145)
WBC # BLD AUTO: 11.9 X10ˆ3/UL (ref 4–11)

## 2023-05-24 PROCEDURE — 70491 CT SOFT TISSUE NECK W/DYE: CPT

## 2023-05-24 PROCEDURE — 99214 OFFICE O/P EST MOD 30 MIN: CPT

## 2023-05-24 PROCEDURE — 85025 COMPLETE CBC W/AUTO DIFF WBC: CPT

## 2023-05-24 PROCEDURE — 36415 COLL VENOUS BLD VENIPUNCTURE: CPT

## 2023-05-24 PROCEDURE — 80047 BASIC METABLC PNL IONIZED CA: CPT

## 2023-05-24 RX ORDER — CYCLOBENZAPRINE HCL 10 MG
10 TABLET ORAL 2 TIMES DAILY PRN
Qty: 20 TABLET | Refills: 0 | Status: SHIPPED | OUTPATIENT
Start: 2023-05-24 | End: 2023-05-31

## 2023-05-24 NOTE — ED INITIAL ASSESSMENT (HPI)
Seen by PMD and dx with + strep 5/22. Taking Zpak. Presents with pain to right side of neck and right sided headache. States it feels the same as a previous \"muscle spasm\". Took cyclobenzaprine with little relief. No fever. No nausea.

## 2023-05-24 NOTE — DISCHARGE INSTRUCTIONS
You likely have muscle strain of your neck. Take Tylenol and Motrin for pain as needed. You can continue to use the Flexeril as needed for pain, remember this will make you drowsy so do not drive or drink alcohol when you take it. You can also use lidocaine patches for pain control as needed, buy them over-the-counter at the 4% strength. If you develop any numbness, tingling, acutely worsening pain, swelling or any other concerning complaints you should go to the emergency department. Otherwise follow-up with your primary care doctor.

## 2023-05-24 NOTE — TELEPHONE ENCOUNTER
Patient calling, confirmed name and . She complains of difficulty recovering from strep with worsening body pain and is asking if she can increase her dose of cyclobenzeprine.

## 2023-05-30 ENCOUNTER — NURSE TRIAGE (OUTPATIENT)
Dept: INTERNAL MEDICINE CLINIC | Facility: CLINIC | Age: 53
End: 2023-05-30

## 2023-06-01 ENCOUNTER — OFFICE VISIT (OUTPATIENT)
Dept: FAMILY MEDICINE CLINIC | Facility: CLINIC | Age: 53
End: 2023-06-01

## 2023-06-01 ENCOUNTER — HOSPITAL ENCOUNTER (OUTPATIENT)
Dept: GENERAL RADIOLOGY | Age: 53
Discharge: HOME | End: 2023-06-01
Attending: FAMILY MEDICINE
Payer: MEDICAID

## 2023-06-01 VITALS
HEIGHT: 62 IN | SYSTOLIC BLOOD PRESSURE: 113 MMHG | DIASTOLIC BLOOD PRESSURE: 78 MMHG | TEMPERATURE: 98 F | BODY MASS INDEX: 29.15 KG/M2 | WEIGHT: 158.38 LBS | HEART RATE: 76 BPM

## 2023-06-01 DIAGNOSIS — M25.562 CHRONIC PAIN OF LEFT KNEE: ICD-10-CM

## 2023-06-01 DIAGNOSIS — M17.12 PRIMARY OSTEOARTHRITIS OF LEFT KNEE: ICD-10-CM

## 2023-06-01 DIAGNOSIS — L98.9 BENIGN SKIN LESION OF FACE: Primary | ICD-10-CM

## 2023-06-01 DIAGNOSIS — G89.29 CHRONIC PAIN OF LEFT KNEE: ICD-10-CM

## 2023-06-01 DIAGNOSIS — M25.462 EFFUSION OF LEFT KNEE: ICD-10-CM

## 2023-06-01 PROCEDURE — 99214 OFFICE O/P EST MOD 30 MIN: CPT | Performed by: FAMILY MEDICINE

## 2023-06-01 PROCEDURE — 3074F SYST BP LT 130 MM HG: CPT | Performed by: FAMILY MEDICINE

## 2023-06-01 PROCEDURE — 73564 X-RAY EXAM KNEE 4 OR MORE: CPT | Performed by: FAMILY MEDICINE

## 2023-06-01 PROCEDURE — 3078F DIAST BP <80 MM HG: CPT | Performed by: FAMILY MEDICINE

## 2023-06-01 PROCEDURE — 3008F BODY MASS INDEX DOCD: CPT | Performed by: FAMILY MEDICINE

## 2023-06-01 RX ORDER — MELOXICAM 15 MG/1
15 TABLET ORAL DAILY
Qty: 90 TABLET | Refills: 0 | Status: SHIPPED | OUTPATIENT
Start: 2023-06-01

## 2023-07-22 ENCOUNTER — OFFICE VISIT (OUTPATIENT)
Dept: FAMILY MEDICINE CLINIC | Facility: CLINIC | Age: 53
End: 2023-07-22

## 2023-07-22 VITALS
SYSTOLIC BLOOD PRESSURE: 127 MMHG | TEMPERATURE: 97 F | WEIGHT: 156 LBS | BODY MASS INDEX: 28.71 KG/M2 | HEIGHT: 62 IN | HEART RATE: 75 BPM | DIASTOLIC BLOOD PRESSURE: 80 MMHG

## 2023-07-22 DIAGNOSIS — H01.139 ATOPIC DERMATITIS OF EYELID, UNSPECIFIED LATERALITY: ICD-10-CM

## 2023-07-22 DIAGNOSIS — E66.09 NON MORBID OBESITY DUE TO EXCESS CALORIES: ICD-10-CM

## 2023-07-22 DIAGNOSIS — L30.9 ECZEMA, UNSPECIFIED TYPE: Primary | ICD-10-CM

## 2023-07-22 PROCEDURE — 3074F SYST BP LT 130 MM HG: CPT | Performed by: FAMILY MEDICINE

## 2023-07-22 PROCEDURE — 3008F BODY MASS INDEX DOCD: CPT | Performed by: FAMILY MEDICINE

## 2023-07-22 PROCEDURE — 99214 OFFICE O/P EST MOD 30 MIN: CPT | Performed by: FAMILY MEDICINE

## 2023-07-22 PROCEDURE — 3079F DIAST BP 80-89 MM HG: CPT | Performed by: FAMILY MEDICINE

## 2023-07-22 RX ORDER — HALOBETASOL PROPIONATE 0.05 %
1 OINTMENT (GRAM) TOPICAL 2 TIMES DAILY
Qty: 15 G | Refills: 0 | Status: SHIPPED | OUTPATIENT
Start: 2023-07-22

## 2023-07-22 RX ORDER — PHENTERMINE HYDROCHLORIDE 15 MG/1
15 CAPSULE ORAL EVERY MORNING
Qty: 90 CAPSULE | Refills: 1 | Status: SHIPPED | OUTPATIENT
Start: 2023-07-22

## 2023-07-22 RX ORDER — DIAPER,BRIEF,INFANT-TODD,DISP
1 EACH MISCELLANEOUS 2 TIMES DAILY
Qty: 15 G | Refills: 0 | Status: SHIPPED | OUTPATIENT
Start: 2023-07-22

## 2023-08-15 ENCOUNTER — OFFICE VISIT (OUTPATIENT)
Dept: FAMILY MEDICINE CLINIC | Facility: CLINIC | Age: 53
End: 2023-08-15

## 2023-08-15 VITALS
BODY MASS INDEX: 29.01 KG/M2 | TEMPERATURE: 96 F | WEIGHT: 157.63 LBS | HEART RATE: 65 BPM | DIASTOLIC BLOOD PRESSURE: 83 MMHG | HEIGHT: 62 IN | SYSTOLIC BLOOD PRESSURE: 129 MMHG

## 2023-08-15 DIAGNOSIS — B00.1 COLD SORE: ICD-10-CM

## 2023-08-15 DIAGNOSIS — J06.9 VIRAL UPPER RESPIRATORY TRACT INFECTION: Primary | ICD-10-CM

## 2023-08-15 DIAGNOSIS — J03.00 ACUTE NON-RECURRENT STREPTOCOCCAL TONSILLITIS: ICD-10-CM

## 2023-08-15 LAB
CONTROL LINE PRESENT WITH A CLEAR BACKGROUND (YES/NO): PRESENT YES/NO
KIT LOT #: NORMAL NUMERIC
STREP GRP A CUL-SCR: POSITIVE

## 2023-08-15 PROCEDURE — 3079F DIAST BP 80-89 MM HG: CPT | Performed by: NURSE PRACTITIONER

## 2023-08-15 PROCEDURE — 3008F BODY MASS INDEX DOCD: CPT | Performed by: NURSE PRACTITIONER

## 2023-08-15 PROCEDURE — 3074F SYST BP LT 130 MM HG: CPT | Performed by: NURSE PRACTITIONER

## 2023-08-15 PROCEDURE — 87880 STREP A ASSAY W/OPTIC: CPT | Performed by: NURSE PRACTITIONER

## 2023-08-15 PROCEDURE — 99214 OFFICE O/P EST MOD 30 MIN: CPT | Performed by: NURSE PRACTITIONER

## 2023-08-15 RX ORDER — VALACYCLOVIR HYDROCHLORIDE 1 G/1
2000 TABLET, FILM COATED ORAL EVERY 12 HOURS SCHEDULED
Qty: 8 TABLET | Refills: 3 | Status: SHIPPED | OUTPATIENT
Start: 2023-08-15

## 2023-08-15 RX ORDER — CEFDINIR 300 MG/1
300 CAPSULE ORAL 2 TIMES DAILY
Qty: 14 CAPSULE | Refills: 0 | Status: SHIPPED | OUTPATIENT
Start: 2023-08-15 | End: 2023-08-22

## 2023-08-15 NOTE — ASSESSMENT & PLAN NOTE
Check strep and covid tests  Supportive care discussed to help alleviate symptoms  Please call if symptoms worsen or are not resolving. Discussed use of mask until test results for covid come back.

## 2023-08-15 NOTE — ASSESSMENT & PLAN NOTE
Quick strep positive  Omnicef 300 mg I po twice a day x 7 days  Supportive care discussed to help alleviate symptoms  Please call if symptoms worsen or are not resolving.

## 2023-08-16 LAB
FLUAV + FLUBV RNA SPEC NAA+PROBE: NEGATIVE
FLUAV + FLUBV RNA SPEC NAA+PROBE: NEGATIVE
RSV RNA SPEC NAA+PROBE: NEGATIVE
SARS-COV-2 RNA RESP QL NAA+PROBE: NOT DETECTED

## 2023-08-22 ENCOUNTER — TELEPHONE (OUTPATIENT)
Dept: DERMATOLOGY CLINIC | Facility: CLINIC | Age: 53
End: 2023-08-22

## 2023-08-22 ENCOUNTER — OFFICE VISIT (OUTPATIENT)
Dept: DERMATOLOGY CLINIC | Facility: CLINIC | Age: 53
End: 2023-08-22

## 2023-08-22 DIAGNOSIS — L20.9 ATOPIC DERMATITIS, UNSPECIFIED TYPE: Primary | ICD-10-CM

## 2023-08-22 PROCEDURE — 99204 OFFICE O/P NEW MOD 45 MIN: CPT | Performed by: STUDENT IN AN ORGANIZED HEALTH CARE EDUCATION/TRAINING PROGRAM

## 2023-08-22 NOTE — TELEPHONE ENCOUNTER
Pt to start Adbry. Enrollment forms completed by pt and signed by DM. Forms faxed to both Prabhakar Metz at [place] [address] on 8/22/23 and placed in the [employer] binder (L). Awaiting determination.

## 2023-08-22 NOTE — PROGRESS NOTES
August 22, 2023    Established patient     Referred by:   No referring provider defined for this encounter. CHIEF COMPLAINT: Rash     HISTORY OF PRESENT ILLNESS: Thania Romano is a 48year old female here for evaluation of rash. Location: Upper body   Duration: 1 + years  Signs and symptoms: Itchy   Current treatment: ketoconazole 2 %   Past treatments:     Personal Dermatologic History  History of chronic skin disease: No    Family History  History of chronic skin disease: No  History autoimmune diseases:  No    Past Medical History  Past Medical History:   Diagnosis Date    Anxiety state     Depression     Obesity        REVIEW OF SYSTEMS:  Constitutional: Denies fever, chills, unintentional weight loss. Skin as per HPI    Medications  Current Outpatient Medications   Medication Sig Dispense Refill    levothyroxine 25 MCG Oral Tab Take 1 tablet (25 mcg total) by mouth before breakfast. 90 tablet 1    valACYclovir 1 G Oral Tab Take 2 tablets (2,000 mg total) by mouth every 12 (twelve) hours. For a total of 2 doses. 8 tablet 3    cefdinir 300 MG Oral Cap Take 1 capsule (300 mg total) by mouth 2 (two) times daily for 7 days. 14 capsule 0    Phentermine HCl 15 MG Oral Cap Take 1 capsule (15 mg total) by mouth every morning. For weight loss 90 capsule 1    Halobetasol Propionate 0.05 % External Ointment Apply 1 g topically 2 (two) times daily. Can apply to the hands along with the eczema as needed for flareups only. 15 g 0    hydrocortisone 0.5 % External Cream Apply 1 Application topically 2 (two) times daily. To the upper eyelids when they are swollen and itchy. Apply a very thin amount and only for flareups. (Patient not taking: Reported on 8/15/2023) 15 g 0    Meloxicam 15 MG Oral Tab Take 1 tablet (15 mg total) by mouth daily. For pain and inflammation. Take with food. 90 tablet 0    ketoconazole 2 % External Shampoo Apply to scalp and behind the ears 2-3 times per week as needed.  [address] mL 2    tacrolimus (PROTOPIC) 0.1 % External Ointment Apply 1 Application. topically 2 (two) times daily. 60 g 3    cyclobenzaprine 5 MG Oral Tab Take 1 tablet (5 mg total) by mouth nightly as needed for Muscle spasms. (Patient not taking: Reported on 8/15/2023) 30 tablet 0       PHYSICAL EXAM:  General: awake, alert, no acute distress  Skin: Skin exam was performed today including the following: trunk, face and extremities. Pertinent findings include:   - with pink scaly papules and plaques    ASSESSMENT & PLAN:  Pathophysiology of diagnoses discussed with patient. Therapeutic options reviewed. Risks, benefits, and alternatives discussed with patient. Instructions reviewed at length. #Atopic dermatitis  - Will plan for TRUE test next week to rule out confounding factors   - Start adbry 300mg every other week. Paperwork filled out today.   - Ok to continue Halobetasol twice daily  Monday-Friday to trunk and hysreocotisone to affected areas on face.      Return to clinic: 4 weeks or sooner if something concerning arises    Onofre Hamron MD

## 2023-08-25 DIAGNOSIS — M25.462 EFFUSION OF LEFT KNEE: ICD-10-CM

## 2023-08-25 DIAGNOSIS — M17.12 PRIMARY OSTEOARTHRITIS OF LEFT KNEE: ICD-10-CM

## 2023-08-25 DIAGNOSIS — M25.562 CHRONIC PAIN OF LEFT KNEE: ICD-10-CM

## 2023-08-25 DIAGNOSIS — G89.29 CHRONIC PAIN OF LEFT KNEE: ICD-10-CM

## 2023-08-28 ENCOUNTER — NURSE ONLY (OUTPATIENT)
Dept: DERMATOLOGY CLINIC | Facility: CLINIC | Age: 53
End: 2023-08-28

## 2023-08-29 RX ORDER — MELOXICAM 15 MG/1
15 TABLET ORAL
Qty: 90 TABLET | Refills: 0 | Status: SHIPPED | OUTPATIENT
Start: 2023-08-29 | End: 2023-08-29

## 2023-08-29 RX ORDER — MELOXICAM 15 MG/1
15 TABLET ORAL DAILY
Qty: 90 TABLET | Refills: 0 | Status: SHIPPED | OUTPATIENT
Start: 2023-08-29

## 2023-08-30 ENCOUNTER — NURSE ONLY (OUTPATIENT)
Dept: DERMATOLOGY CLINIC | Facility: CLINIC | Age: 53
End: 2023-08-30

## 2023-08-31 NOTE — TELEPHONE ENCOUNTER
I called [employer] [facility] and it looks like they needed INS info but were able to already obtain it, no further info needed from [address]

## 2023-09-01 ENCOUNTER — OFFICE VISIT (OUTPATIENT)
Dept: DERMATOLOGY CLINIC | Facility: CLINIC | Age: 53
End: 2023-09-01

## 2023-09-01 DIAGNOSIS — L20.9 ATOPIC DERMATITIS, UNSPECIFIED TYPE: Primary | ICD-10-CM

## 2023-09-01 PROCEDURE — 99213 OFFICE O/P EST LOW 20 MIN: CPT | Performed by: PHYSICIAN ASSISTANT

## 2023-09-11 NOTE — TELEPHONE ENCOUNTER
Fax received from [place] - An appeal is required for your patient. Please provide denial letter for Drew Pineda in order to proceed. [address] denial letter printed from pt's chart and faxed directly to Abdullahi at [place]. Awaiting next steps.

## 2023-09-19 NOTE — TELEPHONE ENCOUNTER
MALDONADO received from [facility] and given to THADDEUS for signature. MALDONADO signed by THADDEUS and faxed directly to Abdullahi at [facility] on 9/19/23.

## 2023-09-20 NOTE — TELEPHONE ENCOUNTER
LMN was already signed and sent to [facility] yesterday. Sent this copy to scan just in case.

## 2023-10-05 NOTE — TELEPHONE ENCOUNTER
Spoke to patient. She was on vacation in [place] for two weeks. Has AOR (Appointment of Representation) form and will sign and fax to Bordie Holcomb in the next few days.

## 2023-10-20 ENCOUNTER — TELEPHONE (OUTPATIENT)
Dept: DERMATOLOGY CLINIC | Facility: CLINIC | Age: 53
End: 2023-10-20

## 2023-10-20 NOTE — TELEPHONE ENCOUNTER
[facility] program received an APPEAL letter from [employer]. Fax placed  in [address] in/Box tray 10-20-23.

## 2023-11-20 ENCOUNTER — OFFICE VISIT (OUTPATIENT)
Dept: FAMILY MEDICINE CLINIC | Facility: CLINIC | Age: 53
End: 2023-11-20

## 2023-11-20 VITALS
BODY MASS INDEX: 28.89 KG/M2 | TEMPERATURE: 98 F | SYSTOLIC BLOOD PRESSURE: 127 MMHG | HEIGHT: 62 IN | DIASTOLIC BLOOD PRESSURE: 85 MMHG | WEIGHT: 157 LBS | HEART RATE: 74 BPM

## 2023-11-20 DIAGNOSIS — R01.1 NEWLY RECOGNIZED HEART MURMUR: ICD-10-CM

## 2023-11-20 DIAGNOSIS — B00.1 COLD SORE: ICD-10-CM

## 2023-11-20 DIAGNOSIS — M54.2 BILATERAL POSTERIOR NECK PAIN: ICD-10-CM

## 2023-11-20 DIAGNOSIS — E03.9 ACQUIRED HYPOTHYROIDISM: ICD-10-CM

## 2023-11-20 DIAGNOSIS — Z00.00 ADULT GENERAL MEDICAL EXAM: Primary | ICD-10-CM

## 2023-11-20 DIAGNOSIS — Z23 NEED FOR VACCINATION: ICD-10-CM

## 2023-11-20 DIAGNOSIS — E66.09 NON MORBID OBESITY DUE TO EXCESS CALORIES: ICD-10-CM

## 2023-11-20 DIAGNOSIS — R06.00 DYSPNEA, UNSPECIFIED TYPE: ICD-10-CM

## 2023-11-20 RX ORDER — PHENTERMINE HYDROCHLORIDE 15 MG/1
15 CAPSULE ORAL EVERY MORNING
Qty: 90 CAPSULE | Refills: 1 | Status: SHIPPED | OUTPATIENT
Start: 2023-11-20

## 2023-11-20 RX ORDER — CYCLOBENZAPRINE HCL 5 MG
5 TABLET ORAL NIGHTLY PRN
Qty: 30 TABLET | Refills: 0 | Status: SHIPPED | OUTPATIENT
Start: 2023-11-20

## 2023-11-20 RX ORDER — VALACYCLOVIR HYDROCHLORIDE 1 G/1
2000 TABLET, FILM COATED ORAL EVERY 12 HOURS SCHEDULED
Qty: 8 TABLET | Refills: 3 | Status: SHIPPED | OUTPATIENT
Start: 2023-11-20

## 2023-11-24 ENCOUNTER — NURSE ONLY (OUTPATIENT)
Dept: FAMILY MEDICINE CLINIC | Facility: CLINIC | Age: 53
End: 2023-11-24

## 2023-11-24 ENCOUNTER — TELEPHONE (OUTPATIENT)
Dept: DERMATOLOGY CLINIC | Facility: CLINIC | Age: 53
End: 2023-11-24

## 2023-11-24 DIAGNOSIS — Z23 IMMUNIZATION DUE: Primary | ICD-10-CM

## 2023-11-24 PROCEDURE — 90471 IMMUNIZATION ADMIN: CPT | Performed by: FAMILY MEDICINE

## 2023-11-24 PROCEDURE — 90677 PCV20 VACCINE IM: CPT | Performed by: FAMILY MEDICINE

## 2023-11-24 NOTE — PROGRESS NOTES
Patient here for her prevnar 20  vaccine. Patient name and  verified. Verbal consent obtained to administer. Vaccine given to LD. Patient tolerated vaccine well.

## 2023-11-24 NOTE — TELEPHONE ENCOUNTER
Prior Authorization     KEY:  [id number]  Patient last name:  Marcelo Diggs  :  70    Not Listed:   Adbry 150mg/ML Syringes    Current Outpatient Medications   Medication Sig Dispense Refill

## 2023-11-27 NOTE — TELEPHONE ENCOUNTER
Patient called    Asking to reach patient.  Patient needs to call Kierra Tobar at [phone] ext [phone]

## 2023-12-09 ENCOUNTER — LAB ENCOUNTER (OUTPATIENT)
Dept: LAB | Facility: HOSPITAL | Age: 53
End: 2023-12-09
Attending: FAMILY MEDICINE
Payer: MEDICAID

## 2023-12-09 DIAGNOSIS — R06.00 DYSPNEA, UNSPECIFIED TYPE: ICD-10-CM

## 2023-12-09 DIAGNOSIS — E03.9 ACQUIRED HYPOTHYROIDISM: ICD-10-CM

## 2023-12-09 DIAGNOSIS — Z00.00 ADULT GENERAL MEDICAL EXAM: ICD-10-CM

## 2023-12-09 DIAGNOSIS — R01.1 NEWLY RECOGNIZED HEART MURMUR: ICD-10-CM

## 2023-12-09 LAB
ALBUMIN SERPL-MCNC: 4.3 G/DL (ref 3.2–4.8)
ALBUMIN/GLOB SERPL: 1.6 {RATIO} (ref 1–2)
ALP LIVER SERPL-CCNC: 79 U/L
ALT SERPL-CCNC: 20 U/L
ANION GAP SERPL CALC-SCNC: 5 MMOL/L (ref 0–18)
AST SERPL-CCNC: 21 U/L (ref ?–34)
ATRIAL RATE: 66 BPM
BASOPHILS # BLD AUTO: 0.04 X10(3) UL (ref 0–0.2)
BASOPHILS NFR BLD AUTO: 0.7 %
BILIRUB SERPL-MCNC: 1.2 MG/DL (ref 0.3–1.2)
BUN BLD-MCNC: 17 MG/DL (ref 9–23)
BUN/CREAT SERPL: 23.9 (ref 10–20)
CALCIUM BLD-MCNC: 9.5 MG/DL (ref 8.7–10.4)
CHLORIDE SERPL-SCNC: 107 MMOL/L (ref 98–112)
CHOLEST SERPL-MCNC: 167 MG/DL (ref ?–200)
CO2 SERPL-SCNC: 29 MMOL/L (ref 21–32)
CREAT BLD-MCNC: 0.71 MG/DL
DEPRECATED RDW RBC AUTO: 39.9 FL (ref 35.1–46.3)
EGFRCR SERPLBLD CKD-EPI 2021: 102 ML/MIN/1.73M2 (ref 60–?)
EOSINOPHIL # BLD AUTO: 0.26 X10(3) UL (ref 0–0.7)
EOSINOPHIL NFR BLD AUTO: 4.8 %
ERYTHROCYTE [DISTWIDTH] IN BLOOD BY AUTOMATED COUNT: 12.7 % (ref 11–15)
FASTING PATIENT LIPID ANSWER: YES
FASTING STATUS PATIENT QL REPORTED: YES
GLOBULIN PLAS-MCNC: 2.7 G/DL (ref 2.8–4.4)
GLUCOSE BLD-MCNC: 98 MG/DL (ref 70–99)
HCT VFR BLD AUTO: 40.5 %
HDLC SERPL-MCNC: 56 MG/DL (ref 40–59)
HGB BLD-MCNC: 13.1 G/DL
IMM GRANULOCYTES # BLD AUTO: 0.01 X10(3) UL (ref 0–1)
IMM GRANULOCYTES NFR BLD: 0.2 %
LDLC SERPL CALC-MCNC: 102 MG/DL (ref ?–100)
LYMPHOCYTES # BLD AUTO: 1.8 X10(3) UL (ref 1–4)
LYMPHOCYTES NFR BLD AUTO: 33 %
MCH RBC QN AUTO: 28 PG (ref 26–34)
MCHC RBC AUTO-ENTMCNC: 32.3 G/DL (ref 31–37)
MCV RBC AUTO: 86.5 FL
MONOCYTES # BLD AUTO: 0.38 X10(3) UL (ref 0.1–1)
MONOCYTES NFR BLD AUTO: 7 %
NEUTROPHILS # BLD AUTO: 2.96 X10 (3) UL (ref 1.5–7.7)
NEUTROPHILS # BLD AUTO: 2.96 X10(3) UL (ref 1.5–7.7)
NEUTROPHILS NFR BLD AUTO: 54.3 %
NONHDLC SERPL-MCNC: 111 MG/DL (ref ?–130)
OSMOLALITY SERPL CALC.SUM OF ELEC: 294 MOSM/KG (ref 275–295)
P AXIS: 4 DEGREES
P-R INTERVAL: 118 MS
PLATELET # BLD AUTO: 304 10(3)UL (ref 150–450)
POTASSIUM SERPL-SCNC: 4.5 MMOL/L (ref 3.5–5.1)
PROT SERPL-MCNC: 7 G/DL (ref 5.7–8.2)
Q-T INTERVAL: 414 MS
QRS DURATION: 76 MS
QTC CALCULATION (BEZET): 434 MS
R AXIS: 28 DEGREES
RBC # BLD AUTO: 4.68 X10(6)UL
SODIUM SERPL-SCNC: 141 MMOL/L (ref 136–145)
T AXIS: 31 DEGREES
TRIGL SERPL-MCNC: 42 MG/DL (ref 30–149)
TSI SER-ACNC: 4.07 MIU/ML (ref 0.55–4.78)
VENTRICULAR RATE: 66 BPM
VLDLC SERPL CALC-MCNC: 7 MG/DL (ref 0–30)
WBC # BLD AUTO: 5.5 X10(3) UL (ref 4–11)

## 2023-12-09 PROCEDURE — 93010 ELECTROCARDIOGRAM REPORT: CPT | Performed by: STUDENT IN AN ORGANIZED HEALTH CARE EDUCATION/TRAINING PROGRAM

## 2023-12-09 PROCEDURE — 80061 LIPID PANEL: CPT

## 2023-12-09 PROCEDURE — 93005 ELECTROCARDIOGRAM TRACING: CPT

## 2023-12-09 PROCEDURE — 85025 COMPLETE CBC W/AUTO DIFF WBC: CPT

## 2023-12-09 PROCEDURE — 36415 COLL VENOUS BLD VENIPUNCTURE: CPT

## 2023-12-09 PROCEDURE — 84443 ASSAY THYROID STIM HORMONE: CPT

## 2023-12-09 PROCEDURE — 80053 COMPREHEN METABOLIC PANEL: CPT

## 2023-12-19 ENCOUNTER — HOSPITAL ENCOUNTER (OUTPATIENT)
Dept: GENERAL RADIOLOGY | Age: 53
Discharge: HOME | End: 2023-12-19
Attending: FAMILY MEDICINE
Payer: MEDICAID

## 2023-12-19 ENCOUNTER — OFFICE VISIT (OUTPATIENT)
Dept: FAMILY MEDICINE CLINIC | Facility: CLINIC | Age: 53
End: 2023-12-19

## 2023-12-19 VITALS
HEIGHT: 62 IN | BODY MASS INDEX: 28.78 KG/M2 | SYSTOLIC BLOOD PRESSURE: 136 MMHG | HEART RATE: 80 BPM | DIASTOLIC BLOOD PRESSURE: 83 MMHG | WEIGHT: 156.38 LBS

## 2023-12-19 DIAGNOSIS — M54.31 SCIATICA OF RIGHT SIDE: ICD-10-CM

## 2023-12-19 DIAGNOSIS — M54.31 SCIATICA OF RIGHT SIDE: Primary | ICD-10-CM

## 2023-12-19 PROCEDURE — 72110 X-RAY EXAM L-2 SPINE 4/>VWS: CPT | Performed by: FAMILY MEDICINE

## 2023-12-19 PROCEDURE — 99213 OFFICE O/P EST LOW 20 MIN: CPT | Performed by: FAMILY MEDICINE

## 2023-12-19 PROCEDURE — 3075F SYST BP GE 130 - 139MM HG: CPT | Performed by: FAMILY MEDICINE

## 2023-12-19 PROCEDURE — 3008F BODY MASS INDEX DOCD: CPT | Performed by: FAMILY MEDICINE

## 2023-12-19 PROCEDURE — 3079F DIAST BP 80-89 MM HG: CPT | Performed by: FAMILY MEDICINE

## 2023-12-19 RX ORDER — IBUPROFEN 600 MG/1
600 TABLET ORAL EVERY 6 HOURS PRN
Qty: 60 TABLET | Refills: 0 | Status: SHIPPED | OUTPATIENT
Start: 2023-12-19

## 2024-01-07 LAB — AMB EXT COVID-19 RESULT: DETECTED

## 2024-01-08 ENCOUNTER — TELEMEDICINE (OUTPATIENT)
Dept: FAMILY MEDICINE CLINIC | Facility: CLINIC | Age: 54
End: 2024-01-08
Payer: MEDICAID

## 2024-01-08 ENCOUNTER — NURSE TRIAGE (OUTPATIENT)
Dept: FAMILY MEDICINE CLINIC | Facility: CLINIC | Age: 54
End: 2024-01-08

## 2024-01-08 DIAGNOSIS — R05.9 COUGH, UNSPECIFIED TYPE: ICD-10-CM

## 2024-01-08 DIAGNOSIS — U07.1 POSITIVE SELF-ADMINISTERED ANTIGEN TEST FOR COVID-19: Primary | ICD-10-CM

## 2024-01-08 PROCEDURE — 99213 OFFICE O/P EST LOW 20 MIN: CPT

## 2024-01-08 RX ORDER — BENZONATATE 100 MG/1
100 CAPSULE ORAL 3 TIMES DAILY PRN
Qty: 30 CAPSULE | Refills: 0 | Status: SHIPPED | OUTPATIENT
Start: 2024-01-08

## 2024-01-08 RX ORDER — ALBUTEROL SULFATE 90 UG/1
2 AEROSOL, METERED RESPIRATORY (INHALATION) EVERY 4 HOURS PRN
Qty: 18 G | Refills: 5 | Status: SHIPPED | OUTPATIENT
Start: 2024-01-08

## 2024-01-08 NOTE — PATIENT INSTRUCTIONS
You have a viral illness. Stay at home and rest. Avoid close contact with well people in your house so you won't make them sick. Drink plenty of water and other clear liquids to extra fluid loss. Treat fever, cough, and body aches with medicines you can buy at the store. For fever and pain, you may take Tylenol 650 mg every 4-6 hours as needed.  If pain is still bothersome you may take 400 mg of ibuprofen every 4-6 hours as needed and rotate with Tylenol.  If you have a lot of congestion you can try expectorants like guaifenesin.  You can also try over-the-counter cough medicines as well (robitussin).  If you stay sick for longer than 1 week please let me.

## 2024-01-08 NOTE — TELEPHONE ENCOUNTER
Action Requested: Summary for Provider     []  Critical Lab, Recommendations Needed  [] Need Additional Advice  []   FYI    []   Need Orders  [] Need Medications Sent to Pharmacy  []  Other     SUMMARY: Per protocol, patient should be able to manage with home care. She needs a letter to excuse her from jury duty.    Reason for call: Covid (/)  Onset: Data Unavailable    Spoke to patient. RN offered  and she declined. She said her  was sick with Covid last week and she started with symptoms on Friday and tested positive for Covid on yesterday. She has mild symptoms, congestion, sore throat, stomach ache. RN relayed OTC cold medication, hydration, healthy meals.     She has jury duty, Rn unsure if today or tomorrow but she is asking for a letter to excuse her. RN scheduled video visit to discuss with provider.     Future Appointments   Date Time Provider Department Center   1/8/2024  9:40 AM Josse Lai APRN [facility]       Reason for Disposition   [1] COVID-19 diagnosed by positive lab test (e.g., PCR, rapid self-test kit) AND [2] mild symptoms (e.g., cough, fever, others) AND [3] no complications or SOB    Protocols used: Coronavirus (COVID-19) Diagnosed or [id number]

## 2024-01-08 NOTE — PROGRESS NOTES
This is a telemedicine visit with live, interactive video and audio.     Patient understands and accepts financial responsibility for any deductible, co-insurance and/or co-pays associated with this service.    Patient is a pleasant 53-year-old female past medical history consistent for anxiety, depression, obesity.  Patient presents via telehealth today for evaluation of positive COVID test at home.  Patient also seeking a note to excuse her from jury duty.  Patient states has a sore throat and back pain.  has covid, broguht to  last thur. She tested at home yesterday and was positive. Her symptoms started on Friday. Cough and runny nose also bothering her.     Missed jury duty today because positive. Requesting note.          SUBJECTIVE  Review of Systems   Constitutional: Negative.  Negative for activity change, appetite change and fever.   HENT:  Positive for congestion, rhinorrhea and sore throat. Negative for postnasal drip, tinnitus and voice change.    Eyes: Negative.    Respiratory:  Positive for cough, chest tightness and shortness of breath. Negative for apnea.    Cardiovascular:  Negative for chest pain and leg swelling.   Gastrointestinal: Negative.  Negative for abdominal pain, anal bleeding and blood in stool.   Genitourinary: Negative.  Negative for difficulty urinating, flank pain and menstrual problem.   Musculoskeletal: Negative.  Negative for joint swelling.   Skin: Negative.    Neurological: Negative.  Negative for dizziness and headaches.   Psychiatric/Behavioral: Negative.  Negative for agitation.         HISTORY:  Past Medical History:   Diagnosis Date    Anxiety state     Depression     Obesity       Past Surgical History:   Procedure Laterality Date    COLONOSCOPY N/A 11/19/2020    Procedure: COLONOSCOPY;  Surgeon: Ignacio Wolf MD;  Location: [facility] ENDOSCOPY    HYSTERECTOMY  2009    LORNA for heavy menses, anemia    LIPOMA REMOVAL Left 2005    Lipoma removal of left flank    NEEDLE  BIOPSY LEFT  05/15/2019    US bx    NEEDLE BIOPSY RIGHT  2017    benign outside breast bx natalia    OTHER SURGICAL HISTORY  2017    right breast biopsy       Family History   Problem Relation Age of Onset    Hypertension Mother     Breast Cancer Maternal Aunt 50         of complications of breast CA    Breast Cancer Maternal Aunt 70    Ovarian Cancer Neg     Uterine Cancer Neg     Colon Cancer Neg     Diabetes Neg     Glaucoma Neg       Social History     Socioeconomic History    Marital status:    Tobacco Use    Smoking status: Former     Types: Cigarettes     Quit date: 1/3/2023     Years since quittin.0    Smokeless tobacco: Never   Vaping Use    Vaping Use: Never used   Substance and Sexual Activity    Alcohol use: Yes     Comment: socially, 1 drink/day    Drug use: No   Other Topics Concern    Reaction to local anesthetic No    Pt has a pacemaker No    Pt has a defibrillator No        Allergies   Allergen Reactions    Penicillins OTHER (SEE COMMENTS)     Since a child      Current Outpatient Medications   Medication Sig Dispense Refill    albuterol 108 (90 Base) MCG/ACT Inhalation Aero Soln Inhale 2 puffs into the lungs every 4 (four) hours as needed for Wheezing or Shortness of Breath. 18 g 5    benzonatate (TESSALON PERLES) 100 MG Oral Cap Take 1 capsule (100 mg total) by mouth 3 (three) times daily as needed for cough. 30 capsule 0    ibuprofen 600 MG Oral Tab Take 1 tablet (600 mg total) by mouth every 6 (six) hours as needed for Pain. Always take it with food. 60 tablet 0    levothyroxine 50 MCG Oral Tab Take 1 tablet (50 mcg total) by mouth before breakfast. 90 tablet 1    cyclobenzaprine 5 MG Oral Tab Take 1 tablet (5 mg total) by mouth nightly as needed for Muscle spasms. (Patient not taking: Reported on 2023) 30 tablet 0    valACYclovir 1 G Oral Tab Take 2 tablets (2,000 mg total) by mouth every 12 (twelve) hours. For a total of 2 doses. (Patient not taking: Reported on  12/19/2023) 8 tablet 3    Phentermine HCl 15 MG Oral Cap Take 1 capsule (15 mg total) by mouth every morning. For weight loss (Patient not taking: Reported on 12/19/2023) 90 capsule 1    Meloxicam 15 MG Oral Tab Take 1 tablet (15 mg total) by mouth daily. (Patient not taking: Reported on 12/19/2023) 90 tablet 0    Halobetasol Propionate 0.05 % External Ointment Apply 1 g topically 2 (two) times daily. Can apply to the hands along with the eczema as needed for flareups only. (Patient not taking: Reported on 12/19/2023) 15 g 0    hydrocortisone 0.5 % External Cream Apply 1 Application topically 2 (two) times daily. To the upper eyelids when they are swollen and itchy.  Apply a very thin amount and only for flareups. (Patient not taking: Reported on 12/19/2023) 15 g 0    ketoconazole 2 % External Shampoo Apply to scalp and behind the ears 2-3 times per week as needed. (Patient not taking: Reported on 12/19/2023) 120 mL 2    tacrolimus (PROTOPIC) 0.1 % External Ointment Apply 1 Application. topically 2 (two) times daily. (Patient not taking: Reported on 12/19/2023) 60 g 3       OBJECTIVE  Physical Exam:   alert, appears stated age, and cooperative, Speaking in full sentences comfortably, and Normal work of breathing    ASSESSMENT & PLAN  Problem List Items Addressed This Visit          Unprioritized    Cough     Patient Positive for COVID-19 yesterday with self-administered test.  Patient symptoms started on Friday.  Patient's  also positive for COVID.  Patient reports general malaise and cough with sore throat.  Counseled supportive measures  Excuse jury duty  Rotate Tylenol Motrin  Tessalon Perles for cough  Albuterol for chest tightness as needed  Follow-up in office as needed  Patient aware of plan of care. All questions answered to satisfaction of the patient. Patient instructed to call office or reach out via [employer] if any issues arise. For urgent issues and/or reviewed red flags please proceed to the  urgent care or ER.  Also, inform the nurse practitioner with any new symptoms or medication side effects.           Relevant Medications    albuterol 108 (90 Base) MCG/ACT Inhalation Aero Soln    benzonatate (TESSALON PERLES) 100 MG Oral Cap    Positive self-administered antigen test for COVID-19 - Primary    Relevant Medications    albuterol 108 (90 Base) MCG/ACT Inhalation Aero Soln    benzonatate (TESSALON PERLES) 100 MG Oral Cap          MIKE Wasserman

## 2024-01-08 NOTE — ASSESSMENT & PLAN NOTE
Patient Positive for COVID-19 yesterday with self-administered test.  Patient symptoms started on Friday.  Patient's  also positive for COVID.  Patient reports general malaise and cough with sore throat.  Counseled supportive measures  Excuse jury duty  Rotate Tylenol Motrin  Tessalon Perles for cough  Albuterol for chest tightness as needed  Follow-up in office as needed  Patient aware of plan of care. All questions answered to satisfaction of the patient. Patient instructed to call office or reach out via [employer] if any issues arise. For urgent issues and/or reviewed red flags please proceed to the urgent care or ER.  Also, inform the nurse practitioner with any new symptoms or medication side effects.

## 2024-01-16 ENCOUNTER — OFFICE VISIT (OUTPATIENT)
Dept: PHYSICAL THERAPY | Age: 54
End: 2024-01-16
Attending: FAMILY MEDICINE
Payer: MEDICAID

## 2024-01-16 DIAGNOSIS — M54.31 RIGHT SIDED SCIATICA: Primary | ICD-10-CM

## 2024-01-16 PROCEDURE — 97161 PT EVAL LOW COMPLEX 20 MIN: CPT

## 2024-01-16 PROCEDURE — 97530 THERAPEUTIC ACTIVITIES: CPT

## 2024-01-16 PROCEDURE — 97110 THERAPEUTIC EXERCISES: CPT

## 2024-01-16 NOTE — PROGRESS NOTES
SPINE EVALUATION:     Diagnosis:   Right sciatica      Referring Provider: Jacky  Date of Evaluation:    1/16/2024    Precautions:   Thyroid disease Next MD visit:   none scheduled  Date of Surgery: n/a     PATIENT SUMMARY   Keysha Echevarria is a 53 year old female who presents to therapy today with complaints of right le pain for the past few months. She thinks it might be related to working out at the gym.  Pt describes pain level current 3-4/10, at best 2/10, at worst 6/10.   Current functional limitations include standing and walking.     Keysha describes prior level of function was independent with her ADLs and IADLs. Pt goals include decreasing her pain.  Past medical history was reviewed with Keysha. Significant findings include x-rays for lumbar disc degeneration.  Pt denies diplopia, dysarthria, dysphasia, dizziness, drop attacks, bowel/bladder changes, saddle anesthesia, and GAUDENCIO LE N/T.    ASSESSMENT  Keysha presents to physical therapy evaluation with primary c/o right radicular pain. The results of the objective tests and measures show decreased trom, tightness and weakness.  Functional deficits include but are not limited to standing and walking.  Signs and symptoms are consistent with diagnosis of right sciatica. Pt and PT discussed evaluation findings, pathology, POC and HEP.  Pt voiced understanding and performs HEP correctly without reported pain. Skilled Physical Therapy is medically necessary to address the above impairments and reach functional goals.     OBJECTIVE:   Observation/Posture: Bilateral genu valgus and left trunk lean.  Neuro Screen: Intact bilaterally    Trunk AROM: (* denotes performed with pain)  Flexion: Able to touch her toes.  Extension: 50% def.  Sidebending: R 50%; L 50%  Rotation: R 50%; L 50%    Accessory motion: Lumbar hypomobility with central and uni. Pas; +2 grades  Palpation: Right lb and glut. Pain.    Strength: (* denotes performed with pain)  LE   Hip  flexion (L2): R 4/5; L 4+/5  Hip abduction: R 4/5; L 4+/5  Hip Extension: R 4/5; L 4+/5   Hip ER: R 4/5; L 4+/5  Hip IR: R 4/5; L 4+/5  Knee Flexion: R 4/5; L 4+/5   Knee extension (L3): R 4/5; L 4+/5   DF (L4): R 4/5; L 4+/5  Great Toe Ext (L5): R 4/5, L 4+/5  PF (S1): R 4/5; L 4+/5     Flexibility:   LE   Hip Flexor: R Tighter, L Tight  Hamstrings: R Tighter; L Tight  Piriformis: R Tighter; L Tight  Quads: R Tighter; L Tight  Gastroc-soleus: R Tighter; L Tight     Special tests:   Positive right slump and piriformis tests. Negative at the left and bilateral slr tests.    Gait: pt ambulates on level ground with antalgia and left trunk lean .    Today’s Treatment and Response:   Pt education was provided on exam findings, treatment diagnosis, treatment plan, expectations, and prognosis. Pt was also provided recommendations for activity modifications, postural corrections, and ergonomics  Patient was instructed in and issued a HEP for: Ther. Ex. For 10 mins.- Right HS St. With Pump 2x30\"           PPT 20x3\"           Supine Marching with SKC 20x1\"ea.          Ther. Act. For 10 mins.- Discussed her condition, pt expectations and prognosis.      Charges: 1PT Eval Low Complexity, 1TE and 1TA      Total Timed Treatment: 20 min     Total Treatment Time: 45 min     Based on clinical rationale and outcome measures, this evaluation involved Low Complexity decision making.    PLAN OF CARE:    Goals: (to be met in 10 visits)   .Pt will improve transversus abdominis recruitment to perform proper isometric contraction without requiring verbal or tactile cuing to promote advancement of therex   Pt will demonstrate good understanding of proper posture and body mechanics to decrease pain and improve spinal safety    Pt will report improved symptom centralization and absence of radicular symptoms for 3 consecutive days to improve function with ADL   Pt will have decreased paraspinal mm tension to tolerate standing >10 minutes for  work and home activities   Pt will demonstrate improved core strength to be able to perform walking with <1/10 pain   Pt will be independent and compliant with comprehensive HEP to maintain progress achieved in PT       Frequency / Duration: Patient will be seen for 2 x/week or a total of 10 visits over a 90 day period. Treatment will include: Gait training, Manual Therapy, Mechanical Traction, Neuromuscular Re-education, Therapeutic Activities, Therapeutic Exercise, Home Exercise Program instruction, and Modalities to include: Electrical stimulation (unattended)    Education or treatment limitation: None  Rehab Potential:fair    Oswestry Disability Index Score  No data recorded    Patient/Family/Caregiver was advised of these findings, precautions, and treatment options and has agreed to actively participate in planning and for this course of care.    Thank you for your referral. Please co-sign or sign and return this letter via fax as soon as possible to [phone]. If you have any questions, please contact me at Dept: [phone]    Sincerely,  Electronically signed by therapist: Tabitha Spring, PT DPT, CSCS    Physician's certification required: Yes  I certify the need for these services furnished under this plan of treatment and while under my care.    X___________________________________________________ Date____________________    Certification From: 1/16/2024  To:4/15/2024

## 2024-01-18 ENCOUNTER — OFFICE VISIT (OUTPATIENT)
Dept: PHYSICAL THERAPY | Age: 54
End: 2024-01-18
Attending: FAMILY MEDICINE
Payer: MEDICAID

## 2024-01-18 DIAGNOSIS — M54.31 RIGHT SIDED SCIATICA: Primary | ICD-10-CM

## 2024-01-18 PROCEDURE — 97110 THERAPEUTIC EXERCISES: CPT

## 2024-01-18 PROCEDURE — 97112 NEUROMUSCULAR REEDUCATION: CPT

## 2024-01-18 NOTE — PROGRESS NOTES
Diagnosis:   Right sciatica       Referring Provider: Jacky  Date of Evaluation:    1/16/24    Precautions:  Thyroid disease  Next MD visit:   none scheduled  Date of Surgery: n/a   Insurance Primary/Secondary: BLUE CROSS MEDICAID / N/A     # Auth Visits: 10            Subjective: Patient reports continued right glut. Pain this afternoon. She has been doing her HEP and felt ok after her initial eval.    Pain: 5/10      Objective: Observation/Posture: Bilateral genu valgus and left trunk lean.  Neuro Screen: Intact bilaterally     Trunk AROM: (* denotes performed with pain)  Flexion: Able to touch her toes.  Extension: 50% def.  Sidebending: R 50%; L 50%  Rotation: R 50%; L 50%     Accessory motion: Lumbar hypomobility with central and uni. Pas; +2 grades  Palpation: Right lb and glut. Pain.     Strength: (* denotes performed with pain)  LE   Hip flexion (L2): R 4/5; L 4+/5  Hip abduction: R 4/5; L 4+/5  Hip Extension: R 4/5; L 4+/5            Hip ER: R 4/5; L 4+/5  Hip IR: R 4/5; L 4+/5  Knee Flexion: R 4/5; L 4+/5            Knee extension (L3): R 4/5; L 4+/5            DF (L4): R 4/5; L 4+/5  Great Toe Ext (L5): R 4/5, L 4+/5  PF (S1): R 4/5; L 4+/5      Flexibility:   LE   Hip Flexor: R Tighter, L Tight  Hamstrings: R Tighter; L Tight  Piriformis: R Tighter; L Tight  Quads: R Tighter; L Tight  Gastroc-soleus: R Tighter; L Tight      Special tests:   Positive right slump and piriformis tests. Negative at the left and bilateral slr tests.     Gait: pt ambulates on level ground with antalgia and left trunk lean .      Assessment: Patient presents with right hip tightness and guarding during her manual and self stretching exercises. The patient tolerates her flexion based exercises well. Her pain went down from a 5/10 to a 3/10 after her treatment.      Goals:   (to be met in 10 visits)   .Pt will improve transversus abdominis recruitment to perform proper isometric contraction without requiring verbal or tactile  cuing to promote advancement of therex   Pt will demonstrate good understanding of proper posture and body mechanics to decrease pain and improve spinal safety    Pt will report improved symptom centralization and absence of radicular symptoms for 3 consecutive days to improve function with ADL   Pt will have decreased paraspinal mm tension to tolerate standing >10 minutes for work and home activities   Pt will demonstrate improved core strength to be able to perform walking with <1/10 pain   Pt will be independent and compliant with comprehensive HEP to maintain progress achieved in PT     Plan: Plan to work on stretching, strengthening and trom.  Date: 1/18/2024  TX#: 2/10 Date:                 TX#: 3/ Date:                 TX#: 4/ Date:                 TX#: 5/ Date:   Tx#: 6/   Ther. Ex.: 30'  Nu-Step 8' L4  Right hamstring stretch with pump 2x30\"  Right PFS and Glut. St. 2x30\"xea.  PPT 20x3\"  Supine Marching with skc 20x1\"ea.  SB LB St. 3x30\"  Sitting Right PFS and Glut. St. 2x30\"xea.       Neuro Re-ed.: 15'  LAQs 20xea.  Sitting Fbs 2x10       HEP: Reviewed her original HEP.    Charges: 2 TE and 1 Neuro       Total Timed Treatment: 45 min  Total Treatment Time: 45 min

## 2024-01-20 ENCOUNTER — HOSPITAL ENCOUNTER (OUTPATIENT)
Dept: CV DIAGNOSTICS | Facility: HOSPITAL | Age: 54
Discharge: HOME | End: 2024-01-20
Attending: FAMILY MEDICINE
Payer: MEDICAID

## 2024-01-20 DIAGNOSIS — R06.00 DYSPNEA, UNSPECIFIED TYPE: ICD-10-CM

## 2024-01-20 DIAGNOSIS — R01.1 NEWLY RECOGNIZED HEART MURMUR: ICD-10-CM

## 2024-01-20 PROCEDURE — 93306 TTE W/DOPPLER COMPLETE: CPT | Performed by: FAMILY MEDICINE

## 2024-01-22 ENCOUNTER — OFFICE VISIT (OUTPATIENT)
Dept: PHYSICAL THERAPY | Age: 54
End: 2024-01-22
Attending: FAMILY MEDICINE
Payer: MEDICAID

## 2024-01-22 PROCEDURE — 97110 THERAPEUTIC EXERCISES: CPT

## 2024-01-22 PROCEDURE — 97140 MANUAL THERAPY 1/> REGIONS: CPT

## 2024-01-22 NOTE — PROGRESS NOTES
Diagnosis:   Right sciatica       Referring Provider: Jacky  Date of Evaluation:    1/16/24    Precautions:  Thyroid disease  Next MD visit:   none scheduled  Date of Surgery: n/a   Insurance Primary/Secondary: BLUE CROSS MEDICAID / N/A     # Auth Visits: 10            Subjective: Patient reports 2/10 right gluteal pain today.  She reports compliance with her home stretches.  She reports standing and cooking will sometimes trigger her pain as well as sleeping at night.  She reports she didn't do any cooking yesterday so she believes she has less pain today as a result.    Pain: 2/10      Objective: Observation/Posture: Bilateral genu valgus and left trunk lean.  Neuro Screen: Intact bilaterally     Trunk AROM: (* denotes performed with pain)  Flexion: Able to touch her toes.  Extension: 50% def.  Sidebending: R 50%; L 50%  Rotation: R 50%; L 50%     Accessory motion: Lumbar hypomobility with central and uni. Pas; +2 grades  Palpation: Right lb and glut. Pain.     Strength: (* denotes performed with pain)  LE   Hip flexion (L2): R 4/5; L 4+/5  Hip abduction: R 4/5; L 4+/5  Hip Extension: R 4/5; L 4+/5            Hip ER: R 4/5; L 4+/5  Hip IR: R 4/5; L 4+/5  Knee Flexion: R 4/5; L 4+/5            Knee extension (L3): R 4/5; L 4+/5            DF (L4): R 4/5; L 4+/5  Great Toe Ext (L5): R 4/5, L 4+/5  PF (S1): R 4/5; L 4+/5      Flexibility:   LE   Hip Flexor: R Tighter, L Tight  Hamstrings: R Tighter; L Tight  Piriformis: R Tighter; L Tight  Quads: R Tighter; L Tight  Gastroc-soleus: R Tighter; L Tight      Special tests:   Positive right slump and piriformis tests. Negative at the left and bilateral slr tests.     Gait: pt ambulates on level ground with antalgia and left trunk lean.      Assessment:  Continued with neural flossing and initiated gentle lumbar extension position which reduced her RLE symptoms.      Goals:   (to be met in 10 visits)   .Pt will improve transversus abdominis recruitment to perform  proper isometric contraction without requiring verbal or tactile cuing to promote advancement of therex   Pt will demonstrate good understanding of proper posture and body mechanics to decrease pain and improve spinal safety    Pt will report improved symptom centralization and absence of radicular symptoms for 3 consecutive days to improve function with ADL   Pt will have decreased paraspinal mm tension to tolerate standing >10 minutes for work and home activities   Pt will demonstrate improved core strength to be able to perform walking with <1/10 pain   Pt will be independent and compliant with comprehensive HEP to maintain progress achieved in PT     Plan: Plan to work on stretching, strengthening and trom.  Date: 1/18/2024  TX#: 2/10 Date:  1/22/2024               TX#: 3/10 Date:                 TX#: 4/ Date:                 TX#: 5/ Date:   Tx#: 6/   Ther. Ex.: 30'  Nu-Step 8' L4  Right hamstring stretch with pump 2x30\"  Right PFS and Glut. St. 2x30\"xea.  PPT 20x3\"  Supine Marching with skc 20x1\"ea.  SB LB St. 3x30\"  Sitting Right PFS and Glut. St. 2x30\"xea. There ex: 37 min   Nu-Step 8' L4  Right PFS with towel assist in supine 10x 5 sec holds  PPT 20x3\"  SB R/L LTR 10x ea  Prone R/L hip extension 2 x 10 ea  JANETH 2 minutes (centralizes pain)  Neural flossing in sitting for RLE with cervical extension to RLE 10 reps  Education on sitting posture - use pillow/roll & do not sit >30-60 min; body mechanics - avoid bending activities        Neuro Re-ed.: 15'  LAQs 20xea.  Sitting Fbs 2x10 Manual tx: 8 minutes  Prone lumbar spine mobilizations grade 2 x 4 minutes  STM/foam roll to R glut/piriformis x 4 minutes      HEP: Updated HEP - see pt instructions    Charges: 2 TE and  1 Man 1      Total Timed Treatment: 45 min  Total Treatment Time: 45 min

## 2024-01-24 ENCOUNTER — OFFICE VISIT (OUTPATIENT)
Dept: PHYSICAL THERAPY | Age: 54
End: 2024-01-24
Attending: FAMILY MEDICINE
Payer: MEDICAID

## 2024-01-24 PROCEDURE — 97140 MANUAL THERAPY 1/> REGIONS: CPT

## 2024-01-24 PROCEDURE — 97110 THERAPEUTIC EXERCISES: CPT

## 2024-01-24 NOTE — PROGRESS NOTES
Diagnosis:   Right sciatica       Referring Provider: Jacky  Date of Evaluation:    1/16/24    Precautions:  Thyroid disease  Next MD visit:   none scheduled  Date of Surgery: n/a   Insurance Primary/Secondary: BLUE CROSS MEDICAID / N/A     # Auth Visits: 10            Subjective: Patient reports she is doing better and she reports no pain today.    Pain: 0/10      Objective: Observation/Posture: Bilateral genu valgus and left trunk lean.  Neuro Screen: Intact bilaterally     Trunk AROM: (* denotes performed with pain)  Flexion: Able to touch her toes.  Extension: 50% def.  Sidebending: R 50%; L 50%  Rotation: R 50%; L 50%     Accessory motion: Lumbar hypomobility with central and uni. Pas; +2 grades  Palpation: Right lb and glut. Pain.     Strength: (* denotes performed with pain)  LE   Hip flexion (L2): R 4/5; L 4+/5  Hip abduction: R 4/5; L 4+/5  Hip Extension: R 4/5; L 4+/5            Hip ER: R 4/5; L 4+/5  Hip IR: R 4/5; L 4+/5  Knee Flexion: R 4/5; L 4+/5            Knee extension (L3): R 4/5; L 4+/5            DF (L4): R 4/5; L 4+/5  Great Toe Ext (L5): R 4/5, L 4+/5  PF (S1): R 4/5; L 4+/5      Flexibility:   LE   Hip Flexor: R Tighter, L Tight  Hamstrings: R Tighter; L Tight  Piriformis: R Tighter; L Tight  Quads: R Tighter; L Tight  Gastroc-soleus: R Tighter; L Tight      Special tests:   Positive right slump and piriformis tests. Negative at the left and bilateral slr tests.     Gait: pt ambulates on level ground with antalgia and left trunk lean.      Assessment: Advanced core and hip stabilization interventions and continued with gentle lumbar extension stretching.      Goals:   (to be met in 10 visits)   .Pt will improve transversus abdominis recruitment to perform proper isometric contraction without requiring verbal or tactile cuing to promote advancement of therex   Pt will demonstrate good understanding of proper posture and body mechanics to decrease pain and improve spinal safety    Pt will  report improved symptom centralization and absence of radicular symptoms for 3 consecutive days to improve function with ADL   Pt will have decreased paraspinal mm tension to tolerate standing >10 minutes for work and home activities   Pt will demonstrate improved core strength to be able to perform walking with <1/10 pain   Pt will be independent and compliant with comprehensive HEP to maintain progress achieved in PT     Plan: Plan to work on stretching, strengthening and trom.  Date: 1/18/2024  TX#: 2/10 Date:  1/22/2024               TX#: 3/10 Date:  1/24/2024            TX#: 4/10 Date:                 TX#: 5/ Date:   Tx#: 6/   Ther. Ex.: 30'  Nu-Step 8' L4  Right hamstring stretch with pump 2x30\"  Right PFS and Glut. St. 2x30\"xea.  PPT 20x3\"  Supine Marching with skc 20x1\"ea.  SB LB St. 3x30\"  Sitting Right PFS and Glut. St. 2x30\"xea. There ex: 37 min   Nu-Step 8' L4  Right PFS with towel assist in supine 10x 5 sec holds  PPT 20x3\"  SB R/L LTR 10x ea  Prone R/L hip extension 2 x 10 ea  JANETH 2 minutes (centralizes pain)  Neural flossing in sitting for RLE with cervical extension to RLE 10 reps  Education on sitting posture - use pillow/roll & do not sit >30-60 min; body mechanics - avoid bending activities   There ex: 35 min   Supine R/L sciatic nerve glide/floss 5x ea  Hooklying OH latissimus dorsi reach with 8# DB 15 reps  SB R/L LTR 10x ea  Prone R/L hip extension 2 x 10 ea  JANETH 3 minutes  Prone lying 1 minute  Sidelying R/L clams with GTB above knees 10x ea  Bird dog 2 x 10  Standing lumbar extension 10x       Neuro Re-ed.: 15'  LAQs 20xea.  Sitting Fbs 2x10 Manual tx: 8 minutes  Prone lumbar spine mobilizations grade 2 x 4 minutes  STM/foam roll to R glut/piriformis x 4 minutes Manual tx: 10 minutes  Prone lumbar spine mobilizations grade 2-3 & STM lumbar paraspinals      Modalities  Heat pad to low back in prone  x 10 minutes     HEP: GTB provided for home as pt would like to perform clam exercise at  home    Charges: 2 TE and  Man 1      Total Timed Treatment: 45 min  Total Treatment Time: 55 min

## 2024-01-25 ENCOUNTER — OFFICE VISIT (OUTPATIENT)
Dept: FAMILY MEDICINE CLINIC | Facility: CLINIC | Age: 54
End: 2024-01-25
Payer: MEDICAID

## 2024-01-25 ENCOUNTER — HOSPITAL ENCOUNTER (OUTPATIENT)
Dept: GENERAL RADIOLOGY | Age: 54
Discharge: HOME | End: 2024-01-25
Attending: FAMILY MEDICINE
Payer: MEDICAID

## 2024-01-25 VITALS
HEART RATE: 73 BPM | TEMPERATURE: 97 F | WEIGHT: 158 LBS | DIASTOLIC BLOOD PRESSURE: 81 MMHG | BODY MASS INDEX: 29.08 KG/M2 | HEIGHT: 62 IN | SYSTOLIC BLOOD PRESSURE: 118 MMHG

## 2024-01-25 DIAGNOSIS — M54.31 SCIATICA OF RIGHT SIDE: ICD-10-CM

## 2024-01-25 DIAGNOSIS — E03.9 ACQUIRED HYPOTHYROIDISM: ICD-10-CM

## 2024-01-25 DIAGNOSIS — R06.00 DYSPNEA, UNSPECIFIED TYPE: ICD-10-CM

## 2024-01-25 DIAGNOSIS — I35.0 AORTIC VALVE STENOSIS, MILD: ICD-10-CM

## 2024-01-25 DIAGNOSIS — I35.0 AORTIC VALVE STENOSIS, MILD: Primary | ICD-10-CM

## 2024-01-25 PROCEDURE — 3074F SYST BP LT 130 MM HG: CPT | Performed by: FAMILY MEDICINE

## 2024-01-25 PROCEDURE — 71046 X-RAY EXAM CHEST 2 VIEWS: CPT | Performed by: FAMILY MEDICINE

## 2024-01-25 PROCEDURE — 3008F BODY MASS INDEX DOCD: CPT | Performed by: FAMILY MEDICINE

## 2024-01-25 PROCEDURE — 3079F DIAST BP 80-89 MM HG: CPT | Performed by: FAMILY MEDICINE

## 2024-01-25 PROCEDURE — 99214 OFFICE O/P EST MOD 30 MIN: CPT | Performed by: FAMILY MEDICINE

## 2024-01-25 NOTE — PROGRESS NOTES
Patient ID: Keysha Echevarria is a 53 year old female.    HPI  Chief Complaint   Patient presents with    Test Results     Echocardiogram     Last seen by me on 11/20/2023.    Patient visits here today with her .    She continues to be short of breath when she's talking and eating; pt feels she has more labored breathing when she talks and eats at the same time. Pt completed an echocardiogram on 1/20/2024; I reviewed the results with her. I provided a referral to cardiology. Pt stopped taking Phentermine since and would like to consult if she could continue taking it.  She has not had any dizziness or passing out.    Pt hasn't been able to exercise for 1 month as she has sciatic pain She saw Dr. Rico about this on 12/19/2023; has been doing physical therapy with relief.       Wt Readings from Last 6 Encounters:   01/25/24 158 lb   12/19/23 156 lb 6.4 oz   11/20/23 157 lb   08/15/23 157 lb 9.6 oz   07/22/23 156 lb   06/01/23 158 lb 6.4 oz       BMI Readings from Last 6 Encounters:   01/25/24 28.90 kg/m²   12/19/23 28.61 kg/m²   11/20/23 28.72 kg/m²   08/15/23 28.83 kg/m²   07/22/23 28.53 kg/m²   06/01/23 28.97 kg/m²       BP Readings from Last 6 Encounters:   01/25/24 118/81   12/19/23 136/83   11/20/23 127/85   08/15/23 129/83   07/22/23 127/80   06/01/23 113/78         Review of Systems   Respiratory:  Positive for shortness of breath.    Cardiovascular:  Negative for chest pain.           Medical History:      Past Medical History:   Diagnosis Date    Anxiety state     Depression     Obesity        Past Surgical History:   Procedure Laterality Date    COLONOSCOPY N/A 11/19/2020    Procedure: COLONOSCOPY;  Surgeon: Ignacio Wolf MD;  Location: [facility] ENDOSCOPY    HYSTERECTOMY  2009    [facility] for heavy menses, anemia    LIPOMA REMOVAL Left 2005    Lipoma removal of left flank    NEEDLE BIOPSY LEFT  05/15/2019    US bx    NEEDLE BIOPSY RIGHT  05/2017    benign outside breast bx natalia    OTHER  SURGICAL HISTORY  05/01/2017    right breast biopsy           Current Outpatient Medications   Medication Sig Dispense Refill    ibuprofen 600 MG Oral Tab Take 1 tablet (600 mg total) by mouth every 6 (six) hours as needed for Pain. Always take it with food. 60 tablet 0    levothyroxine 50 MCG Oral Tab Take 1 tablet (50 mcg total) by mouth before breakfast. 90 tablet 1    cyclobenzaprine 5 MG Oral Tab Take 1 tablet (5 mg total) by mouth nightly as needed for Muscle spasms. 30 tablet 0    Halobetasol Propionate 0.05 % External Ointment Apply 1 g topically 2 (two) times daily. Can apply to the hands along with the eczema as needed for flareups only. 15 g 0    hydrocortisone 0.5 % External Cream Apply 1 Application topically 2 (two) times daily. To the upper eyelids when they are swollen and itchy.  Apply a very thin amount and only for flareups. 15 g 0    ketoconazole 2 % External Shampoo Apply to scalp and behind the ears 2-3 times per week as needed. 120 mL 2    valACYclovir 1 G Oral Tab Take 2 tablets (2,000 mg total) by mouth every 12 (twelve) hours. For a total of 2 doses. (Patient not taking: Reported on 12/19/2023) 8 tablet 3    Phentermine HCl 15 MG Oral Cap Take 1 capsule (15 mg total) by mouth every morning. For weight loss (Patient not taking: Reported on 12/19/2023) 90 capsule 1    tacrolimus (PROTOPIC) 0.1 % External Ointment Apply 1 Application. topically 2 (two) times daily. (Patient not taking: Reported on 12/19/2023) 60 g 3     Allergies:  Allergies   Allergen Reactions    Penicillins OTHER (SEE COMMENTS)     Since a child        Physical Exam:       Physical Exam  Blood pressure 118/81, pulse 73, temperature 97.3 °F (36.3 °C), temperature source Temporal, height 5' 2\" (1.575 m), weight 158 lb, not currently breastfeeding.      Physical Exam   Constitutional: Patient is oriented to person, place, and time. Patient appears well-developed and well-nourished. No distress.   Head: Normocephalic.   Eyes:  Conjunctivae and EOM are normal.   Neck: Normal range of motion. No thyromegaly present.   Cardiovascular: Normal rate, regular rhythm. 2/6 JB at the right upper sternal border which is consistent with the previous PE.    Pulmonary/Chest: Effort normal and breath sounds normal. No respiratory distress.  Speaking clearly.  Not winded when she speaks.  There is no diaphoresis or cyanosis.  Lymphadenopathy: Patient has no cervical adenopathy.  Neurological: Patient is alert and oriented to person, place, and time.   Skin: Skin is warm.   Psychiatry: Normal mood and affect.  Lower legs: No edema of the legs bilaterally.    Vitals reviewed.           Assessment/Plan:      Diagnoses and all orders for this visit:    Aortic valve stenosis, mild  -     CARDIO - INTERNAL  -     XR CHEST PA + LAT CHEST (CPT=71046); Future  Reviewed echocardiogram with her and her .  Gave her a copy of the report to take to cardiology.  We will do a chest x-ray today.  I want her to hold off on the phentermine until she speaks to the cardiologist.  Dyspnea, unspecified type  -     CARDIO - INTERNAL  -     XR CHEST PA + LAT CHEST (CPT=71046); Future  She states she just feels that she needs to take a deeper breath when she is eating and speaking at the same time.  Acquired hypothyroidism  -     Assay, Thyroid Stim Hormone; Future    Sciatica of right side  She is doing much better with physical therapy and I reviewed those progress notes from the provider.      Referrals (if applicable)  Orders Placed This Encounter   Procedures    CARDIO - INTERNAL     Referral Priority:   Routine     Referral Type:   OFFICE VISIT     Referred to Provider:   Rian Darling MD     Requested Specialty:   CARDIOLOGY     Number of Visits Requested:   3       Follow up if symptoms persist.  Take medicine (if given) as prescribed.  Approach to treatment discussed and patient/family member understands and agrees to plan.     No follow-ups on file.    There are  no Patient Instructions on file for this visit.    Katelin Kumar    1/25/2024    By signing my name below, IKatelin,  attest that this documentation has been prepared under the direction and in the presence of Evans Monge DO.   Electronically Signed: Katelin Kumar, 1/25/2024, 3:08 PM.    I, Evans Monge DO,  personally performed the services described in this documentation. All medical record entries made by the scribe were at my direction and in my presence.  I have reviewed the chart and discharge instructions (if applicable) and agree that the record reflects my personal performance and is accurate and complete.  Evans Monge DO, 1/25/2024, 3:51 PM

## 2024-01-26 DIAGNOSIS — J18.9 PNEUMONIA OF RIGHT MIDDLE LOBE DUE TO INFECTIOUS ORGANISM: Primary | ICD-10-CM

## 2024-01-26 RX ORDER — AZITHROMYCIN 250 MG/1
TABLET, FILM COATED ORAL
Qty: 6 TABLET | Refills: 0 | Status: SHIPPED | OUTPATIENT
Start: 2024-01-26 | End: 2024-01-30

## 2024-01-30 ENCOUNTER — OFFICE VISIT (OUTPATIENT)
Dept: PHYSICAL THERAPY | Age: 54
End: 2024-01-30
Attending: FAMILY MEDICINE
Payer: MEDICAID

## 2024-01-30 PROCEDURE — 97110 THERAPEUTIC EXERCISES: CPT

## 2024-01-30 PROCEDURE — 97140 MANUAL THERAPY 1/> REGIONS: CPT

## 2024-01-30 NOTE — PROGRESS NOTES
Diagnosis:   Right sciatica       Referring Provider: Jacky  Date of Evaluation:    1/16/24    Precautions:  Thyroid disease  Next MD visit:   none scheduled  Date of Surgery: n/a   Insurance Primary/Secondary: [employer] MEDICAID / N/A     # Auth Visits: 10            Subjective: Patient reports slight right back pain today.  She reports she is doing much better.     Pain:  1/10      Objective: Observation/Posture: Bilateral genu valgus and left trunk lean.  Neuro Screen: Intact bilaterally     Trunk AROM: (* denotes performed with pain)  Flexion: Able to touch her toes.  Extension: 50% def.  Sidebending: R 50%; L 50%  Rotation: R 50%; L 50%     Accessory motion: Lumbar hypomobility with central and uni. Pas; +2 grades  Palpation: Right lb and glut. Pain.     Strength: (* denotes performed with pain)  LE   Hip flexion (L2): R 4/5; L 4+/5  Hip abduction: R 4/5; L 4+/5  Hip Extension: R 4/5; L 4+/5            Hip ER: R 4/5; L 4+/5  Hip IR: R 4/5; L 4+/5  Knee Flexion: R 4/5; L 4+/5            Knee extension (L3): R 4/5; L 4+/5            DF (L4): R 4/5; L 4+/5  Great Toe Ext (L5): R 4/5, L 4+/5  PF (S1): R 4/5; L 4+/5      Flexibility:   LE   Hip Flexor: R Tighter, L Tight  Hamstrings: R Tighter; L Tight  Piriformis: R Tighter; L Tight  Quads: R Tighter; L Tight  Gastroc-soleus: R Tighter; L Tight      Special tests:   Positive right slump and piriformis tests. Negative at the left and bilateral slr tests.     Gait: pt ambulates on level ground with antalgia and left trunk lean.      Assessment: Symptoms centralized with prone press up exercises.  Issued for home and provided updated HEP handout.    Goals:   (to be met in 10 visits)   .Pt will improve transversus abdominis recruitment to perform proper isometric contraction without requiring verbal or tactile cuing to promote advancement of therex   Pt will demonstrate good understanding of proper posture and body mechanics to decrease pain and improve spinal  safety    Pt will report improved symptom centralization and absence of radicular symptoms for 3 consecutive days to improve function with ADL   Pt will have decreased paraspinal mm tension to tolerate standing >10 minutes for work and home activities   Pt will demonstrate improved core strength to be able to perform walking with <1/10 pain   Pt will be independent and compliant with comprehensive HEP to maintain progress achieved in PT     Plan: Plan to work on stretching, strengthening and trom.  Date: 1/18/2024  TX#: 2/10 Date:  1/22/2024               TX#: 3/10 Date:  1/24/2024            TX#: 4/10 Date: 1/30/2024               TX#: 5/10 Date:   Tx#: 6/    Ther. Ex.: 30'  Nu-Step 8' L4  Right hamstring stretch with pump 2x30\"  Right PFS and Glut. St. 2x30\"xea.  PPT 20x3\"  Supine Marching with skc 20x1\"ea.  SB LB St. 3x30\"  Sitting Right PFS and Glut. St. 2x30\"xea. There ex: 37 min   Nu-Step 8' L4  Right PFS with towel assist in supine 10x 5 sec holds  PPT 20x3\"  SB R/L LTR 10x ea  Prone R/L hip extension 2 x 10 ea  JANETH 2 minutes (centralizes pain)  Neural flossing in sitting for RLE with cervical extension to RLE 10 reps  Education on sitting posture - use pillow/roll & do not sit >30-60 min; body mechanics - avoid bending activities   There ex: 35 min   Supine R/L sciatic nerve glide/floss 5x ea  Hooklying OH latissimus dorsi reach with 8# DB 15 reps  SB R/L LTR 10x ea  Prone R/L hip extension 2 x 10 ea  JANETH 3 minutes  Prone lying 1 minute  Sidelying R/L clams with GTB above knees 10x ea  Bird dog 2 x 10  Standing lumbar extension 10x   There ex: 34 min   Supine R sciatic nerve glide/floss 10x  Prone R/L alternating UE/LE lifts 10x ea  JANETH 3 minutes  Prone press up 2 x 5 reps   Sidelying R/L clams with GTB above knees 10x ea  Bird dog 2 x 10  Standing lumbar extension 10x  Repeat Prone press up 2 x 5 reps  Standing R/L hip extension/hip abduction with RTB at ankles 2 x 10 ea     Neuro Re-ed.: 15'  LAQs  20xea.  Sitting Fbs 2x10 Manual tx: 8 minutes  Prone lumbar spine mobilizations grade 2 x 4 minutes  STM/foam roll to R glut/piriformis x 4 minutes Manual tx: 10 minutes  Prone lumbar spine mobilizations grade 2-3 & STM lumbar paraspinals  Manual tx: 10 minutes  Prone lumbar spine mobilizations grade 2-3 & STM lumbar paraspinals     Modalities  Heat pad to low back in prone  x 10 minutes Heat pad to low back in prone  x 10 minutes    HEP: updated - see pt instructions section    Charges: 2 TE and  Man 1      Total Timed Treatment: 44 min  Total Treatment Time: 54 min

## 2024-02-26 NOTE — PROGRESS NOTES
Subjective:   Keysha Echevarria is a 54 year old female who presents for Cold (Pt states she has ear pain, sore throat ,nasal congestion all on the right side of her face. Pt had Covid in January and has been on antibiotics twice with little improvement . )   Patient is a pleasant 54-year-old female past medical history consistent for anxiety, depression, obesity, heart murmur, hypothyroidism.  Patient presents to office today for follow-up evaluation of her throat and ear.  Patient states she still has residual symptoms. Has soreness in throat and congestion in head. NO more cough no more chest tightness.     Of note patient seen by myself telehealth 1/8/24 albuterol and tessalon given. Patient also saw MD Monge on 1/25/24 for Aortic valve stenosis and shortness of breath after she received echo. MD Monge ordered a CXR to be conducted in 2 weeks for post PNA follow up but it was completed that day. She was prescribed a Zpack and took. She went to [place], still did not feel well was given a second abx of zpack took for 3 days.     CXR 1/25:  Mild patchy alveolitis lateral segment right middle lobe.  Suspect mild pneumonitis. She had covid earlier in the month seen by me via telehealth.     Stress test on march 4.         Past Medical History:   Diagnosis Date    Anxiety state     Depression     Obesity       Past Surgical History:   Procedure Laterality Date    Colonoscopy N/A 11/19/2020    Procedure: COLONOSCOPY;  Surgeon: Ignacio Wolf MD;  Location: [facility] ENDOSCOPY    Hysterectomy  2009    [facility] for heavy menses, anemia    Lipoma removal Left 2005    Lipoma removal of left flank    Needle biopsy left  05/15/2019    US bx    Needle biopsy right  05/2017    benign outside breast bx [place]    Other surgical history  05/01/2017    right breast biopsy         History/Other:    Chief Complaint Reviewed and Verified  Nursing Notes Reviewed and   Verified  Tobacco Reviewed  Allergies Reviewed   Medications Reviewed    Problem List Reviewed  Medical History Reviewed  Surgical History   Reviewed  OB Status Reviewed  Family History Reviewed         Tobacco:  She smoked tobacco in the past but quit greater than 12 months ago.  Social History    Tobacco Use      Smoking status: Former        Types: Cigarettes        Quit date: 1/3/2023        Years since quittin.1      Smokeless tobacco: Never       Current Outpatient Medications   Medication Sig Dispense Refill    methylPREDNISolone (MEDROL) 4 MG Oral Tablet Therapy Pack As directed. 21 each 0    ibuprofen 600 MG Oral Tab Take 1 tablet (600 mg total) by mouth every 6 (six) hours as needed for Pain. Always take it with food. 60 tablet 0    levothyroxine 50 MCG Oral Tab Take 1 tablet (50 mcg total) by mouth before breakfast. 90 tablet 1    cyclobenzaprine 5 MG Oral Tab Take 1 tablet (5 mg total) by mouth nightly as needed for Muscle spasms. 30 tablet 0    Halobetasol Propionate 0.05 % External Ointment Apply 1 g topically 2 (two) times daily. Can apply to the hands along with the eczema as needed for flareups only. 15 g 0    hydrocortisone 0.5 % External Cream Apply 1 Application topically 2 (two) times daily. To the upper eyelids when they are swollen and itchy.  Apply a very thin amount and only for flareups. 15 g 0    ketoconazole 2 % External Shampoo Apply to scalp and behind the ears 2-3 times per week as needed. 120 mL 2    valACYclovir 1 G Oral Tab Take 2 tablets (2,000 mg total) by mouth every 12 (twelve) hours. For a total of 2 doses. (Patient not taking: Reported on 2023) 8 tablet 3    Phentermine HCl 15 MG Oral Cap Take 1 capsule (15 mg total) by mouth every morning. For weight loss (Patient not taking: Reported on 2023) 90 capsule 1    tacrolimus (PROTOPIC) 0.1 % External Ointment Apply 1 Application. topically 2 (two) times daily. (Patient not taking: Reported on 2023) 60 g 3         Review of Systems:  Review of Systems    Constitutional: Negative.  Negative for activity change, appetite change and fever.   HENT:  Positive for congestion and hearing loss. Negative for postnasal drip, rhinorrhea, sore throat, tinnitus and voice change.    Eyes: Negative.    Respiratory: Negative.  Negative for apnea, cough, chest tightness and shortness of breath.    Cardiovascular:  Negative for chest pain and leg swelling.   Gastrointestinal: Negative.  Negative for abdominal pain, anal bleeding and blood in stool.   Genitourinary: Negative.  Negative for difficulty urinating, flank pain and menstrual problem.   Musculoskeletal: Negative.  Negative for joint swelling.   Skin: Negative.    Neurological: Negative.  Negative for dizziness and headaches.   Psychiatric/Behavioral: Negative.  Negative for agitation.          Objective:   /80   Pulse 65   Wt 161 lb (73 kg)   BMI 29.45 kg/m²  Estimated body mass index is 29.45 kg/m² as calculated from the following:    Height as of 1/25/24: 5' 2\" (1.575 m).    Weight as of this encounter: 161 lb (73 kg).  Physical Exam  Vitals and nursing note reviewed.   Constitutional:       Appearance: Normal appearance. She is normal weight.   HENT:      Head: Normocephalic.      Right Ear: Tympanic membrane, ear canal and external ear normal.      Left Ear: Tympanic membrane, ear canal and external ear normal.      Ears:      Lopez exam findings: Lateralizes right.     Right Rinne: AC > BC.     Left Rinne: AC > BC.     Nose: Congestion present.      Mouth/Throat:      Pharynx: Posterior oropharyngeal erythema present. No oropharyngeal exudate.      Tonsils: 3+ on the right. 3+ on the left.   Cardiovascular:      Rate and Rhythm: Normal rate and regular rhythm.      Pulses: Normal pulses.      Heart sounds: Murmur heard.   Pulmonary:      Effort: Pulmonary effort is normal. No respiratory distress.      Breath sounds: Normal breath sounds. No stridor. No wheezing, rhonchi or rales.   Chest:      Chest wall: No  tenderness.   Abdominal:      General: Abdomen is flat.   Musculoskeletal:         General: Normal range of motion.      Cervical back: Normal range of motion and neck supple.   Skin:     General: Skin is warm and dry.      Capillary Refill: Capillary refill takes less than 2 seconds.   Neurological:      Mental Status: She is alert and oriented to person, place, and time.   Psychiatric:         Mood and Affect: Mood normal.           Assessment & Plan:   1. Positive self-administered antigen test for COVID-19 (Primary)  Assessment & Plan:  partially resolved  Medrol Dosepak  Follow-up in 2 weeks  2. Congestion of upper respiratory tract  Assessment & Plan:  Patient is status post COVID infection from early January.  Patient was found to have a right middle lobe pneumonitis on chest x-ray performed on 1/25/2024.  Patient received treatment with Z-Teddy x 1 and an additional Z-Teddy pack 1/2 a pack to in [place].  Patient reports cough and chest tightness have since resolved.  However has pressure in her ears and feels like she is underwater.  Reports tinnitus to right side, this has happened in the past with viral upper respiratory infection, Lopez to the right air conduction is greater than bone conduction.  Medrol Dosepak  Follow-up 2 weeks  Consider referral to ENT if tinnitus remains.  Orders:  -     methylPREDNISolone; As directed.  Dispense: 21 each; Refill: 0  3. Tinnitus of right ear  Assessment & Plan:  Medrol Dosepak  This has happened in the past with viral upper respiratory infections  If tinnitus remains, consider adding secondary antibiotic and or referring to ENT.  Orders:  -     methylPREDNISolone; As directed.  Dispense: 21 each; Refill: 0  4. Aortic valve stenosis, mild  Assessment & Plan:  Echocardiogram completion  Chest x-ray completed  Referral scheduled for cardiology 3/4/24    Patient aware of plan of care. All questions answered to satisfaction of the patient. Patient instructed to call office or  reach out via [employer] if any issues arise. For urgent issues and/or reviewed red flags please proceed to the urgent care or ER.  Also, inform the nurse practitioner with any new symptoms or medication side effects.              Return in about 2 weeks (around 3/12/2024).    MIKE Wasserman, 2/26/2024, 4:06 PM

## 2024-02-27 ENCOUNTER — OFFICE VISIT (OUTPATIENT)
Dept: FAMILY MEDICINE CLINIC | Facility: CLINIC | Age: 54
End: 2024-02-27

## 2024-02-27 ENCOUNTER — LAB ENCOUNTER (OUTPATIENT)
Dept: LAB | Age: 54
End: 2024-02-27
Attending: FAMILY MEDICINE
Payer: MEDICAID

## 2024-02-27 VITALS
DIASTOLIC BLOOD PRESSURE: 80 MMHG | HEART RATE: 65 BPM | BODY MASS INDEX: 29 KG/M2 | WEIGHT: 161 LBS | SYSTOLIC BLOOD PRESSURE: 120 MMHG

## 2024-02-27 DIAGNOSIS — U07.1 POSITIVE SELF-ADMINISTERED ANTIGEN TEST FOR COVID-19: Primary | ICD-10-CM

## 2024-02-27 DIAGNOSIS — E03.9 ACQUIRED HYPOTHYROIDISM: ICD-10-CM

## 2024-02-27 DIAGNOSIS — J39.8 CONGESTION OF UPPER RESPIRATORY TRACT: ICD-10-CM

## 2024-02-27 DIAGNOSIS — H93.11 TINNITUS OF RIGHT EAR: ICD-10-CM

## 2024-02-27 DIAGNOSIS — I35.0 AORTIC VALVE STENOSIS, MILD: ICD-10-CM

## 2024-02-27 LAB — TSI SER-ACNC: 1.41 MIU/ML (ref 0.55–4.78)

## 2024-02-27 PROCEDURE — 36415 COLL VENOUS BLD VENIPUNCTURE: CPT

## 2024-02-27 PROCEDURE — 84443 ASSAY THYROID STIM HORMONE: CPT

## 2024-02-27 PROCEDURE — 99214 OFFICE O/P EST MOD 30 MIN: CPT

## 2024-02-27 RX ORDER — METHYLPREDNISOLONE 4 MG/1
TABLET ORAL
Qty: 21 EACH | Refills: 0 | Status: SHIPPED | OUTPATIENT
Start: 2024-02-27

## 2024-02-27 NOTE — ASSESSMENT & PLAN NOTE
Medrol Dosepak  This has happened in the past with viral upper respiratory infections  If tinnitus remains, consider adding secondary antibiotic and or referring to ENT.

## 2024-02-27 NOTE — ASSESSMENT & PLAN NOTE
Echocardiogram completion  Chest x-ray completed  Referral scheduled for cardiology 3/4/24    Patient aware of plan of care. All questions answered to satisfaction of the patient. Patient instructed to call office or reach out via [employer] if any issues arise. For urgent issues and/or reviewed red flags please proceed to the urgent care or ER.  Also, inform the nurse practitioner with any new symptoms or medication side effects.

## 2024-02-27 NOTE — ASSESSMENT & PLAN NOTE
Patient is status post COVID infection from early January.  Patient was found to have a right middle lobe pneumonitis on chest x-ray performed on 1/25/2024.  Patient received treatment with Z-Teddy x 1 and an additional Z-Teddy pack 1/2 a pack to in [place].  Patient reports cough and chest tightness have since resolved.  However has pressure in her ears and feels like she is underwater.  Reports tinnitus to right side, this has happened in the past with viral upper respiratory infection, Lopez to the right air conduction is greater than bone conduction.  Medrol Dosepak  Follow-up 2 weeks  Consider referral to ENT if tinnitus remains.

## 2024-03-18 ENCOUNTER — ORDER TRANSCRIPTION (OUTPATIENT)
Dept: ADMINISTRATIVE | Facility: HOSPITAL | Age: 54
End: 2024-03-18

## 2024-03-18 DIAGNOSIS — R06.02 SHORTNESS OF BREATH: Primary | ICD-10-CM

## 2024-03-19 ENCOUNTER — OFFICE VISIT (OUTPATIENT)
Dept: FAMILY MEDICINE CLINIC | Facility: CLINIC | Age: 54
End: 2024-03-19

## 2024-03-19 VITALS
SYSTOLIC BLOOD PRESSURE: 113 MMHG | BODY MASS INDEX: 29.74 KG/M2 | WEIGHT: 161.63 LBS | TEMPERATURE: 97 F | HEIGHT: 62 IN | HEART RATE: 85 BPM | DIASTOLIC BLOOD PRESSURE: 80 MMHG

## 2024-03-19 DIAGNOSIS — I35.0 AORTIC VALVE STENOSIS, MILD: Primary | ICD-10-CM

## 2024-03-19 DIAGNOSIS — H93.11 TINNITUS, RIGHT: ICD-10-CM

## 2024-03-19 DIAGNOSIS — E03.9 ACQUIRED HYPOTHYROIDISM: ICD-10-CM

## 2024-03-19 DIAGNOSIS — E66.09 NON MORBID OBESITY DUE TO EXCESS CALORIES: ICD-10-CM

## 2024-03-19 PROCEDURE — 99214 OFFICE O/P EST MOD 30 MIN: CPT | Performed by: FAMILY MEDICINE

## 2024-03-19 RX ORDER — METOPROLOL TARTRATE 100 MG/1
TABLET ORAL
COMMUNITY
Start: 2024-03-11

## 2024-03-19 RX ORDER — MELOXICAM 15 MG/1
TABLET ORAL
COMMUNITY
Start: 2024-01-31

## 2024-03-19 NOTE — PROGRESS NOTES
Patient ID: Keysha Echevarria is a 54 year old female.    HPI  Chief Complaint   Patient presents with    Follow - Up     Per cardiologist ok to start/continue phentermine      Last seen by me on 01/25/2024.    Pt recently saw Dr. Darling, cardiologist and started taking Phentermine HCl 15 mg for weight loss as he approved her to take it.. She was 158 lbs in January 2024 and is 161 lbs today and when she gains weight she does get a bit upset and frustrated as she does try to exercise as well.. I reviewed her echocardiogram from January 2024. She has an order for a CT angiogram. Pt states that she was prescribed metoprolol tartrate 100 mg per Dr. Darling, cardiologist to take before her CT angiogram and then stop after the procedure. She is also taking levothyroxine 50 mcg for her thyroid.     Pt saw MIKE Wasserman on 02/27/24 for clogged ears. She was prescribed prednisone and took it with relief. Pt states that she has tinnitus in the right ear for years now and knows that this is a chronic issue.  She wanted me to look in your ears.. I discussed this with her.       Wt Readings from Last 6 Encounters:   03/19/24 161 lb 9.6 oz   02/27/24 161 lb   01/25/24 158 lb   12/19/23 156 lb 6.4 oz   11/20/23 157 lb   08/15/23 157 lb 9.6 oz       BMI Readings from Last 6 Encounters:   03/19/24 29.56 kg/m²   02/27/24 29.45 kg/m²   01/25/24 28.90 kg/m²   12/19/23 28.61 kg/m²   11/20/23 28.72 kg/m²   08/15/23 28.83 kg/m²       BP Readings from Last 6 Encounters:   03/19/24 113/80   02/27/24 120/80   01/25/24 118/81   12/19/23 136/83   11/20/23 127/85   08/15/23 129/83         Review of Systems   HENT:  Positive for tinnitus.    Respiratory:  Negative for shortness of breath.    Cardiovascular:  Negative for chest pain.           Medical History:      Past Medical History:   Diagnosis Date    Anxiety state     Depression     Obesity        Past Surgical History:   Procedure Laterality Date    COLONOSCOPY N/A  11/19/2020    Procedure: COLONOSCOPY;  Surgeon: Ignacio Wolf MD;  Location: [facility] ENDOSCOPY    HYSTERECTOMY  2009    [facility] for heavy menses, anemia    LIPOMA REMOVAL Left 2005    Lipoma removal of left flank    NEEDLE BIOPSY LEFT  05/15/2019    US bx    NEEDLE BIOPSY RIGHT  05/2017    benign outside breast bx natalia    OTHER SURGICAL HISTORY  05/01/2017    right breast biopsy           Current Outpatient Medications   Medication Sig Dispense Refill    Meloxicam 15 MG Oral Tab Meloxicam 15 MG Oral Tab, [RxNorm: 115984]      ibuprofen 600 MG Oral Tab Take 1 tablet (600 mg total) by mouth every 6 (six) hours as needed for Pain. Always take it with food. 60 tablet 0    levothyroxine 50 MCG Oral Tab Take 1 tablet (50 mcg total) by mouth before breakfast. 90 tablet 1    cyclobenzaprine 5 MG Oral Tab Take 1 tablet (5 mg total) by mouth nightly as needed for Muscle spasms. 30 tablet 0    valACYclovir 1 G Oral Tab Take 2 tablets (2,000 mg total) by mouth every 12 (twelve) hours. For a total of 2 doses. 8 tablet 3    Phentermine HCl 15 MG Oral Cap Take 1 capsule (15 mg total) by mouth every morning. For weight loss 90 capsule 1    Halobetasol Propionate 0.05 % External Ointment Apply 1 g topically 2 (two) times daily. Can apply to the hands along with the eczema as needed for flareups only. 15 g 0    hydrocortisone 0.5 % External Cream Apply 1 Application topically 2 (two) times daily. To the upper eyelids when they are swollen and itchy.  Apply a very thin amount and only for flareups. 15 g 0    ketoconazole 2 % External Shampoo Apply to scalp and behind the ears 2-3 times per week as needed. 120 mL 2    tacrolimus (PROTOPIC) 0.1 % External Ointment Apply 1 Application. topically 2 (two) times daily. 60 g 3    metoprolol tartrate 100 MG Oral Tab metoprolol tartrate 100 mg tablet, [RxNorm: 783464] (Patient not taking: Reported on 3/19/2024)       Allergies:  Allergies   Allergen Reactions    Penicillins OTHER (SEE COMMENTS)      Since a child        Physical Exam:       Physical Exam  Blood pressure 113/80, pulse 85, temperature 97.2 °F (36.2 °C), height 5' 2\" (1.575 m), weight 161 lb 9.6 oz, not currently breastfeeding.      Physical Exam   Constitutional: Patient is oriented to person, place, and time. Patient appears well-developed and well-nourished. No distress.   Head: Normocephalic.   Right Ear: Tympanic membrane, external ear and ear canal normal.   Left Ear: Tympanic membrane, external ear and ear canal normal.   Throat: Oropharynx is clear without exudate   Eyes: Conjunctivae and EOM are normal.   Neck: Normal range of motion. No thyromegaly present.   Cardiovascular: Normal rate, regular rhythm and normal heart sounds.    Pulmonary/Chest: Effort normal and breath sounds normal. No respiratory distress.   Lymphadenopathy: Patient has no cervical adenopathy.  Neurological: Patient is alert and oriented to person, place, and time.   Skin: Skin is warm.   Psychiatry: Normal mood and affect.  Lower legs: No edema of the legs bilaterally.      Vitals reviewed.           Assessment/Plan:      Diagnoses and all orders for this visit:    Aortic valve stenosis, mild  She did see the cardiologist and has a another test to get done.  Otherwise she has been approved to restart the phentermine and has some at home.  She took a tablet already and has no issues with that.  Acquired hypothyroidism  Compliant with her thyroid medication which helps  Non morbid obesity due to excess calories  On phentermine  Tinnitus, right  The clogged ears have resolved.  The tinnitus comes and goes but is in the right ear.      Referrals (if applicable)  No orders of the defined types were placed in this encounter.      Follow up if symptoms persist.  Take medicine (if given) as prescribed.  Approach to treatment discussed and patient/family member understands and agrees to plan.     No follow-ups on file.    There are no Patient Instructions on file for this  visit.    Dagoberto Pruitt    3/19/2024    By signing my name below, I, Dagoberto Pruitt,  attest that this documentation has been prepared under the direction and in the presence of Evans Monge DO.   Electronically Signed: Dagoberto Pruitt, 3/19/2024, 11:14 AM.    I, Evans Monge DO,  personally performed the services described in this documentation. All medical record entries made by the scribe were at my direction and in my presence.  I have reviewed the chart and discharge instructions (if applicable) and agree that the record reflects my personal performance and is accurate and complete.  Evans Monge DO, 3/19/2024, 12:40 PM

## 2024-03-21 ENCOUNTER — TELEPHONE (OUTPATIENT)
Dept: DERMATOLOGY CLINIC | Facility: CLINIC | Age: 54
End: 2024-03-21

## 2024-03-21 PROBLEM — J06.9 VIRAL UPPER RESPIRATORY TRACT INFECTION: Status: RESOLVED | Noted: 2023-08-15 | Resolved: 2024-03-21

## 2024-03-21 NOTE — TELEPHONE ENCOUNTER
Patient would like to speak to someone state it started it yesterday her face in swollen ; itchy ; red ; taking claritin every 12 hours no improvement. Please advise.

## 2024-03-21 NOTE — TELEPHONE ENCOUNTER
Pt states she purchased a beauty cream from a woman and used it and it has CBD oil and cream in it and she states her face became swollen and her face became red and itchy.  Pt taking claritin bid and states it is helping a little but is asking what else she can use?  Pt will send photos.  Pt advised to stop the cream and to wash face and pillow case since she slept with the cream on her face.  Await photos.

## 2024-03-22 ENCOUNTER — OFFICE VISIT (OUTPATIENT)
Dept: FAMILY MEDICINE CLINIC | Facility: CLINIC | Age: 54
End: 2024-03-22

## 2024-03-22 ENCOUNTER — NURSE TRIAGE (OUTPATIENT)
Dept: FAMILY MEDICINE CLINIC | Facility: CLINIC | Age: 54
End: 2024-03-22

## 2024-03-22 VITALS
BODY MASS INDEX: 29.56 KG/M2 | DIASTOLIC BLOOD PRESSURE: 82 MMHG | HEIGHT: 62 IN | SYSTOLIC BLOOD PRESSURE: 116 MMHG | WEIGHT: 160.63 LBS | HEART RATE: 75 BPM

## 2024-03-22 DIAGNOSIS — L24.3 IRRITANT CONTACT DERMATITIS DUE TO COSMETICS: Primary | ICD-10-CM

## 2024-03-22 DIAGNOSIS — L29.9 ITCHING: ICD-10-CM

## 2024-03-22 PROCEDURE — 99213 OFFICE O/P EST LOW 20 MIN: CPT

## 2024-03-22 RX ORDER — CETIRIZINE HYDROCHLORIDE 10 MG/1
10 TABLET ORAL DAILY
Qty: 30 TABLET | Refills: 0 | Status: SHIPPED | OUTPATIENT
Start: 2024-03-22

## 2024-03-22 NOTE — ASSESSMENT & PLAN NOTE
Benadryl as needed at night  Start zyrtec.     Patient aware of plan of care. All questions answered to satisfaction of the patient. Patient instructed to call office or reach out via [employer] if any issues arise. For urgent issues and/or reviewed red flags please proceed to the urgent care or ER.  Also, inform the nurse practitioner with any new symptoms or medication side effects.

## 2024-03-22 NOTE — ASSESSMENT & PLAN NOTE
Related to night eye cream.  Has been resolving  Educated on benadryl OTC  Start cetrizine 10 mg day   Can use vaseline as barrier cream .

## 2024-03-22 NOTE — PROGRESS NOTES
Subjective:   Keysha Echevarria is a 54 year old female who presents for Eye Problem (Both Eye lid swelling more on left side, top of eye is dry ) and Skin (Dry skin, swelled up , red, itchiness, used eternal spirit beauty creams  )   Patient is a pleasant 54-year-old female past medical history consistent for anxiety, depression, obesity, heart murmur, hypothyroidism.  Patient presents to office today for evaluation of eyelid swelling and reaction to facial cream. Patient has using a \" beauty night cream\" and thinks she had an allergic reaction and applied to her face on Tuesday night .  ON Wednesday has facial edema, redness, itching, eyelids are swollen , she spoke with Dermatology  and was told to take Claritin.  Patient states in office today she put it on at night Tuesday woke up Wednesday. Face swollen, red and itchy. She washed her face. She went to pharmacy, bought claritin.         Past Medical History:   Diagnosis Date    Anxiety state     Depression     Obesity       Past Surgical History:   Procedure Laterality Date    Colonoscopy N/A 11/19/2020    Procedure: COLONOSCOPY;  Surgeon: Ignacio Wolf MD;  Location: [facility] ENDOSCOPY    Hysterectomy  2009    [facility] for heavy menses, anemia    Lipoma removal Left 2005    Lipoma removal of left flank    Needle biopsy left  05/15/2019    US bx    Needle biopsy right  05/2017    benign outside breast bx [facility]    Other surgical history  05/01/2017    right breast biopsy         History/Other:    Chief Complaint Reviewed and Verified  Nursing Notes Reviewed and   Verified  Tobacco Reviewed  Allergies Reviewed  Medications Reviewed    Problem List Reviewed  Medical History Reviewed  Surgical History   Reviewed  OB Status Reviewed  Family History Reviewed  Social History   Reviewed         Tobacco:  She smoked tobacco in the past but quit greater than 12 months ago.  Social History    Tobacco Use      Smoking status: Former        Types:  Cigarettes        Quit date: 1/3/2023        Years since quittin.2      Smokeless tobacco: Never       Current Outpatient Medications   Medication Sig Dispense Refill    cetirizine 10 MG Oral Tab Take 1 tablet (10 mg total) by mouth daily. 30 tablet 0    Meloxicam 15 MG Oral Tab Meloxicam 15 MG Oral Tab, [RxNorm: 088616]      ibuprofen 600 MG Oral Tab Take 1 tablet (600 mg total) by mouth every 6 (six) hours as needed for Pain. Always take it with food. 60 tablet 0    levothyroxine 50 MCG Oral Tab Take 1 tablet (50 mcg total) by mouth before breakfast. 90 tablet 1    cyclobenzaprine 5 MG Oral Tab Take 1 tablet (5 mg total) by mouth nightly as needed for Muscle spasms. 30 tablet 0    valACYclovir 1 G Oral Tab Take 2 tablets (2,000 mg total) by mouth every 12 (twelve) hours. For a total of 2 doses. 8 tablet 3    Phentermine HCl 15 MG Oral Cap Take 1 capsule (15 mg total) by mouth every morning. For weight loss 90 capsule 1    Halobetasol Propionate 0.05 % External Ointment Apply 1 g topically 2 (two) times daily. Can apply to the hands along with the eczema as needed for flareups only. 15 g 0    hydrocortisone 0.5 % External Cream Apply 1 Application topically 2 (two) times daily. To the upper eyelids when they are swollen and itchy.  Apply a very thin amount and only for flareups. 15 g 0    ketoconazole 2 % External Shampoo Apply to scalp and behind the ears 2-3 times per week as needed. 120 mL 2    tacrolimus (PROTOPIC) 0.1 % External Ointment Apply 1 Application. topically 2 (two) times daily. 60 g 3    metoprolol tartrate 100 MG Oral Tab metoprolol tartrate 100 mg tablet, [RxNorm: 313929] (Patient not taking: Reported on 3/19/2024)           Review of Systems:  Review of Systems   Constitutional: Negative.  Negative for activity change and appetite change.   HENT: Negative.  Negative for congestion, postnasal drip, rhinorrhea, sore throat, tinnitus and voice change.    Eyes: Negative.    Respiratory: Negative.   Negative for apnea, cough, chest tightness and shortness of breath.    Cardiovascular:  Negative for chest pain and leg swelling.   Gastrointestinal: Negative.  Negative for abdominal pain, anal bleeding and blood in stool.   Genitourinary: Negative.  Negative for difficulty urinating, flank pain and menstrual problem.   Musculoskeletal: Negative.  Negative for joint swelling.   Skin:  Positive for color change and rash.   Neurological: Negative.  Negative for dizziness and headaches.   Psychiatric/Behavioral: Negative.  Negative for agitation.          Objective:   /82 (BP Location: Left arm, Patient Position: Sitting, Cuff Size: adult)   Pulse 75   Ht 5' 2\" (1.575 m)   Wt 160 lb 9.6 oz (72.8 kg)   BMI 29.37 kg/m²  Estimated body mass index is 29.37 kg/m² as calculated from the following:    Height as of this encounter: 5' 2\" (1.575 m).    Weight as of this encounter: 160 lb 9.6 oz (72.8 kg).  Physical Exam  Vitals and nursing note reviewed.   Constitutional:       Appearance: Normal appearance. She is normal weight.   Skin:     General: Skin is warm.      Coloration: Skin is not jaundiced or pale.      Findings: Erythema and rash present. No lesion.   Neurological:      Mental Status: She is alert.           Assessment & Plan:   1. Irritant contact dermatitis due to cosmetics (Primary)  Assessment & Plan:  Related to night eye cream.  Has been resolving  Educated on benadryl OTC  Start cetrizine 10 mg day   Can use vaseline as barrier cream .  Orders:  -     Cetirizine HCl; Take 1 tablet (10 mg total) by mouth daily.  Dispense: 30 tablet; Refill: 0  2. Itching  Assessment & Plan:  Benadryl as needed at night  Start zyrtec.     Patient aware of plan of care. All questions answered to satisfaction of the patient. Patient instructed to call office or reach out via [employer] if any issues arise. For urgent issues and/or reviewed red flags please proceed to the urgent care or ER.  Also, inform the nurse  practitioner with any new symptoms or medication side effects.        Orders:  -     Cetirizine HCl; Take 1 tablet (10 mg total) by mouth daily.  Dispense: 30 tablet; Refill: 0        Return if symptoms worsen or fail to improve.    MIKE Wasserman, 3/22/2024, 10:27 AM

## 2024-03-22 NOTE — TELEPHONE ENCOUNTER
Action Requested: Summary for Provider     []  Critical Lab, Recommendations Needed  [] Need Additional Advice  []   FYI    []   Need Orders  [] Need Medications Sent to Pharmacy  []  Other     SUMMARY: Per protocol advised : Office visit   Future Appointments   Date Time Provider Department Center   3/22/2024  1:40 PM Josse Lai APRN [facility]   3/27/2024  8:00 AM [facility] RM[facility] CARD [facility]   2024 11:30 AM Evans Monge DO [facility]           Reason for call: Eye Problem  Onset: Data Unavailable    Patient calling ( identified name and  ) states LOV 3/19/2024  Patient has using a \" beauty night cream\" and thinks she had an allergic reaction and applied to her face on Tuesday night .  ON Wednesday has facial edema, redness, itching, eyelids are swollen , she spoke with Dermatology  and was told to take Claritin    She has taken the Claritin with slight relief , concerned that eye lids are still swollen and are \"itchy\"     Care Advice             Patient/Caregiver understands and will follow care advice?: Yes, plans to follow advice                        SEE IN OFFICE TODAY OR TOMORROW:     LOCAL COLD:  * Apply a cool, wet washcloth to the area for 20 minutes.  * You can repeat this every hour as needed.    WASH FACE:  * Wash the face, then the eyelids, with a mild soap and water.  * This will remove any allergic or irritating substance (e.g., hair dye, nail polish).                 Patient verbalizes understanding and agrees with plan.         Reason for Disposition   MILD eyelid swelling (puffiness) and sinus pain or pressure    Protocols used: Eye - Swelling-A-OH

## 2024-03-23 DIAGNOSIS — L29.9 ITCHING: ICD-10-CM

## 2024-03-23 DIAGNOSIS — L24.3 IRRITANT CONTACT DERMATITIS DUE TO COSMETICS: ICD-10-CM

## 2024-03-23 NOTE — TELEPHONE ENCOUNTER
Pharmacy requesting refill    Current Outpatient Medications   Medication Sig Dispense Refill    cetirizine 10 MG Oral Tab Take 1 tablet (10 mg total) by mouth daily. 30 tablet 0

## 2024-03-25 RX ORDER — CETIRIZINE HYDROCHLORIDE 10 MG/1
10 TABLET ORAL DAILY
Qty: 30 TABLET | Refills: 0 | OUTPATIENT
Start: 2024-03-25

## 2024-03-27 ENCOUNTER — HOSPITAL ENCOUNTER (OUTPATIENT)
Dept: CT IMAGING | Facility: HOSPITAL | Age: 54
Discharge: HOME | End: 2024-03-27
Attending: INTERNAL MEDICINE
Payer: MEDICAID

## 2024-03-27 VITALS
DIASTOLIC BLOOD PRESSURE: 80 MMHG | BODY MASS INDEX: 29.44 KG/M2 | SYSTOLIC BLOOD PRESSURE: 127 MMHG | HEIGHT: 62 IN | RESPIRATION RATE: 11 BRPM | HEART RATE: 49 BPM | WEIGHT: 160 LBS

## 2024-03-27 DIAGNOSIS — R06.02 SHORTNESS OF BREATH: ICD-10-CM

## 2024-03-27 LAB
CREAT BLD-MCNC: 0.7 MG/DL
EGFRCR SERPLBLD CKD-EPI 2021: 103 ML/MIN/1.73M2 (ref 60–?)

## 2024-03-27 PROCEDURE — 82565 ASSAY OF CREATININE: CPT

## 2024-03-27 PROCEDURE — 75574 CT ANGIO HRT W/3D IMAGE: CPT | Performed by: INTERNAL MEDICINE

## 2024-03-27 RX ORDER — LORATADINE 10 MG/1
10 TABLET ORAL 2 TIMES DAILY PRN
COMMUNITY

## 2024-03-27 RX ORDER — DILTIAZEM HYDROCHLORIDE 5 MG/ML
5 INJECTION INTRAVENOUS SEE ADMIN INSTRUCTIONS
Status: DISCONTINUED | OUTPATIENT
Start: 2024-03-27 | End: 2024-03-29

## 2024-03-27 RX ORDER — NITROGLYCERIN 0.4 MG/1
0.4 TABLET SUBLINGUAL ONCE
Status: COMPLETED | OUTPATIENT
Start: 2024-03-27 | End: 2024-03-27

## 2024-03-27 RX ORDER — METOPROLOL TARTRATE 1 MG/ML
5 INJECTION, SOLUTION INTRAVENOUS SEE ADMIN INSTRUCTIONS
Status: DISCONTINUED | OUTPATIENT
Start: 2024-03-27 | End: 2024-03-29

## 2024-03-27 RX ADMIN — NITROGLYCERIN 0.4 MG: 0.4 TABLET SUBLINGUAL at 09:00:00

## 2024-03-27 NOTE — IMAGING NOTE
TO RAD HOLDING AT 0758      HX TAKEN: SHORTNESS OF BREATH WHILE EATING, NOT WITH EXERTION. HAD STRESS IMAGING, WAS TOLD CALCIFICATIONS IN BREAST MADE IT DIFFICULT TO READ    PT CONSENTED AT 0818     BASELINE VITAL SIGNS: HR 49  /80, BMI 29.3    CTA ORDERED BY MATTY KUMAR MD; WAS PT GIVEN CTA PREMEDS NO, IF YES METOPROLOL 100 MG X 2, AM DOSE 0700    18 GAUGE IV STARTED AT 0841, POC TESTING COMPLETED GFR = 103   CREATINE = 0.7    TO CT TABLE @ 0858    CONNECT TO MONITOR  HR 53 /8 AT 0859    NITROGLYCERIN 0.4 MILLIGRAMS SUBLINGUAL GIVEN AT 0900     CALCIUM SCORE COMPLETED AT 0904     INJECTION STARTED AT 0906, HR 56 DURING SCAN, PROCEDURE COMPLETE    POST SCAN HR 62 /84 AT 0909, NOTED SL NTG PARTIALLY DISSOLVED, SOME ON PT'S TONGUE, REMOVED RESIDUAL     PT TO HOLDING AREA  VS HR 52 /76 AT 0916     AVS  PROVIDED      VS HR 47 /83 AT 0929, ADVISED PT METOPROLOL EFFECT WILL LAST 12 HRS, MOVE CAUTIOUSLY DUE TO LOW HR; PT WITHOUT SYMPTOMS    0936 DISCHARGED HOME

## 2024-04-04 NOTE — TELEPHONE ENCOUNTER
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  We did not think T-cell clonality testing was needed, but a CT chest to r/o thymoma (does not appear his current imaging includes the upper thoracic area) was recommended, and we would like to get a parvovirus B19 DNA testing (only serology was pending).  Given his transfusion dependence, we also recommended empiric therapy and felt that a course of prednisone may be easier than cyclosporine, as the latter may have increased risks of toxicity.       Diagnostics:  - follow up karyotype  - parvovirus DNA based assay  - CT chest - r/o thymoma  Treatment:  - start prednisone 1mg/kg - minimum two weeks to try and get good rsponse, then slow taper over 6-8 weeks.  (Dr. Webb's office will manage)  Monitoring:   - per Dr. Webb's office    Will have virtual follow up in 6-8 weeks       Inform pt of approval to [facility] appt.

## 2024-04-08 ENCOUNTER — TELEPHONE (OUTPATIENT)
Dept: DERMATOLOGY CLINIC | Facility: CLINIC | Age: 54
End: 2024-04-08

## 2024-04-08 ENCOUNTER — OFFICE VISIT (OUTPATIENT)
Dept: DERMATOLOGY CLINIC | Facility: CLINIC | Age: 54
End: 2024-04-08

## 2024-04-08 DIAGNOSIS — L20.9 ATOPIC DERMATITIS, UNSPECIFIED TYPE: ICD-10-CM

## 2024-04-08 DIAGNOSIS — H01.119 EYELID DERMATITIS, ALLERGIC/CONTACT: Primary | ICD-10-CM

## 2024-04-08 PROCEDURE — 99213 OFFICE O/P EST LOW 20 MIN: CPT | Performed by: STUDENT IN AN ORGANIZED HEALTH CARE EDUCATION/TRAINING PROGRAM

## 2024-04-08 RX ORDER — TACROLIMUS 1 MG/G
OINTMENT TOPICAL
Qty: 30 G | Refills: 3 | Status: SHIPPED | OUTPATIENT
Start: 2024-04-08

## 2024-04-08 NOTE — TELEPHONE ENCOUNTER
Fax from [facility]    Approved for TACROLIMUS OINTMENT 1/9/24-4/8/25    Placed fax in pa inbox

## 2024-04-08 NOTE — PROGRESS NOTES
April 8, 2024    New patient     Referred by:   No referring provider defined for this encounter.      CHIEF COMPLAINT: Rash     HISTORY OF PRESENT ILLNESS: Keysha Echevarria is a 54 year old female here for evaluation of rash.    Location: Left eyelid  Duration: More than 1 month  Signs and symptoms: face was itchy after using beauty cream then stopped after below rx then left eyelid rash re-occurred. Currently puffy, itchy  Current treatment: Vaseline  Past treatments: Cetrizine; stopped 2 days ago, vaseline    Personal Dermatologic History  History of chronic skin disease: No    Family History  History of chronic skin disease: No  History autoimmune diseases:  No    Past Medical History  Past Medical History:   Diagnosis Date    Anxiety state     Depression     Obesity        REVIEW OF SYSTEMS:  Constitutional: Denies fever, chills, unintentional weight loss.   Skin as per HPI    Medications  Current Outpatient Medications   Medication Sig Dispense Refill    loratadine 10 MG Oral Tab Take 1 tablet (10 mg total) by mouth 2 (two) times daily as needed for Allergies.      cetirizine 10 MG Oral Tab Take 1 tablet (10 mg total) by mouth daily. 30 tablet 0    Meloxicam 15 MG Oral Tab Meloxicam 15 MG Oral Tab, [RxNorm: 018895]      metoprolol tartrate 100 MG Oral Tab Take 1 tablet (100 mg total) by mouth As Directed. Pre-Med CTA, 2 doses      ibuprofen 600 MG Oral Tab Take 1 tablet (600 mg total) by mouth every 6 (six) hours as needed for Pain. Always take it with food. 60 tablet 0    levothyroxine 50 MCG Oral Tab Take 1 tablet (50 mcg total) by mouth before breakfast. 90 tablet 1    cyclobenzaprine 5 MG Oral Tab Take 1 tablet (5 mg total) by mouth nightly as needed for Muscle spasms. 30 tablet 0    valACYclovir 1 G Oral Tab Take 2 tablets (2,000 mg total) by mouth every 12 (twelve) hours. For a total of 2 doses. 8 tablet 3    Phentermine HCl 15 MG Oral Cap Take 1 capsule (15 mg total) by mouth every  morning. For weight loss 90 capsule 1    Halobetasol Propionate 0.05 % External Ointment Apply 1 g topically 2 (two) times daily. Can apply to the hands along with the eczema as needed for flareups only. 15 g 0    hydrocortisone 0.5 % External Cream Apply 1 Application topically 2 (two) times daily. To the upper eyelids when they are swollen and itchy.  Apply a very thin amount and only for flareups. 15 g 0    ketoconazole 2 % External Shampoo Apply to scalp and behind the ears 2-3 times per week as needed. 120 mL 2    tacrolimus (PROTOPIC) 0.1 % External Ointment Apply 1 Application. topically 2 (two) times daily. 60 g 3       PHYSICAL EXAM:  General: awake, alert, no acute distress  Skin: Skin exam was performed today including the following: face. Pertinent findings include:   - clear today    ASSESSMENT & PLAN:  Pathophysiology of diagnoses discussed with patient.  Therapeutic options reviewed. Risks, benefits, and alternatives discussed with patient. Instructions reviewed at length.    #Eyelid dermatitis  - Start protopic 0.01% to affected areas on face twice daily   - This is required as a maintenance, non-steroidal medication to prevent long-term side effects of topical steroids, including permanent atrophy.   - Discussed allergen take 3 months to wash out     Return to clinic: 3 months or sooner if something concerning arises    Venkat Suarez MD

## 2024-04-11 ENCOUNTER — TELEPHONE (OUTPATIENT)
Dept: DERMATOLOGY CLINIC | Facility: CLINIC | Age: 54
End: 2024-04-11

## 2024-04-11 NOTE — TELEPHONE ENCOUNTER
Dr. Suarez - pt states she picked up the tacrolimus ointment yesterday and applied it to eyelids and states it started burning after about 5 minutes and pt had to wash it off.  Pt states it caused her eyelids to turn red and a little swollen/hives on eyelids.  Pt states it is a little better today.  Pt advised to stop using the medication until further notice.  Please advise.  Thank you.

## 2024-04-11 NOTE — TELEPHONE ENCOUNTER
Please recommend continues zyrtecuse. Also if flare from protopic is bad I can send in some prednisone. Hydrocortisone ointment sent.

## 2024-05-13 ENCOUNTER — OFFICE VISIT (OUTPATIENT)
Dept: DERMATOLOGY CLINIC | Facility: CLINIC | Age: 54
End: 2024-05-13

## 2024-05-13 DIAGNOSIS — H01.119 EYELID DERMATITIS, ALLERGIC/CONTACT: Primary | ICD-10-CM

## 2024-05-13 PROCEDURE — 99213 OFFICE O/P EST LOW 20 MIN: CPT | Performed by: STUDENT IN AN ORGANIZED HEALTH CARE EDUCATION/TRAINING PROGRAM

## 2024-05-13 NOTE — PROGRESS NOTES
May 13, 2024    Established patient     Referred by:   No referring provider defined for this encounter.      CHIEF COMPLAINT: Rash     HISTORY OF PRESENT ILLNESS: Keysha Echevarria is a 54 year old female here for evaluation of rash.    Location: bilateral eye lids  Duration: 2 months  Signs and symptoms: red, swollen, painful   Current treatment: vaseline  Past treatments: tacrolimus (caused reaction) zyrtec, hydrocortisone     Personal Dermatologic History  History of chronic skin disease: No    Family History  History of chronic skin disease: No  History autoimmune diseases:  No    Past Medical History  Past Medical History:    Anxiety state    Depression    Obesity       REVIEW OF SYSTEMS:  Constitutional: Denies fever, chills, unintentional weight loss.   Skin as per [facility]    Medications  Current Outpatient Medications   Medication Sig Dispense Refill    hydrocortisone 2.5 % External Ointment Apply to the affected areas on face up to twice daily Monday-Friday with flares. Use for up to 4 weeks 28 g 2    tacrolimus 0.1 % External Ointment Apply to eyelid twice daily 30 g 3    loratadine 10 MG Oral Tab Take 1 tablet (10 mg total) by mouth 2 (two) times daily as needed for Allergies.      cetirizine 10 MG Oral Tab Take 1 tablet (10 mg total) by mouth daily. (Patient not taking: Reported on 4/8/2024) 30 tablet 0    Meloxicam 15 MG Oral Tab Meloxicam 15 MG Oral Tab, [RxNorm: 314229]      metoprolol tartrate 100 MG Oral Tab Take 1 tablet (100 mg total) by mouth As Directed. Pre-Med CTA, 2 doses      ibuprofen 600 MG Oral Tab Take 1 tablet (600 mg total) by mouth every 6 (six) hours as needed for Pain. Always take it with food. 60 tablet 0    levothyroxine 50 MCG Oral Tab Take 1 tablet (50 mcg total) by mouth before breakfast. 90 tablet 1    cyclobenzaprine 5 MG Oral Tab Take 1 tablet (5 mg total) by mouth nightly as needed for Muscle spasms. 30 tablet 0    valACYclovir 1 G Oral Tab Take 2 tablets (2,000  mg total) by mouth every 12 (twelve) hours. For a total of 2 doses. 8 tablet 3    Phentermine HCl 15 MG Oral Cap Take 1 capsule (15 mg total) by mouth every morning. For weight loss 90 capsule 1    Halobetasol Propionate 0.05 % External Ointment Apply 1 g topically 2 (two) times daily. Can apply to the hands along with the eczema as needed for flareups only. 15 g 0    hydrocortisone 0.5 % External Cream Apply 1 Application topically 2 (two) times daily. To the upper eyelids when they are swollen and itchy.  Apply a very thin amount and only for flareups. 15 g 0    ketoconazole 2 % External Shampoo Apply to scalp and behind the ears 2-3 times per week as needed. 120 mL 2    tacrolimus (PROTOPIC) 0.1 % External Ointment Apply 1 Application. topically 2 (two) times daily. 60 g 3       PHYSICAL EXAM:  General: awake, alert, no acute distress  Skin: Skin exam was performed today including the following: eyelids. Pertinent findings include:   - clear today     ASSESSMENT & PLAN:  Pathophysiology of diagnoses discussed with patient.  Therapeutic options reviewed. Risks, benefits, and alternatives discussed with patient. Instructions reviewed at length.    #Eyelid dermatitis  - Recommended holding protopic given hives and itching following use   - Ok to continue vaseline as needed to sites  - IF patient has continues recurrence will plan to patch test. Ok to start re-introducing products one at a time    Return to clinic: 3 months or sooner if something concerning arises    Venkat Suarez MD

## 2024-06-07 DIAGNOSIS — E03.9 ACQUIRED HYPOTHYROIDISM: ICD-10-CM

## 2024-06-11 RX ORDER — LEVOTHYROXINE SODIUM 0.05 MG/1
50 TABLET ORAL
Qty: 90 TABLET | Refills: 3 | Status: SHIPPED | OUTPATIENT
Start: 2024-06-11

## 2024-06-11 NOTE — TELEPHONE ENCOUNTER
REFILL PASSED PER [facility] PROTOCOLS    Requested Prescriptions   Pending Prescriptions Disp Refills    LEVOTHYROXINE 50 MCG Oral Tab [Pharmacy Med Name: LEVOTHYROXINE 0.05MG (50MCG) TAB] 90 tablet 1     Sig: TAKE 1 TABLET(50 MCG) BY MOUTH BEFORE BREAKFAST       Thyroid Medication Protocol Passed - 6/7/2024  1:15 PM        Passed - TSH in past 12 months        Passed - Last TSH value is normal     Lab Results   Component Value Date    TSH 1.406 02/27/2024                 Passed - In person appointment or virtual visit in the past 12 mos or appointment in next 3 mos     Recent Outpatient Visits              4 weeks ago Eyelid dermatitis, allergic/contact    [facility], [place] Michalik, Daniel, MD    Office Visit    2 months ago Eyelid dermatitis, allergic/contact    [facility] [place] Michalik, Daniel, MD    Office Visit    2 months ago [facility]ritant [facility] dermat[facility] cos[facility]    [facility]    2 months ago Aortic valve stenosis, mild    [facility] [place] [facility] [place] [facility]    Office Visit    3 months ago Positive self-administered antigen test for COVID-19    [facility]    [facility]          Future Appointments         Provider Department Appt Notes    In 1 week Evans Monge DO [facility] 3mo follow up                         Future Appointments         Provider Department Appt Notes    In 1 week Evans Monge DO [facility] 3mo follow up          Recent Outpatient Visits              4 weeks ago Eyelid dermatitis, allergic/contact    [facility] [place] Michalik, Daniel, MD    Office Visit    2 months ago Eyelid dermatitis, allergic/contact     [facility] [place], [facility] [place] Michalik, Daniel, MD    Office Visit    2 months ago Irritant contact dermatitis due to cosmetics    [facility] [place], [facility], Josse Mathur APRN    Office Visit    2 months ago Aortic valve stenosis, mild    [facility] [place], [address], Evans Wesley DO    Office Visit    3 months ago Positive self-administered antigen test for COVID-19    [facility] [place], [place], Josse Mathur APRN    Office Visit

## 2024-06-15 DIAGNOSIS — E66.09 NON MORBID OBESITY DUE TO EXCESS CALORIES: ICD-10-CM

## 2024-06-18 NOTE — TELEPHONE ENCOUNTER
Please review; protocol failed/ has no protocol      Recent fills: 05/03/2024  Last Rx written:11/20/2023 with 1 refill   Last Office Visit: 03/22/2024    Recent Visits  Date Type Provider Dept   03/22/24 Office Visit Josse Lai APRN [facility]     Future Appointments  Date Type Provider Dept   06/20/24 Appointment Evans Monge DO [facility]       Requested Prescriptions   Pending Prescriptions Disp Refills    PHENTERMINE HCL 15 MG Oral Cap [Pharmacy Med Name: PHENTERMINE HCL 15MG CAPSULES] 90 capsule 0     Sig: TAKE 1 CAPSULE(15 MG) BY MOUTH EVERY MORNING FOR WEIGHT LOSS       Controlled Substance Medication Failed - 6/15/2024  9:23 AM        Failed - This medication is a controlled substance - forward to provider to refill           Recent Outpatient Visits              1 month ago Eyelid dermatitis, allergic/contact    [facility] [place]    Office Visit    2 months ago Eyelid dermatitis, allergic/contact    [facility] [place] [place] Michalik, Daniel, MD    Office Visit    2 months ago [facility]ritant [facility]    [facility]    3 months ago Aortic valve stenosis, mild    [facility] Evans Monge DO    Office Visit    3 months ago Positive self-administered antigen test for COVID-19    [facility]    [facility] Visit          Future Appointments         Provider Department Appt Notes    In 2 days Evans Monge DO [facility] 3mo follow up

## 2024-06-19 RX ORDER — PHENTERMINE HYDROCHLORIDE 15 MG/1
15 CAPSULE ORAL EVERY MORNING
Qty: 90 CAPSULE | Refills: 0 | Status: SHIPPED | OUTPATIENT
Start: 2024-06-19

## 2024-06-20 ENCOUNTER — OFFICE VISIT (OUTPATIENT)
Dept: FAMILY MEDICINE CLINIC | Facility: CLINIC | Age: 54
End: 2024-06-20

## 2024-06-20 VITALS
TEMPERATURE: 97 F | HEIGHT: 62 IN | WEIGHT: 157 LBS | DIASTOLIC BLOOD PRESSURE: 88 MMHG | SYSTOLIC BLOOD PRESSURE: 133 MMHG | BODY MASS INDEX: 28.89 KG/M2 | HEART RATE: 86 BPM

## 2024-06-20 DIAGNOSIS — M20.12 VALGUS DEFORMITY OF BOTH GREAT TOES: ICD-10-CM

## 2024-06-20 DIAGNOSIS — R91.1 PULMONARY NODULE, LEFT: Primary | ICD-10-CM

## 2024-06-20 DIAGNOSIS — F41.9 ANXIETY: ICD-10-CM

## 2024-06-20 DIAGNOSIS — M20.42 HAMMER TOES OF BOTH FEET: ICD-10-CM

## 2024-06-20 DIAGNOSIS — M20.11 VALGUS DEFORMITY OF BOTH GREAT TOES: ICD-10-CM

## 2024-06-20 DIAGNOSIS — M79.671 PAIN IN BOTH FEET: ICD-10-CM

## 2024-06-20 DIAGNOSIS — Q23.1 BICUSPID AORTIC VALVE (HCC): ICD-10-CM

## 2024-06-20 DIAGNOSIS — M20.41 HAMMER TOES OF BOTH FEET: ICD-10-CM

## 2024-06-20 DIAGNOSIS — R07.89 NON-CARDIAC CHEST PAIN: ICD-10-CM

## 2024-06-20 DIAGNOSIS — M79.672 PAIN IN BOTH FEET: ICD-10-CM

## 2024-06-20 PROCEDURE — 99215 OFFICE O/P EST HI 40 MIN: CPT | Performed by: FAMILY MEDICINE

## 2024-06-20 NOTE — PROGRESS NOTES
Patient ID: Keysha Echevarria is a 54 year old female.    HPI  Chief Complaint   Patient presents with    Consult    Medication Follow-Up     Phentermine      Last seen by me on 3/19/2024.    Pt is compliant with Phentermine and her other medications. Pt hasn't been taking it since 6/14/2024 as she hasn't picked it up yet. Denies CP or SOB or palpitations with the phentermine and it does help curb her appetite.. Pt lost 3 lbs since March 2024. She was feeling angry and anxious due to family issues with her in-laws and developed chest pressure. Pt followed up with Dr. Darling, cardiologist on 5/8/2024; I reviewed the note. I also reviewed the results per cardiology and the paper copy of the note pt brought in. Dx bicuspid aortic valve. Pt completed CTA of coronary arteries on 3/27/2024 which showed calcium score of 39.2. Pt also completed a CT Cardiac over read which showed a 6 mm pulmonary nodule in SHARIF. She used to be a smoker but quit 5 years ago.  She has no cough or unintended weight loss.  No shortness of breath.    She saw a podiatrist in 2019 for Bunion on the right foot and hammer toes bilaterally. Pt now notices her 2nd and 3rd toe are splaying apart on the right foot. She also walks/exercises regularly but has pain. She feels this may have triggered her right back pain due to walking differently to compensate for her pain in feet. I discussed this and provided a referral to Dr. Swift.       Wt Readings from Last 6 Encounters:   06/20/24 157 lb   03/27/24 160 lb   03/22/24 160 lb 9.6 oz   03/19/24 161 lb 9.6 oz   02/27/24 161 lb   01/25/24 158 lb       BMI Readings from Last 6 Encounters:   06/20/24 28.72 kg/m²   03/27/24 29.26 kg/m²   03/22/24 29.37 kg/m²   03/19/24 29.56 kg/m²   02/27/24 29.45 kg/m²   01/25/24 28.90 kg/m²       BP Readings from Last 6 Encounters:   06/20/24 133/88   03/27/24 127/80   03/22/24 116/82   03/19/24 113/80   02/27/24 120/80   01/25/24 118/81         Review of Systems    Respiratory:  Negative for shortness of breath.    Cardiovascular:  Negative for chest pain.           Medical History:      Past Medical History:    Anxiety state    Depression    Obesity       Past Surgical History:   Procedure Laterality Date    Colonoscopy N/A 11/19/2020    Procedure: COLONOSCOPY;  Surgeon: Ignacio Wolf MD;  Location: [facility] ENDOSCOPY    Hysterectomy  2009    [facility] for heavy menses, anemia    Lipoma removal Left 2005    Lipoma removal of left flank    Needle biopsy left  05/15/2019    US bx    Needle biopsy right  05/2017    benign outside breast bx natalia    Other surgical history  05/01/2017    right breast biopsy           Current Outpatient Medications   Medication Sig Dispense Refill    Phentermine HCl 15 MG Oral Cap Take 1 capsule (15 mg total) by mouth every morning. For weight loss 90 capsule 0    levothyroxine 50 MCG Oral Tab Take 1 tablet (50 mcg total) by mouth before breakfast. 90 tablet 3    hydrocortisone 2.5 % External Ointment Apply to the affected areas on face up to twice daily Monday-Friday with flares. Use for up to 4 weeks 28 g 2    tacrolimus 0.1 % External Ointment Apply to eyelid twice daily 30 g 3    loratadine 10 MG Oral Tab Take 1 tablet (10 mg total) by mouth 2 (two) times daily as needed for Allergies.      Meloxicam 15 MG Oral Tab Meloxicam 15 MG Oral Tab, [RxNorm: 702554]      metoprolol tartrate 100 MG Oral Tab Take 1 tablet (100 mg total) by mouth As Directed. Pre-Med CTA, 2 doses      ibuprofen 600 MG Oral Tab Take 1 tablet (600 mg total) by mouth every 6 (six) hours as needed for Pain. Always take it with food. 60 tablet 0    cyclobenzaprine 5 MG Oral Tab Take 1 tablet (5 mg total) by mouth nightly as needed for Muscle spasms. 30 tablet 0    valACYclovir 1 G Oral Tab Take 2 tablets (2,000 mg total) by mouth every 12 (twelve) hours. For a total of 2 doses. 8 tablet 3    Halobetasol Propionate 0.05 % External Ointment Apply 1 g topically 2 (two) times daily. Can  apply to the hands along with the eczema as needed for flareups only. 15 g 0    hydrocortisone 0.5 % External Cream Apply 1 Application topically 2 (two) times daily. To the upper eyelids when they are swollen and itchy.  Apply a very thin amount and only for flareups. 15 g 0    ketoconazole 2 % External Shampoo Apply to scalp and behind the ears 2-3 times per week as needed. 120 mL 2    tacrolimus (PROTOPIC) 0.1 % External Ointment Apply 1 Application. topically 2 (two) times daily. 60 g 3    cetirizine 10 MG Oral Tab Take 1 tablet (10 mg total) by mouth daily. (Patient not taking: Reported on 4/8/2024) 30 tablet 0     Allergies:  Allergies   Allergen Reactions    Penicillins OTHER (SEE COMMENTS) and UNKNOWN     Since a child        Physical Exam:       Physical Exam  Blood pressure 133/88, pulse 86, temperature 97.1 °F (36.2 °C), temperature source Temporal, height 5' 2\" (1.575 m), weight 157 lb, not currently breastfeeding.      Physical Exam   Constitutional: Patient is oriented to person, place, and time. Patient appears well-developed and well-nourished. No distress.   Head: Normocephalic.   Eyes: Conjunctivae and EOM are normal.   Neck: Normal range of motion. No thyromegaly present.   Cardiovascular: Normal rate, regular rhythm. 2/6 JB at the right upper sternal border.     Pulmonary/Chest: Effort normal and breath sounds normal. No respiratory distress.   Lymphadenopathy: Patient has no cervical adenopathy.  Neurological: Patient is alert and oriented to person, place, and time.   Skin: Skin is warm.   Psychiatry: Mildly anxious when she was discussing her issues with her in-laws.  Feet: Her 2nd and 3rd toe are splaying apart on the right foot. She has hammer toes on the 2nd, 3rd, and 4th toes of both feet. She has bunions on 1st toe bilaterally. No tenderness over the 1st or 5th MTP bilaterally.     Vitals reviewed.           Assessment/Plan:      Diagnoses and all orders for this visit:    Pulmonary  nodule, left  -     CT CHEST (CPT=71250); Future  Extensive information to review not only in care everywhere but what she had brought with her from cardiology.  Discussed the pulmonary nodule at length.  It is quite small but we will do another CAT scan in March of next year.  Valgus deformity of both great toes  -     PODIATRY - INTERNAL  She must see podiatry.  I did tell her that surgery would most likely be the next option.  Hammer toes of both feet  -     PODIATRY - INTERNAL    Pain in both feet  -     PODIATRY - INTERNAL    Bicuspid aortic valve (HCC)  Reviewed echocardiogram.  She will follow-up with cardiology for follow-up echocardiograms in the future.  Anxiety  Breathing exercises to help relax . she does feel better knowing it was not cardiac chest pain.  Non-cardiac chest pain  Reviewed cardiac tests.      Referrals (if applicable)  Orders Placed This Encounter   Procedures    PODIATRY - INTERNAL     Referral Priority:   Routine     Referral Type:   OFFICE VISIT     Referred to Provider:   Vadim Swift DPM     Requested Specialty:   PODIATRIST     Number of Visits Requested:   3       Follow up if symptoms persist.  Take medicine (if given) as prescribed.  Approach to treatment discussed and patient/family member understands and agrees to plan.     No follow-ups on file.    There are no Patient Instructions on file for this visit.    Katelin Kumar    6/20/2024    By signing my name below, IKatelin,  attest that this documentation has been prepared under the direction and in the presence of Evans Monge DO.   Electronically Signed: Katelin Kumar, 6/20/2024, 11:26 AM.    I, Evans Monge DO,  personally performed the services described in this documentation. All medical record entries made by the scribe were at my direction and in my presence.  I have reviewed the chart and discharge instructions (if applicable) and agree that the record reflects my personal performance and is accurate and  complete.  Evans Monge DO, 6/20/2024, 1:05 PM

## 2024-08-14 ENCOUNTER — OFFICE VISIT (OUTPATIENT)
Dept: PODIATRY CLINIC | Facility: CLINIC | Age: 54
End: 2024-08-14
Payer: MEDICAID

## 2024-08-14 VITALS — SYSTOLIC BLOOD PRESSURE: 124 MMHG | DIASTOLIC BLOOD PRESSURE: 78 MMHG

## 2024-08-14 DIAGNOSIS — M20.41 HAMMER TOE OF RIGHT FOOT: ICD-10-CM

## 2024-08-14 DIAGNOSIS — M21.621 TAILOR'S BUNIONETTE, RIGHT: ICD-10-CM

## 2024-08-14 DIAGNOSIS — M20.11 HALLUX VALGUS, RIGHT: ICD-10-CM

## 2024-08-14 DIAGNOSIS — G57.61 MORTON NEUROMA, RIGHT: Primary | ICD-10-CM

## 2024-08-14 PROCEDURE — 64455 NJX AA&/STRD PLTR COM DG NRV: CPT | Performed by: STUDENT IN AN ORGANIZED HEALTH CARE EDUCATION/TRAINING PROGRAM

## 2024-08-14 PROCEDURE — 99203 OFFICE O/P NEW LOW 30 MIN: CPT | Performed by: STUDENT IN AN ORGANIZED HEALTH CARE EDUCATION/TRAINING PROGRAM

## 2024-08-14 RX ORDER — DEXAMETHASONE SODIUM PHOSPHATE 4 MG/ML
2 VIAL (ML) INJECTION ONCE
Status: COMPLETED | OUTPATIENT
Start: 2024-08-14 | End: 2024-08-14

## 2024-08-14 NOTE — PROGRESS NOTES
Per Dr. Swift to draw up .5ml of dexamethasone sodium phosphate. .5ml Kenalog. 10 and 1ml marcaine 0.5% for a right foot injection.

## 2024-08-14 NOTE — PROGRESS NOTES
[facility] Podiatry  Progress Note    Keysha Echevarria is a 54 year old female.   Chief Complaint   Patient presents with    Consult     Bump to lateral side of right foot - Denies pain- bunions bilateral feet- concerned about toes spreading         HPI:     Patient is a pleasant 54-year-old female who presents to clinic for evaluation of painful bunion and tailor's bunion to right foot.  She notices this is causing her toes to rub against each other making her hammertoes worse.  She is very active and this is starting to bother her with working out. She does also get some pain  in her forefoot region between her 2nd and third toes. She is wondering what treatment options are available.  She denies any recent trauma or injury.  No other complaints are mentioned.  Past medical history, medications, and allergies were reviewed.      Allergies: Penicillins   Current Outpatient Medications   Medication Sig Dispense Refill    Phentermine HCl 15 MG Oral Cap Take 1 capsule (15 mg total) by mouth every morning. For weight loss 90 capsule 0    levothyroxine 50 MCG Oral Tab Take 1 tablet (50 mcg total) by mouth before breakfast. 90 tablet 3    hydrocortisone 2.5 % External Ointment Apply to the affected areas on face up to twice daily Monday-Friday with flares. Use for up to 4 weeks 28 g 2    tacrolimus 0.1 % External Ointment Apply to eyelid twice daily 30 g 3    loratadine 10 MG Oral Tab Take 1 tablet (10 mg total) by mouth 2 (two) times daily as needed for Allergies.      Meloxicam 15 MG Oral Tab Meloxicam 15 MG Oral Tab, [RxNorm: 687896]      metoprolol tartrate 100 MG Oral Tab Take 1 tablet (100 mg total) by mouth As Directed. Pre-Med CTA, 2 doses      ibuprofen 600 MG Oral Tab Take 1 tablet (600 mg total) by mouth every 6 (six) hours as needed for Pain. Always take it with food. 60 tablet 0    cyclobenzaprine 5 MG Oral Tab Take 1 tablet (5 mg total) by mouth nightly as needed for Muscle spasms. 30  tablet 0    valACYclovir 1 G Oral Tab Take 2 tablets (2,000 mg total) by mouth every 12 (twelve) hours. For a total of 2 doses. 8 tablet 3    Halobetasol Propionate 0.05 % External Ointment Apply 1 g topically 2 (two) times daily. Can apply to the hands along with the eczema as needed for flareups only. 15 g 0    hydrocortisone 0.5 % External Cream Apply 1 Application topically 2 (two) times daily. To the upper eyelids when they are swollen and itchy.  Apply a very thin amount and only for flareups. 15 g 0    ketoconazole 2 % External Shampoo Apply to scalp and behind the ears 2-3 times per week as needed. 120 mL 2    tacrolimus (PROTOPIC) 0.1 % External Ointment Apply 1 Application. topically 2 (two) times daily. 60 g 3    cetirizine 10 MG Oral Tab Take 1 tablet (10 mg total) by mouth daily. (Patient not taking: Reported on 2024) 30 tablet 0      Past Medical History:    Anxiety state    Depression    Obesity      Past Surgical History:   Procedure Laterality Date    Colonoscopy N/A 2020    Procedure: COLONOSCOPY;  Surgeon: Ignacio Wolf MD;  Location: [facility] ENDOSCOPY    Hysterectomy      [facility] for heavy menses, anemia    Lipoma removal Left     Lipoma removal of left flank    Needle biopsy left  05/15/2019     bx    Needle biopsy right  2017    benign outside breast bx [place]    Other surgical history  2017    right breast biopsy       Family History   Problem Relation Age of Onset    Hypertension Mother     Breast Cancer Maternal Aunt 50         of complications of breast CA    Breast Cancer Maternal Aunt 70    Ovarian Cancer Neg     Uterine Cancer Neg     Colon Cancer Neg     Diabetes Neg     Glaucoma Neg       Social History     Socioeconomic History    Marital status:    Tobacco Use    Smoking status: Former     Current packs/day: 0.00     Types: Cigarettes     Quit date: 1/3/2023     Years since quittin.6    Smokeless tobacco: Never   Vaping Use    Vaping status: Never  Used   Substance and Sexual Activity    Alcohol use: Yes     Comment: socially, 1 drink/day    Drug use: No   Other Topics Concern    Grew up on a farm No    History of tanning Yes    Outdoor occupation No    Breast feeding No    Reaction to local anesthetic No    Pt has a pacemaker No    Pt has a defibrillator No           REVIEW OF SYSTEMS:     Today reviewed systems as documented below  GENERAL HEALTH: feels well otherwise  SKIN: denies any unusual skin lesions or rashes  RESPIRATORY: denies shortness of breath with exertion  CARDIOVASCULAR: denies chest pain on exertion  GI: denies abdominal pain and denies heartburn  NEURO: denies headaches  MUSCULO: denies arthritis, back pain      EXAM:   There were no vitals taken for this visit.  GENERAL: well developed, well nourished, in no apparent distress  EXTREMITIES:   1. Integument: Normal skin temperature and turgor  2. Vascular: Dorsalis pedis two out of four bilateral and posterior tibial pulses two out of   four bilateral, capillary refill normal.   3. Musculoskeletal: All muscle groups are graded 5 out of 5 in the foot and ankle.  Medial deviation of first metatarsal lateralization of hallux consistent with moderate bunion deformity.  Lateral deviation of fifth metatarsal medial deviation of fifth toe consistent with tailor's bunion deformity.  Flexion contracture of digits 2 through 4 that is semireducible.  Pain noted to bunion and tailor's bunion sites. Pain noted to 2nd interspace.    4. Neurological: Normal sharp dull sensation; reflexes normal.    Right foot x-rays: 8/14/2024:    Muñoz sign between second and third toes.  Medial deviation of first   metatarsal lateralization of the hallux consistent moderate bunion   deformity.  Tailor's bunion deformity also appreciated.  No acute fracture   or osseous abnormalities noted.         ASSESSMENT AND PLAN:   Diagnoses and all orders for this visit:    Hallux valgus, right    Tailor's bunionette,  right    Hammer toe of right foot        Plan:    -Patient examined, chart history reviewed.  -Discussed etiology of condition and various treatment options.  -X-rays obtained and reviewed.  Patient does have Jose Chele 22nd and third toes.  She also has bunion and tailor's bunion deformity.  -Discussed conservative and surgical treatment options.  -Discussed steroid injection with patient including benefits and risks.  -Patient agreeable with injection and written consent was obtained.  -After prepping site with alcohol, administered steroid injection to the right second interspace consisting of 1 cc of 0.5% Marcaine plain, 0.5 cc of Kenalog 10, and 0.5 cc of dexamethasone.  -Patient tolerated injection well and there were no complications.  Site was dressed with Band-Aid.  -Patient can ice or take ibuprofen if pain arises to site after injection  -Patient should ambulate with supportive shoes with wide toe box.  -Hopefully neuroma symptoms resolved after injection.  -Can consider bunion and tailor's bunion surgery in the future.  If neuroma does not improve could also consider surgical correction for this as well.    -We will plan to follow-up with patient in 1 month if symptoms persist or sooner further concerns arise       Neuroma injection, may consdier bunion/tailors bunion surgery  RTC 1 month    The patient indicates understanding of these issues and agrees to the plan.        Vadim Swift DPM    Dragon speech recognition software was used to prepare this note.  Errors in word recognition may occur.  Please contact me with any questions/concerns with this note.

## 2024-09-12 ENCOUNTER — OFFICE VISIT (OUTPATIENT)
Dept: FAMILY MEDICINE CLINIC | Facility: CLINIC | Age: 54
End: 2024-09-12

## 2024-09-12 VITALS
DIASTOLIC BLOOD PRESSURE: 83 MMHG | HEART RATE: 73 BPM | SYSTOLIC BLOOD PRESSURE: 121 MMHG | HEIGHT: 62 IN | WEIGHT: 157 LBS | BODY MASS INDEX: 28.89 KG/M2

## 2024-09-12 DIAGNOSIS — J02.9 SORE THROAT: ICD-10-CM

## 2024-09-12 DIAGNOSIS — R05.1 ACUTE COUGH: ICD-10-CM

## 2024-09-12 DIAGNOSIS — J02.0 STREP PHARYNGITIS: Primary | ICD-10-CM

## 2024-09-12 PROBLEM — K59.00 CONSTIPATION: Status: RESOLVED | Noted: 2019-02-04 | Resolved: 2024-09-12

## 2024-09-12 PROBLEM — J39.8 CONGESTION OF UPPER RESPIRATORY TRACT: Status: RESOLVED | Noted: 2024-02-27 | Resolved: 2024-09-12

## 2024-09-12 PROBLEM — J03.00 ACUTE NON-RECURRENT STREPTOCOCCAL TONSILLITIS: Status: RESOLVED | Noted: 2023-08-15 | Resolved: 2024-09-12

## 2024-09-12 PROBLEM — L29.9 ITCHING: Status: RESOLVED | Noted: 2024-03-22 | Resolved: 2024-09-12

## 2024-09-12 PROCEDURE — 87880 STREP A ASSAY W/OPTIC: CPT | Performed by: PHYSICIAN ASSISTANT

## 2024-09-12 PROCEDURE — 99213 OFFICE O/P EST LOW 20 MIN: CPT | Performed by: PHYSICIAN ASSISTANT

## 2024-09-12 RX ORDER — AZITHROMYCIN 250 MG/1
TABLET, FILM COATED ORAL
Qty: 6 TABLET | Refills: 0 | Status: SHIPPED | OUTPATIENT
Start: 2024-09-12 | End: 2024-09-16

## 2024-09-12 RX ORDER — BENZONATATE 200 MG/1
200 CAPSULE ORAL 3 TIMES DAILY PRN
Qty: 30 CAPSULE | Refills: 0 | Status: SHIPPED | OUTPATIENT
Start: 2024-09-12

## 2024-09-12 NOTE — PROGRESS NOTES
HPI:     Sore Throat   This is a new problem. Episode onset: 3 days. The problem has been unchanged. There has been no fever. Associated symptoms include congestion, coughing and headaches. Pertinent negatives include no abdominal pain, ear discharge, ear pain, shortness of breath, swollen glands or vomiting. She has tried NSAIDs for the symptoms. The treatment provided mild relief.         Medications:     Current Outpatient Medications   Medication Sig Dispense Refill    benzonatate 200 MG Oral Cap Take 1 capsule (200 mg total) by mouth 3 (three) times daily as needed for cough. 30 capsule 0    azithromycin 250 MG Oral Tab Take 2 tablets (500 mg total) by mouth daily for 1 day, THEN 1 tablet (250 mg total) daily for 4 days. 6 tablet 0    Phentermine HCl 15 MG Oral Cap Take 1 capsule (15 mg total) by mouth every morning. For weight loss 90 capsule 0    levothyroxine 50 MCG Oral Tab Take 1 tablet (50 mcg total) by mouth before breakfast. 90 tablet 3    hydrocortisone 2.5 % External Ointment Apply to the affected areas on face up to twice daily Monday-Friday with flares. Use for up to 4 weeks 28 g 2    tacrolimus 0.1 % External Ointment Apply to eyelid twice daily 30 g 3    loratadine 10 MG Oral Tab Take 1 tablet (10 mg total) by mouth 2 (two) times daily as needed for Allergies.      cetirizine 10 MG Oral Tab Take 1 tablet (10 mg total) by mouth daily. 30 tablet 0    Meloxicam 15 MG Oral Tab Meloxicam 15 MG Oral Tab, [RxNorm: 034666]      metoprolol tartrate 100 MG Oral Tab Take 1 tablet (100 mg total) by mouth As Directed. Pre-Med CTA, 2 doses      ibuprofen 600 MG Oral Tab Take 1 tablet (600 mg total) by mouth every 6 (six) hours as needed for Pain. Always take it with food. 60 tablet 0    cyclobenzaprine 5 MG Oral Tab Take 1 tablet (5 mg total) by mouth nightly as needed for Muscle spasms. 30 tablet 0    valACYclovir 1 G Oral Tab Take 2 tablets (2,000 mg total) by mouth every 12 (twelve) hours. For a total of 2  doses. 8 tablet 3    Halobetasol Propionate 0.05 % External Ointment Apply 1 g topically 2 (two) times daily. Can apply to the hands along with the eczema as needed for flareups only. 15 g 0    hydrocortisone 0.5 % External Cream Apply 1 Application topically 2 (two) times daily. To the upper eyelids when they are swollen and itchy.  Apply a very thin amount and only for flareups. 15 g 0    ketoconazole 2 % External Shampoo Apply to scalp and behind the ears 2-3 times per week as needed. 120 mL 2    tacrolimus (PROTOPIC) 0.1 % External Ointment Apply 1 Application. topically 2 (two) times daily. 60 g 3       Allergies:     Allergies   Allergen Reactions    Penicillins OTHER (SEE COMMENTS) and UNKNOWN     Since a child       History:     Health Maintenance   Topic Date Due    Mammogram  2024    COVID-19 Vaccine ( season) 2024    Annual Physical  2024    Influenza Vaccine (1) 10/01/2024    DTaP,Tdap,and Td Vaccines (4 - Td or Tdap) 2028    Colorectal Cancer Screening  2030    Annual Depression Screening  Completed    Pneumococcal Vaccine: Birth to 64yrs  Completed    Zoster Vaccines  Completed       No LMP recorded. Patient has had a hysterectomy.   Past Medical History:     Past Medical History:    Anxiety state    Depression    Obesity       Past Surgical History:     Past Surgical History:   Procedure Laterality Date    Colonoscopy N/A 2020    Procedure: COLONOSCOPY;  Surgeon: Ignacio Wolf MD;  Location: [facility] ENDOSCOPY    Hysterectomy      [facility] for heavy menses, anemia    Lipoma removal Left     Lipoma removal of left flank    Needle biopsy left  05/15/2019     bx    Needle biopsy right  2017    benign outside breast bx [place]    Other surgical history  2017    right breast biopsy        Family History:     Family History   Problem Relation Age of Onset    Hypertension Mother     Breast Cancer Maternal Aunt 50         of complications of breast CA     Breast Cancer Maternal Aunt 70    Ovarian Cancer Neg     Uterine Cancer Neg     Colon Cancer Neg     Diabetes Neg     Glaucoma Neg        Social History:     Social History     Socioeconomic History    Marital status:      Spouse name: Not on file    Number of children: Not on file    Years of education: Not on file    Highest education level: Not on file   Occupational History    Not on file   Tobacco Use    Smoking status: Former     Current packs/day: 0.00     Types: Cigarettes     Quit date: 1/3/2023     Years since quittin.6    Smokeless tobacco: Never   Vaping Use    Vaping status: Never Used   Substance and Sexual Activity    Alcohol use: Yes     Comment: socially, 1 drink/day    Drug use: No    Sexual activity: Not on file   Other Topics Concern    Grew up on a farm No    History of tanning Yes    Outdoor occupation No    Breast feeding No    Reaction to local anesthetic No    Pt has a pacemaker No    Pt has a defibrillator No   Social History Narrative    Not on file     Social Determinants of Health     Financial Resource Strain: Not on file   Food Insecurity: Not on file   Transportation Needs: Not on file   Physical Activity: Not on file   Stress: Not on file   Social Connections: Not on file   Housing Stability: Not on file       Review of Systems:   Review of Systems   HENT:  Positive for congestion and sore throat. Negative for ear discharge and ear pain.    Respiratory:  Positive for cough. Negative for shortness of breath.    Gastrointestinal:  Negative for abdominal pain and vomiting.   Neurological:  Positive for headaches.        Vitals:    24 1125   BP: 121/83   Pulse: 73   Weight: 157 lb (71.2 kg)   Height: 5' 2\" (1.575 m)     Body mass index is 28.72 kg/m².    Physical Exam:   Physical Exam  Vitals reviewed.   Constitutional:       General: She is not in acute distress.     Appearance: She is well-developed.   HENT:      Right Ear: Tympanic membrane, ear canal and external  ear normal. There is no impacted cerumen.      Left Ear: Tympanic membrane, ear canal and external ear normal. There is no impacted cerumen.      Nose: Nose normal.      Mouth/Throat:      Mouth: Mucous membranes are moist.      Pharynx: Oropharynx is clear. Posterior oropharyngeal erythema present. No oropharyngeal exudate.   Eyes:      General:         Right eye: No discharge.         Left eye: No discharge.      Conjunctiva/sclera: Conjunctivae normal.   Cardiovascular:      Rate and Rhythm: Normal rate and regular rhythm.      Heart sounds: Normal heart sounds, S1 normal and S2 normal. No murmur heard.  Pulmonary:      Effort: Pulmonary effort is normal.      Breath sounds: Normal breath sounds. No wheezing or rales.   Chest:      Chest wall: No tenderness.   Lymphadenopathy:      Cervical: Cervical adenopathy present.   Skin:     Findings: No rash.   Neurological:      Mental Status: She is alert and oriented to person, place, and time.   Psychiatric:         Behavior: Behavior is cooperative.          Assessment and Plan::     Problem List Items Addressed This Visit          Pulmonary and Pneumonias    Cough    Relevant Medications    benzonatate 200 MG Oral Cap         Other Visit Diagnoses       Strep pharyngitis    -  Primary    Relevant Medications    azithromycin 250 MG Oral Tab    Sore throat        Relevant Orders    POC Rapid Strep [89437] (Completed)        Supportive care includes:    encourage fluid intake   Advise patient to take Tylenol/Ibuprofen as needed for pain.  warm salt water gargles   humidifier     Discussed plan of care with pt and pt is in agreement.All questions answered. Pt to call with questions or concerns.

## 2024-09-17 ENCOUNTER — OFFICE VISIT (OUTPATIENT)
Dept: PODIATRY CLINIC | Facility: CLINIC | Age: 54
End: 2024-09-17
Payer: MEDICAID

## 2024-09-17 DIAGNOSIS — G57.61 MORTON NEUROMA, RIGHT: ICD-10-CM

## 2024-09-17 DIAGNOSIS — M20.41 HAMMER TOE OF RIGHT FOOT: ICD-10-CM

## 2024-09-17 DIAGNOSIS — M21.621 TAILOR'S BUNIONETTE, RIGHT: ICD-10-CM

## 2024-09-17 DIAGNOSIS — M20.11 HALLUX VALGUS, RIGHT: Primary | ICD-10-CM

## 2024-09-17 PROCEDURE — 99213 OFFICE O/P EST LOW 20 MIN: CPT | Performed by: STUDENT IN AN ORGANIZED HEALTH CARE EDUCATION/TRAINING PROGRAM

## 2024-09-17 NOTE — PROGRESS NOTES
[facility] Podiatry  Progress Note    Keysha Echevarria is a 54 year old female.   Chief Complaint   Patient presents with    Follow - Up     Right foot - denies pain         HPI:     Patient is a pleasant 54-year-old female who presents to clinic for evaluation of painful bunion and tailor's bunion to right foot.  She notices this is causing her toes to rub against each other making her hammertoes worse.  She is very active and this is starting to bother her with working out.  Patient also has had some neuroma symptoms to her right foot and received cortisone injection for this at last visit.  This did work very nicely to help alleviate her symptoms.  She denies any pain at this site.  She is interested in possibly scheduling surgery for her bunion, tailor's bunion, hammertoes this winter.  No other complaints are mentioned.      Allergies: Penicillins   Current Outpatient Medications   Medication Sig Dispense Refill    Phentermine HCl 15 MG Oral Cap Take 1 capsule (15 mg total) by mouth every morning. For weight loss 90 capsule 0    levothyroxine 50 MCG Oral Tab Take 1 tablet (50 mcg total) by mouth before breakfast. 90 tablet 3    hydrocortisone 2.5 % External Ointment Apply to the affected areas on face up to twice daily Monday-Friday with flares. Use for up to 4 weeks 28 g 2    tacrolimus 0.1 % External Ointment Apply to eyelid twice daily 30 g 3    cetirizine 10 MG Oral Tab Take 1 tablet (10 mg total) by mouth daily. 30 tablet 0    Meloxicam 15 MG Oral Tab Meloxicam 15 MG Oral Tab, [RxNorm: 524694]      metoprolol tartrate 100 MG Oral Tab Take 1 tablet (100 mg total) by mouth As Directed. Pre-Med CTA, 2 doses      ibuprofen 600 MG Oral Tab Take 1 tablet (600 mg total) by mouth every 6 (six) hours as needed for Pain. Always take it with food. 60 tablet 0    valACYclovir 1 G Oral Tab Take 2 tablets (2,000 mg total) by mouth every 12 (twelve) hours. For a total of 2 doses. 8 tablet 3     Halobetasol Propionate 0.05 % External Ointment Apply 1 g topically 2 (two) times daily. Can apply to the hands along with the eczema as needed for flareups only. 15 g 0    hydrocortisone 0.5 % External Cream Apply 1 Application topically 2 (two) times daily. To the upper eyelids when they are swollen and itchy.  Apply a very thin amount and only for flareups. 15 g 0    ketoconazole 2 % External Shampoo Apply to scalp and behind the ears 2-3 times per week as needed. 120 mL 2    tacrolimus (PROTOPIC) 0.1 % External Ointment Apply 1 Application. topically 2 (two) times daily. 60 g 3    Melatonin 3 MG Oral Cap Take by mouth.      cyclobenzaprine 5 MG Oral Tab Take 1 tablet (5 mg total) by mouth nightly as needed for Muscle spasms. 30 tablet 0    fluticasone propionate 50 MCG/ACT Nasal Suspension 2 sprays by Nasal route daily. Squeeze nose after sprays.  Do not snort into the back of the throat. 3 each 1      Past Medical History:    Anxiety state    Depression    Obesity      Past Surgical History:   Procedure Laterality Date    Colonoscopy N/A 2020    Procedure: COLONOSCOPY;  Surgeon: Ignacio Wolf MD;  Location: [facility] ENDOSCOPY    Hysterectomy      [facility] for heavy menses, anemia    Lipoma removal Left     Lipoma removal of left flank    Needle biopsy left  05/15/2019     bx    Needle biopsy right  2017    benign outside breast bx [place]    Other surgical history  2017    right breast biopsy       Family History   Problem Relation Age of Onset    Hypertension Mother     Breast Cancer Maternal Aunt 50         of complications of breast CA    Breast Cancer Maternal Aunt 70    Ovarian Cancer Neg     Uterine Cancer Neg     Colon Cancer Neg     Diabetes Neg     Glaucoma Neg       Social History     Socioeconomic History    Marital status:    Tobacco Use    Smoking status: Former     Current packs/day: 0.00     Types: Cigarettes     Quit date: 1/3/2023     Years since quittin.7     Smokeless tobacco: Never   Vaping Use    Vaping status: Never Used   Substance and Sexual Activity    Alcohol use: Yes     Comment: socially, 1 drink/day    Drug use: No   Other Topics Concern    Grew up on a farm No    History of tanning Yes    Outdoor occupation No    Breast feeding No    Reaction to local anesthetic No    Pt has a pacemaker No    Pt has a defibrillator No           REVIEW OF SYSTEMS:     Today reviewed systems as documented below  GENERAL HEALTH: feels well otherwise  SKIN: denies any unusual skin lesions or rashes  RESPIRATORY: denies shortness of breath with exertion  CARDIOVASCULAR: denies chest pain on exertion  GI: denies abdominal pain and denies heartburn  NEURO: denies headaches  MUSCULO: denies arthritis, back pain      EXAM:   There were no vitals taken for this visit.  GENERAL: well developed, well nourished, in no apparent distress  EXTREMITIES:   1. Integument: Normal skin temperature and turgor  2. Vascular: Dorsalis pedis two out of four bilateral and posterior tibial pulses two out of   four bilateral, capillary refill normal.   3. Musculoskeletal: All muscle groups are graded 5 out of 5 in the foot and ankle.  Medial deviation of first metatarsal lateralization of hallux consistent with moderate bunion deformity.  Lateral deviation of fifth metatarsal medial deviation of fifth toe consistent with tailor's bunion deformity.  Flexion contracture of digits 2 through 4 that is semireducible.  Pain noted to bunion and tailor's bunion sites. No pain noted to interspaces today.   4. Neurological: Normal sharp dull sensation; reflexes normal.    Right foot x-rays: 8/14/2024:    Muñoz sign between second and third toes.  Medial deviation of first   metatarsal lateralization of the hallux consistent moderate bunion   deformity.  Tailor's bunion deformity also appreciated.  No acute fracture   or osseous abnormalities noted.         ASSESSMENT AND PLAN:   Diagnoses and all orders for  this visit:    Hallux valgus, right    Tailor's bunionette, right    Hammer toe of right foot    Oreilly neuroma, right          Plan:    -Patient examined, chart history reviewed.  -Discussed etiology of condition and various treatment options.  -X-rays obtained and reviewed.  Patient does have wallace sign between her 2nd and third toes.  She also has bunion and tailor's bunion deformity with flexion contracture of her lesser digits.  -Discussed conservative and surgical treatment options.  -Her neuroma symptoms do appear resolved after prior cortisone injection.  Can continue to monitor moving forward.  -She is interested in possible surgery for her right foot bunion, hammertoes, and tailor's bunion.      Surgically, discussed that I would recommend right first MPJ fusion with PIPJ arthroplasty of digits 2 through 4 and tailor's bunionectomy with osteotomy to her fifth metatarsal.    These procedures were discussed in detail including benefits, risks, and recovery period.  Discussed that surgery should be motivated by pain and function.  Do not perform surgery for does not cause him pain for patient.  She is interested in scheduling surgery for this winter.  Will have our  reach out to tentatively schedule.  She will return for preop exam prior to potential surgery.      The patient indicates understanding of these issues and agrees to the plan.        CAROLINE Montanez speech recognition software was used to prepare this note.  Errors in word recognition may occur.  Please contact me with any questions/concerns with this note.

## 2024-09-20 ENCOUNTER — OFFICE VISIT (OUTPATIENT)
Dept: FAMILY MEDICINE CLINIC | Facility: CLINIC | Age: 54
End: 2024-09-20

## 2024-09-20 VITALS
WEIGHT: 155 LBS | HEART RATE: 77 BPM | SYSTOLIC BLOOD PRESSURE: 120 MMHG | HEIGHT: 62 IN | TEMPERATURE: 97 F | DIASTOLIC BLOOD PRESSURE: 82 MMHG | BODY MASS INDEX: 28.52 KG/M2

## 2024-09-20 DIAGNOSIS — M62.830 SPASM OF RIGHT TRAPEZIUS MUSCLE: ICD-10-CM

## 2024-09-20 DIAGNOSIS — J30.89 OTHER ALLERGIC RHINITIS: Primary | ICD-10-CM

## 2024-09-20 DIAGNOSIS — J34.3 HYPERTROPHY, NASAL, TURBINATE: ICD-10-CM

## 2024-09-20 DIAGNOSIS — Z12.31 VISIT FOR SCREENING MAMMOGRAM: ICD-10-CM

## 2024-09-20 DIAGNOSIS — M54.2 NECK PAIN ON RIGHT SIDE: ICD-10-CM

## 2024-09-20 PROCEDURE — 99214 OFFICE O/P EST MOD 30 MIN: CPT | Performed by: FAMILY MEDICINE

## 2024-09-20 RX ORDER — FLUTICASONE PROPIONATE 50 MCG
2 SPRAY, SUSPENSION (ML) NASAL DAILY
Qty: 3 EACH | Refills: 1 | Status: SHIPPED | OUTPATIENT
Start: 2024-09-20 | End: 2025-01-18

## 2024-09-20 RX ORDER — CYCLOBENZAPRINE HCL 5 MG
5 TABLET ORAL NIGHTLY PRN
Qty: 30 TABLET | Refills: 0 | Status: SHIPPED | OUTPATIENT
Start: 2024-09-20

## 2024-09-20 NOTE — PROGRESS NOTES
Patient ID: Keysha Echevarria is a 54 year old female.    HPI  Chief Complaint   Patient presents with    Throat Problem     Feels phlegm in her throat      I had seen her in June.  She saw 1 my partners on 9/12/2024 for a sore throat that was going on for 3 days.  Rapid strep test was positive and placed on azithromycin.  Also Tessalon.    She has feels there is mucus in the back of her throat and even if she coughs she is not able to bring anything up.  There is no shortness of breath.  There is no congestion or runny nose.    Has Zyrtec at home but does not take it regularly for her allergies.  If she does take it she will take it at night.    She states she has been having some right posterior neck pain for quite some time.  She states even her son is palpated this area and told her it is very tense.  She still able to work out but at times she will feel a twinge of pain in this area.  There is no numbness or tingling down the arms.      Wt Readings from Last 6 Encounters:   09/20/24 155 lb (70.3 kg)   09/12/24 157 lb (71.2 kg)   06/20/24 157 lb (71.2 kg)   03/27/24 160 lb (72.6 kg)   03/22/24 160 lb 9.6 oz (72.8 kg)   03/19/24 161 lb 9.6 oz (73.3 kg)       BMI Readings from Last 6 Encounters:   09/20/24 28.35 kg/m²   09/12/24 28.72 kg/m²   06/20/24 28.72 kg/m²   03/27/24 29.26 kg/m²   03/22/24 29.37 kg/m²   03/19/24 29.56 kg/m²       BP Readings from Last 6 Encounters:   09/20/24 120/82   09/12/24 121/83   08/14/24 124/78   06/20/24 133/88   03/27/24 127/80   03/22/24 116/82         Review of Systems   Constitutional:  Negative for chills and fever.   HENT:  Negative for congestion, rhinorrhea and sore throat.    Respiratory:  Negative for shortness of breath.            Medical History:      Past Medical History:    Anxiety state    Depression    Obesity       Past Surgical History:   Procedure Laterality Date    Colonoscopy N/A 11/19/2020    Procedure: COLONOSCOPY;  Surgeon: Ignacio Wolf MD;   Location: [facility] ENDOSCOPY    Hysterectomy  2009    [facility] for heavy menses, anemia    Lipoma removal Left 2005    Lipoma removal of left flank    Needle biopsy left  05/15/2019    US bx    Needle biopsy right  05/2017    benign outside breast bx [place]    Other surgical history  05/01/2017    right breast biopsy           Current Outpatient Medications   Medication Sig Dispense Refill    Melatonin 3 MG Oral Cap Take by mouth.      Phentermine HCl 15 MG Oral Cap Take 1 capsule (15 mg total) by mouth every morning. For weight loss 90 capsule 0    levothyroxine 50 MCG Oral Tab Take 1 tablet (50 mcg total) by mouth before breakfast. 90 tablet 3    hydrocortisone 2.5 % External Ointment Apply to the affected areas on face up to twice daily Monday-Friday with flares. Use for up to 4 weeks 28 g 2    tacrolimus 0.1 % External Ointment Apply to eyelid twice daily 30 g 3    loratadine 10 MG Oral Tab Take 1 tablet (10 mg total) by mouth 2 (two) times daily as needed for Allergies.      cetirizine 10 MG Oral Tab Take 1 tablet (10 mg total) by mouth daily. 30 tablet 0    Meloxicam 15 MG Oral Tab Meloxicam 15 MG Oral Tab, [RxNorm: 082835]      metoprolol tartrate 100 MG Oral Tab Take 1 tablet (100 mg total) by mouth As Directed. Pre-Med CTA, 2 doses      ibuprofen 600 MG Oral Tab Take 1 tablet (600 mg total) by mouth every 6 (six) hours as needed for Pain. Always take it with food. 60 tablet 0    cyclobenzaprine 5 MG Oral Tab Take 1 tablet (5 mg total) by mouth nightly as needed for Muscle spasms. 30 tablet 0    valACYclovir 1 G Oral Tab Take 2 tablets (2,000 mg total) by mouth every 12 (twelve) hours. For a total of 2 doses. 8 tablet 3    Halobetasol Propionate 0.05 % External Ointment Apply 1 g topically 2 (two) times daily. Can apply to the hands along with the eczema as needed for flareups only. 15 g 0    hydrocortisone 0.5 % External Cream Apply 1 Application topically 2 (two) times daily. To the upper eyelids when they are  swollen and itchy.  Apply a very thin amount and only for flareups. 15 g 0    ketoconazole 2 % External Shampoo Apply to scalp and behind the ears 2-3 times per week as needed. 120 mL 2    tacrolimus (PROTOPIC) 0.1 % External Ointment Apply 1 Application. topically 2 (two) times daily. 60 g 3     Allergies:  Allergies   Allergen Reactions    Penicillins OTHER (SEE COMMENTS) and UNKNOWN     Since a child        Physical Exam:       Physical Exam  Blood pressure 120/82, pulse 77, temperature 97 °F (36.1 °C), temperature source Temporal, height 5' 2\" (1.575 m), weight 155 lb (70.3 kg), not currently breastfeeding.  Physical Exam   Constitutional: Patient is oriented to person, place, and time. Patient appears well-developed and well-nourished. No distress.   HENT:   Head: Normocephalic.   Right Ear: Tympanic membrane normal.   Left Ear: Tympanic membrane normal.   Nose: Mucosal edema and rhinorrhea present. No sinus tenderness   Nose: Pale boggy nasal mucosa with clear rhinorrhea.  Turbinates: moderate congestion.  Oropharynx: clear post nasal drainage with cobblestoning.  Tonsils: normal   Eyes: Conjunctivae and EOM are normal.   Neck: Neck is tender on the right cervical paraspinal muscle into the trapezius muscle with spasm.  She flinches with palpation.  No thyromegaly present.  Cardiovascular: Normal rate, regular rhythm and normal heart sounds.    Pulmonary/Chest: Effort normal and breath sounds normal. No respiratory distress.   Lymphadenopathy:     Has  no cervical adenopathy.   Neurological: Is alert and oriented to person, place, and time.   Skin: Skin is warm.   Psychiatric: has a normal mood and affect.   Vitals reviewed.           Assessment/Plan:      Diagnoses and all orders for this visit:    Other allergic rhinitis  -     fluticasone propionate 50 MCG/ACT Nasal Suspension; 2 sprays by Nasal route daily. Squeeze nose after sprays.  Do not snort into the back of the throat.  Clearly has allergies.  She  needs to be on Flonase every morning and then continue the Zyrtec at night which she states helps.  Visit for screening mammogram  -     3D Mammogram Digital Screen, Bilateral (CPT=77067/58228); Future    Neck pain on right side  -     cyclobenzaprine 5 MG Oral Tab; Take 1 tablet (5 mg total) by mouth nightly as needed for Muscle spasms.  -     PHYSICAL THERAPY - INTERNAL  She states cyclobenzaprine in the past has helped but I truly think she also needs physical therapy and she agrees to both.  Spasm of right trapezius muscle  -     cyclobenzaprine 5 MG Oral Tab; Take 1 tablet (5 mg total) by mouth nightly as needed for Muscle spasms.  -     PHYSICAL THERAPY - INTERNAL    Hypertrophy, nasal, turbinate  -     fluticasone propionate 50 MCG/ACT Nasal Suspension; 2 sprays by Nasal route daily. Squeeze nose after sprays.  Do not snort into the back of the throat.        Referrals (if applicable)  Orders Placed This Encounter   Procedures    PHYSICAL THERAPY - INTERNAL     Treatment: Evaluate & Treat and use Modalities if needed   Physician goals: Pain relief, Increased Function, Activities of daily living and Education   Frequency: 2-3 times per week........Duration: 4-6 wks........Number of visits: 9     Referral Priority:   Routine     Referral Type:   Rehab Services     Requested Specialty:   Physical Therapy     Number of Visits Requested:   9         Follow up if symptoms persist.  Take medicine (if given) as prescribed.  Approach to treatment discussed and patient/family member understands and agrees to plan.     Return in about 1 month (around 10/20/2024) for Physical Exam.        Evans Monge,   9/20/2024

## 2024-09-21 ENCOUNTER — TELEPHONE (OUTPATIENT)
Dept: PODIATRY CLINIC | Facility: CLINIC | Age: 54
End: 2024-09-21

## 2024-09-21 DIAGNOSIS — M20.41 HAMMER TOE OF RIGHT FOOT: ICD-10-CM

## 2024-09-21 DIAGNOSIS — M20.11 HALLUX VALGUS, RIGHT: Primary | ICD-10-CM

## 2024-09-21 DIAGNOSIS — M21.621 TAILOR'S BUNIONETTE, RIGHT: ICD-10-CM

## 2024-09-21 NOTE — TELEPHONE ENCOUNTER
Procedure:   Right 1st MPJ fusion  PIPJ arthroplasty of digits 2-4  Tailor's bunionectomy with osteotomy, right 5th metatarsal    CPT code:   68801  28285 x 3  51849  Length of Surgery: 3 hours  Any Instruments:   Podiatry tray, small power, mini fluoro, dwayne fixos screws, dwayne plates, 0.045\"  k wires  Call patient: ASAP  Anesthesia: Gen  Location: [facility]  Assistance: Dr. Lopez  Pacemaker: No  Anticoagulants: No  Nickel Allergy: No  Latex Allergy: No  Diagnosis/ICD Code:     ICD-10-CM    1. Hallux valgus, right  M20.11       2. Tailor's bunionette, right  M21.621       3. Hammer toe of right foot  M20.41            Hoping for surgery sometime in January or February, Dr. Lopez to assist

## 2024-09-23 ENCOUNTER — TELEPHONE (OUTPATIENT)
Dept: PODIATRY CLINIC | Facility: CLINIC | Age: 54
End: 2024-09-23

## 2024-09-23 DIAGNOSIS — M20.11 HALLUX VALGUS, RIGHT: Primary | ICD-10-CM

## 2024-09-23 DIAGNOSIS — M21.621 TAILOR'S BUNIONETTE, RIGHT: ICD-10-CM

## 2024-09-23 DIAGNOSIS — M20.41 HAMMER TOE OF RIGHT FOOT: ICD-10-CM

## 2024-09-23 NOTE — TELEPHONE ENCOUNTER
Dr. Monge,     The patient is scheduled for outpatient foot surgery with Dr. Swift on 2/26 for the following procedure(s): Right 1st MPJ fusion; PIPJ arthroplasty of digits 2-4; Tailor's bunionectomy with osteotomy, right 5th metatarsal.      The patient has been advised to contact your office for pre-operative clearance.  The patient needs the following test/exams    __X__ History and Physical (H&P) may be no older than THIRTY (30) days prior to surgery. If possible, please complete within 7 days prior to surgery. If done before 7 days of surgery date, an update to the office visit note is required within 7 days prior to surgery.     ___X_ EKG for patients that are age 50 or older, have hypertension, diabetes or a history of cardiac disease or are obese.  This should be no older than 6 months prior to surgery.    __X__ Complete Metabolic Panel (CMP) & CBC. This should be no older than 3 months prior to surgery.    Please include any other test you deem necessary for medical clearance.    Thank you,   Rosalba   Surgical Scheduler   [phone]

## 2024-09-23 NOTE — TELEPHONE ENCOUNTER
Okay if she is having surgery on February 26, 2025 she should be seen after January 26, 2025 as the preoperative exam needs to be done within 30 days before her surgery.

## 2024-09-23 NOTE — TELEPHONE ENCOUNTER
Patient was called and inform of Dr. Monge message below and she stated that she will call us back to confirm that surgery day. Per patient she wants to get it done at [facility] as its far for her. She will call us back to confirm the date of her surgery.

## 2024-10-02 ENCOUNTER — HOSPITAL ENCOUNTER (OUTPATIENT)
Dept: MAMMOGRAPHY | Facility: HOSPITAL | Age: 54
Discharge: HOME | End: 2024-10-02
Attending: FAMILY MEDICINE
Payer: MEDICAID

## 2024-10-02 DIAGNOSIS — Z12.31 VISIT FOR SCREENING MAMMOGRAM: ICD-10-CM

## 2024-10-02 PROCEDURE — 77063 BREAST TOMOSYNTHESIS BI: CPT | Performed by: FAMILY MEDICINE

## 2024-10-02 PROCEDURE — 77067 SCR MAMMO BI INCL CAD: CPT | Performed by: FAMILY MEDICINE

## 2024-10-07 ENCOUNTER — TELEPHONE (OUTPATIENT)
Dept: PHYSICAL THERAPY | Facility: HOSPITAL | Age: 54
End: 2024-10-07

## 2024-10-23 ENCOUNTER — OFFICE VISIT (OUTPATIENT)
Dept: PHYSICAL THERAPY | Age: 54
End: 2024-10-23
Attending: FAMILY MEDICINE
Payer: MEDICAID

## 2024-10-23 DIAGNOSIS — M62.830 SPASM OF RIGHT TRAPEZIUS MUSCLE: ICD-10-CM

## 2024-10-23 DIAGNOSIS — M54.2 NECK PAIN ON RIGHT SIDE: Primary | ICD-10-CM

## 2024-10-23 PROCEDURE — 97110 THERAPEUTIC EXERCISES: CPT

## 2024-10-23 PROCEDURE — 97140 MANUAL THERAPY 1/> REGIONS: CPT

## 2024-10-23 PROCEDURE — 97161 PT EVAL LOW COMPLEX 20 MIN: CPT

## 2024-10-23 NOTE — PROGRESS NOTES
P.T. EVALUATION:   Referring Physician: Dr. Monge  Diagnosis: Neck pain on right side (M54.2)  Spasm of right trapezius muscle (M62.830)      Date of Onset: December 2023 Date of Service: 10/23/2024     PATIENT SUMMARY   Patient verbalized consent for Physical Therapy evaluation and treatment.  Keysha Echevarria is a 54 year old y/o female who presents to therapy today with complaints of neck pain, spasm of right trapezius muscle  Pt describes pain level 6/10 at worst  History of current condition: Patient reports pain began last year with no mechanism of injury.  Patient reports pain is located in her right neck, right shoulder blade, right head, radiates to her right eye.  She reports she had x-rays with findings of arthritis.  Pt reports no UE symptoms and no numbness/tingling.     Current functional limitations include sleeping, turning head, looking down  Keysha describes prior level of function independent, lifts weights at the gym  Pt goals include pain relief  Past medical history was reviewed with Keysha. Patient  has a past medical history of Anxiety state, Depression, and Obesity. Other co-morbidities include back issues  Social hx: Pt is right handed. Patient is a homemaker.      ASSESSMENT:   Patient presents to PT clinic due to right sided neck pain, spasm of right trapezius muscle beginning last year.  Patient demos decreased cervical ROM, decreased RUE strength, guarded upper trunk posture, tension in R neck musculature, and pain.  These impairments are functionally limiting pt's ability to sleep, turn his head, and look down.  Keysha would benefit from skilled Physical Therapy to address the above impairments to relieve pain.     Precautions:  none     OBJECTIVE:   Observation: pt ambulates into clinic independently    Palpation:   (+) TTP R upper trap, R cervical paraspinals, R anterior scalenes, R SCM, R levator scap muscles    Sensation: normal BUE sensation     AROM:   Cervical ROM:  Flx:  WNL (pressure)  Ext: WNL (relief)  Rot: R min loss/WNL, L min loss  Lat flx: R min loss, L WNL    Shoulder ROM:  Flx: B WNL  Abd: B WNL  ER/HBH: B WNL  IR/HBB: R WNL, L WNL     Accessory motion:   PA assessment:  Cervical spine: not assessed   Thoracic spine: not assessed    Strength/MMT:   Shoulder flx: R 4+/5, L 5/5  Shoulder abd: R 4+/5, L 5/5  Biceps: R 4+/5, L 5/5  Triceps: R 4+/5, L 4+/5  Mid trap: next session  Low trap: next session    Posture: guarded upper trunk posture; increased cervical lordosis    Gait: pt ambulates into clinic independently    Functional Outcome Measure: Neck Disability Index: 12%     Today’s Treatment and Response:  Patient education provided on PT eval findings, treatment plan, goals,   Patient received there ex, STM to R/L upper trap musculature, R anterior scalene, R SCM, R levator scap  Charges: PT Eval, TE 1 Man 1      Total Timed Treatment: 45 min     Total Treatment Time: 45 min      PT Eval Low Complexity     PLAN OF CARE:    Goals:    Pt will improve cervical AROM rotation to WNL to improve tolerance for turning head to check blind spot while driving  Pt will report abolishment of headaches  Pt will report 0/10 neck pain with sleeping  Pt will be independent and compliant with comprehensive HEP to maintain progress achieved in PT     Frequency / Duration: Patient will be seen for 2x/week or a total of 8-10 visits over a 90 day period. Treatment will include: Modalities prn, manual therapy, therapeutic exercises, therapeutic activity, and instructions on a home program.     Education or treatment limitation: None  Rehab Potential:good    Patient was advised of these findings, precautions, and treatment options and has agreed to actively participate in planning and for this course of care.    Thank you for your referral. Please co-sign or sign and return this letter via fax as soon as possible to [phone]. If you have any questions, please contact me at Dept:  [phone]    Sincerely,  Electronically signed by therapist: Mary Alanis PT, DPT    Physician's certification required: Yes  I certify the need for these services furnished under this plan of treatment and while under my care.    X___________________________________________________ Date____________________    Certification From: 10/23/2024  To:1/21/2025

## 2024-10-28 ENCOUNTER — APPOINTMENT (OUTPATIENT)
Dept: PHYSICAL THERAPY | Age: 54
End: 2024-10-28
Attending: FAMILY MEDICINE
Payer: MEDICAID

## 2024-10-30 ENCOUNTER — OFFICE VISIT (OUTPATIENT)
Dept: PHYSICAL THERAPY | Age: 54
End: 2024-10-30
Attending: FAMILY MEDICINE
Payer: MEDICAID

## 2024-10-30 PROCEDURE — 97140 MANUAL THERAPY 1/> REGIONS: CPT

## 2024-10-30 PROCEDURE — 97110 THERAPEUTIC EXERCISES: CPT

## 2024-10-30 NOTE — PROGRESS NOTES
Diagnosis:  Neck pain on right side (M54.2)  Spasm of right trapezius muscle (M62.830)              Next MD visit: none scheduled    Fall Risk: standard         Precautions: n/a          Medication Changes since last visit?: No    Subjective:  Patient reports no current pain.        Objective:     Date: 10/30/2024  Visit #: 2/6 (O) exp. 12/24/2024   HEP   -    Therapeutic Exercise   X 30 minutes  - SciFit (UEs only) level 1 x 3 minutes  - seated c-extension with towel 2 x 10  - standing B high scap rows with GSC 2 x 10  - standing B shoulder extension with GSC 2 x 10  - snow angels 10x  - standing B bicep curls 3# 2 x 10  - supine c-retraction 2 x 10  - supine R/L c-rotation 10x ea  - supine B chest press 3# 2 x 10  - sidelying R/L upper trunk rotation 10x ea  - supine B shoulder flexion 2# 2 x 10  - supine repeat c-retraction 1 x 10  - supine B shoulder horizontal abd/add 2# 2 x 10   Manual Therapy   X 8 minutes  - STM to R/L cervical paraspinals, SCM, anterior scalenes, upper trap musculature   Therapeutic Activity   -   Modalities   X 5 minutes  - sitting with heat pad to neck              Assessment: Session focused on cervical stretching and UE, scapular strengthening.  Pt with reduced muscle tension noted this session.       Plan: continue PT    Charges: Ex 2 Man 1      Total Timed Treatment: 38 min  Total Treatment Time: 43 min

## 2024-10-31 ENCOUNTER — OFFICE VISIT (OUTPATIENT)
Dept: PHYSICAL THERAPY | Age: 54
End: 2024-10-31
Attending: FAMILY MEDICINE
Payer: MEDICAID

## 2024-10-31 PROCEDURE — 97110 THERAPEUTIC EXERCISES: CPT

## 2024-10-31 PROCEDURE — 97140 MANUAL THERAPY 1/> REGIONS: CPT

## 2024-10-31 NOTE — PROGRESS NOTES
Diagnosis:  Neck pain on right side (M54.2)  Spasm of right trapezius muscle (M62.830)              Next MD visit: none scheduled    Fall Risk: standard         Precautions: n/a          Medication Changes since last visit?: No    Subjective:  Patient reports reduced neck pain since start of PT and states she can feel some mild pain when moving her neck/head.  Pt reports 0/10 neck pain at rest.  She reports 4/10 pain with movement.       Objective:     Date: 10/31/2024  Visit #: 3/6 (HMO) exp. 12/24/2024 Date: 10/30/2024  Visit #: 2/6 (HMO) exp. 12/24/2024   HEP    -    Therapeutic Exercise   X 25 minutes  - SciFit (UEs only) level 1 x 5 minutes  - seated c-extension with towel 2 x 10  - supine c-retraction 2 x 10  - supine R/L c-rotation 10x ea  - prone Ts 2 x 10  - prone Ws 2 x 10  - prone Ys 1 x 10  - supine B chest press 3# 3 x 10  - supine B shoulder flexion 2# 2 x 10 X 30 minutes  - SciFit (UEs only) level 1 x 3 minutes  - seated c-extension with towel 2 x 10  - standing B high scap rows with GSC 2 x 10  - standing B shoulder extension with GSC 2 x 10  - snow angels 10x  - standing B bicep curls 3# 2 x 10  - supine c-retraction 2 x 10  - supine R/L c-rotation 10x ea  - supine B chest press 3# 2 x 10  - sidelying R/L upper trunk rotation 10x ea  - supine B shoulder flexion 2# 2 x 10  - supine repeat c-retraction 1 x 10  - supine B shoulder horizontal abd/add 2# 2 x 10   Manual Therapy   X 12 minutes  - STM to R/L cervical paraspinals, SCM, anterior scalenes, upper trap musculature X 8 minutes  - STM to R/L cervical paraspinals, SCM, anterior scalenes, upper trap musculature   Therapeutic Activity    -   Modalities   X 5 minutes  - sitting with heat pad to neck  X 5 minutes  - sitting with heat pad to neck               Assessment: Initiated prone scapular strengthening interventions this session.       Plan: continue PT    Charges: Ex 2 Man 1      Total Timed Treatment: 37 min  Total Treatment Time: 42 min

## 2024-11-04 ENCOUNTER — OFFICE VISIT (OUTPATIENT)
Dept: PHYSICAL THERAPY | Age: 54
End: 2024-11-04
Attending: FAMILY MEDICINE
Payer: MEDICAID

## 2024-11-04 PROCEDURE — 97140 MANUAL THERAPY 1/> REGIONS: CPT

## 2024-11-04 PROCEDURE — 97110 THERAPEUTIC EXERCISES: CPT

## 2024-11-04 NOTE — PROGRESS NOTES
Diagnosis:  Neck pain on right side (M54.2)  Spasm of right trapezius muscle (M62.830)              Next MD visit: none scheduled    Fall Risk: standard         Precautions: n/a          Medication Changes since last visit?: No      Subjective:  Patient reports 0/10 neck pain.        Objective:    11/4/2024:  Cervical ROM:  Flx: WNL  Ext: WNL (slight deviation observed)  Rot: R min loss (pain), L WNL  Lat flx: R min loss (pain), L WNL       Date: 11/4/2024  Visit #: 4/6 (HMO) exp. 12/24/2024 Date: 10/31/2024  Visit #: 3/6 (HMO) exp. 12/24/2024 Date: 10/30/2024  Visit #: 2/6 (HMO) exp. 12/24/2024   HEP   - updated HEP - see pt instructions section  -    Therapeutic Exercise   X 31 minutes  - seated thoracic extension over chair 10x  - seated c-retraction 10x  - seated c-extension with towel 11x  - supine R/L c-rotation 10x ea  - prone B shoulder extension 2# 2 x 12  - prone Ts 2 x 10  - prone Ws 2 x 10  - prone Ys 1 x 10  - supine B chest press 3# 3 x 10  - cat/camel 10x  - standing B scap rows with Blue sport cord 2 x 10  - standing B shoulder extension with GSC 2 x 10  - standing B bicep curls 3# 2 x 10 X 25 minutes  - SciFit (UEs only) level 1 x 5 minutes  - seated c-extension with towel 2 x 10  - supine c-retraction 2 x 10  - supine R/L c-rotation 10x ea  - prone Ts 2 x 10  - prone Ws 2 x 10  - prone Ys 1 x 10  - supine B chest press 3# 3 x 10  - supine B shoulder flexion 2# 2 x 10  - cat/camel 10x X 30 minutes  - SciFit (UEs only) level 1 x 3 minutes  - seated c-extension with towel 2 x 10  - standing B high scap rows with GSC 2 x 10  - standing B shoulder extension with GSC 2 x 10  - snow angels 10x  - standing B bicep curls 3# 2 x 10  - supine c-retraction 2 x 10  - supine R/L c-rotation 10x ea  - supine B chest press 3# 2 x 10  - sidelying R/L upper trunk rotation 10x ea  - supine B shoulder flexion 2# 2 x 10  - supine repeat c-retraction 1 x 10  - supine B shoulder horizontal abd/add 2# 2 x 10   Manual  Therapy   X 8 minutes  - STM to R/L cervical paraspinals, SCM, anterior scalenes, upper trap musculature X 12 minutes  - STM to R/L cervical paraspinals, SCM, anterior scalenes, upper trap musculature X 8 minutes  - STM to R/L cervical paraspinals, SCM, anterior scalenes, upper trap musculature   Therapeutic Activity     -   Modalities   X 5 minutes  - sitting with heat pad to neck  X 5 minutes  - sitting with heat pad to neck  X 5 minutes  - sitting with heat pad to neck              Assessment:   Patient with reduced overall pain since start of PT, but still displaying some limited c-ROM.      Goals:    Pt will improve cervical AROM rotation to WNL to improve tolerance for turning head to check blind spot while driving  Pt will report abolishment of headaches  Pt will report 0/10 neck pain with sleeping  Pt will be independent and compliant with comprehensive HEP to maintain progress achieved in PT       Plan: continue PT    Charges: Ex 2 Man 1      Total Timed Treatment: 39 min  Total Treatment Time: 44 min

## 2024-11-05 ENCOUNTER — LAB ENCOUNTER (OUTPATIENT)
Dept: LAB | Facility: HOSPITAL | Age: 54
End: 2024-11-05
Attending: INTERNAL MEDICINE
Payer: MEDICAID

## 2024-11-05 DIAGNOSIS — E78.5 HYPERLIPIDEMIA: Primary | ICD-10-CM

## 2024-11-05 LAB
ALT SERPL-CCNC: 18 U/L
AST SERPL-CCNC: 21 U/L (ref ?–34)
CHOLEST SERPL-MCNC: 184 MG/DL (ref ?–200)
FASTING PATIENT LIPID ANSWER: YES
HDLC SERPL-MCNC: 61 MG/DL (ref 40–59)
LDLC SERPL CALC-MCNC: 114 MG/DL (ref ?–100)
NONHDLC SERPL-MCNC: 123 MG/DL (ref ?–130)
TRIGL SERPL-MCNC: 44 MG/DL (ref 30–149)
VLDLC SERPL CALC-MCNC: 8 MG/DL (ref 0–30)

## 2024-11-05 PROCEDURE — 36415 COLL VENOUS BLD VENIPUNCTURE: CPT

## 2024-11-05 PROCEDURE — 84460 ALANINE AMINO (ALT) (SGPT): CPT

## 2024-11-05 PROCEDURE — 80061 LIPID PANEL: CPT

## 2024-11-05 PROCEDURE — 84450 TRANSFERASE (AST) (SGOT): CPT

## 2024-11-06 ENCOUNTER — APPOINTMENT (OUTPATIENT)
Dept: PHYSICAL THERAPY | Age: 54
End: 2024-11-06
Attending: FAMILY MEDICINE
Payer: MEDICAID

## 2024-11-20 ENCOUNTER — APPOINTMENT (OUTPATIENT)
Dept: PHYSICAL THERAPY | Age: 54
End: 2024-11-20
Attending: FAMILY MEDICINE
Payer: MEDICAID

## 2024-11-22 ENCOUNTER — APPOINTMENT (OUTPATIENT)
Dept: PHYSICAL THERAPY | Age: 54
End: 2024-11-22
Attending: FAMILY MEDICINE
Payer: MEDICAID

## 2024-11-27 ENCOUNTER — APPOINTMENT (OUTPATIENT)
Dept: PHYSICAL THERAPY | Age: 54
End: 2024-11-27
Attending: FAMILY MEDICINE
Payer: MEDICAID

## 2024-12-10 ENCOUNTER — OFFICE VISIT (OUTPATIENT)
Dept: FAMILY MEDICINE CLINIC | Facility: CLINIC | Age: 54
End: 2024-12-10
Payer: MEDICAID

## 2024-12-10 VITALS
WEIGHT: 157.81 LBS | BODY MASS INDEX: 29.04 KG/M2 | TEMPERATURE: 98 F | DIASTOLIC BLOOD PRESSURE: 83 MMHG | SYSTOLIC BLOOD PRESSURE: 120 MMHG | HEART RATE: 67 BPM | HEIGHT: 62 IN

## 2024-12-10 DIAGNOSIS — I35.0 AORTIC VALVE STENOSIS, MILD: ICD-10-CM

## 2024-12-10 DIAGNOSIS — M20.12 VALGUS DEFORMITY OF BOTH GREAT TOES: ICD-10-CM

## 2024-12-10 DIAGNOSIS — Z00.00 ADULT GENERAL MEDICAL EXAM: Primary | ICD-10-CM

## 2024-12-10 DIAGNOSIS — R91.1 PULMONARY NODULE, LEFT: ICD-10-CM

## 2024-12-10 DIAGNOSIS — Z23 INFLUENZA VACCINE NEEDED: ICD-10-CM

## 2024-12-10 DIAGNOSIS — Q23.81 BICUSPID AORTIC VALVE: ICD-10-CM

## 2024-12-10 DIAGNOSIS — M20.11 VALGUS DEFORMITY OF BOTH GREAT TOES: ICD-10-CM

## 2024-12-10 DIAGNOSIS — E03.9 ACQUIRED HYPOTHYROIDISM: ICD-10-CM

## 2024-12-10 DIAGNOSIS — M20.41 HAMMER TOES OF BOTH FEET: ICD-10-CM

## 2024-12-10 DIAGNOSIS — M20.42 HAMMER TOES OF BOTH FEET: ICD-10-CM

## 2024-12-10 DIAGNOSIS — J30.89 OTHER ALLERGIC RHINITIS: ICD-10-CM

## 2024-12-10 PROCEDURE — 90471 IMMUNIZATION ADMIN: CPT | Performed by: FAMILY MEDICINE

## 2024-12-10 PROCEDURE — 99396 PREV VISIT EST AGE 40-64: CPT | Performed by: FAMILY MEDICINE

## 2024-12-10 PROCEDURE — 90656 IIV3 VACC NO PRSV 0.5 ML IM: CPT | Performed by: FAMILY MEDICINE

## 2024-12-10 PROCEDURE — 99213 OFFICE O/P EST LOW 20 MIN: CPT | Performed by: FAMILY MEDICINE

## 2024-12-10 NOTE — PROGRESS NOTES
Subjective:   Keysha Echevarria is a 54 year old female who presents for a Annual Physical Exam (not Medicare, or ABN signed for Medicare non covered service) and scheduled follow up of multiple significant but stable problems.     Last physical done on 11/20/2023.    She stopped taking phentermine about six weeks ago but is beginning to feel hungry again. She wanted to consult if she should start taking it again or wait a month, I discussed with her and thought it would be a good idea for her to really try diet and lifestyle changes instead of getting right back on the medication.  She agreed.. I discussed her CT scan for small pulmonary nodule on the left, I reprinted  the order and it is due in March, 2025. She will receive the flu vaccine today.     Pt recently consulted with Dr. Darling, cardiology, on  and I reviewed these labs and discussed with her. She had a Holter monitor for a week, they called her today with the results. I reviewed his note from November and the echocardiogram. Hx bicuspid aortic stenosis. She will follow up with him and six months and they want her to focus on a Mediterranean diet instead of being placed on a statin at this time per cardiology.  I discussed with her.    Pt is getting a right foot surgery from Dr. Swift, podiatrist, on February, 2026 and wanted to know when to schedule and appointment with me prior. She also wanted to consult with me about whether she should wait to do surgery or not. I discussed with her.  She complains of right foot pain             Discussed Advance Care Planning with patient (and family/surrogate if present). Standard forms made available to patient in After Visit Summary.      Patient Active Problem List   Diagnosis    Hearing loss of right ear    Tinnitus    Hyperopia with astigmatism and presbyopia, bilateral    Unrefreshed by sleep    Lethargic    Snoring    Chronic pain of left knee    Tension headache    Depressive disorder     Allergic dermatitis    Non morbid obesity due to excess calories    ADOLFO (obstructive sleep apnea)    Age-related nuclear cataract of both eyes    Other allergic rhinitis    Benign paroxysmal positional vertigo    Bunion, right    Fibroadenoma of left breast    Mass of left breast    Anxiety    Cough    Pain in the coccyx    Patellar instability of left knee    Post-traumatic osteoarthritis of left knee    Eczema    Cherry angioma    Seborrheic dermatitis of scalp    Swelling of eyelid    Acquired hypothyroidism    Cold sore    Positive self-administered antigen test for COVID-19    Aortic valve stenosis, mild    Irritant contact dermatitis due to cosmetics     Allergies:  She is allergic to penicillins.    Current Medications:  Outpatient Medications Marked as Taking for the 12/10/24 encounter (Office Visit) with Evans Monge DO   Medication Sig    Melatonin 3 MG Oral Cap Take by mouth.    cyclobenzaprine 5 MG Oral Tab Take 1 tablet (5 mg total) by mouth nightly as needed for Muscle spasms.    fluticasone propionate 50 MCG/ACT Nasal Suspension 2 sprays by Nasal route daily. Squeeze nose after sprays.  Do not snort into the back of the throat.    Phentermine HCl 15 MG Oral Cap Take 1 capsule (15 mg total) by mouth every morning. For weight loss    levothyroxine 50 MCG Oral Tab Take 1 tablet (50 mcg total) by mouth before breakfast.    hydrocortisone 2.5 % External Ointment Apply to the affected areas on face up to twice daily Monday-Friday with flares. Use for up to 4 weeks    cetirizine 10 MG Oral Tab Take 1 tablet (10 mg total) by mouth daily.    ibuprofen 600 MG Oral Tab Take 1 tablet (600 mg total) by mouth every 6 (six) hours as needed for Pain. Always take it with food.    valACYclovir 1 G Oral Tab Take 2 tablets (2,000 mg total) by mouth every 12 (twelve) hours. For a total of 2 doses.    Halobetasol Propionate 0.05 % External Ointment Apply 1 g topically 2 (two) times daily. Can apply to the hands along  with the eczema as needed for flareups only.    hydrocortisone 0.5 % External Cream Apply 1 Application topically 2 (two) times daily. To the upper eyelids when they are swollen and itchy.  Apply a very thin amount and only for flareups.    ketoconazole 2 % External Shampoo Apply to scalp and behind the ears 2-3 times per week as needed.       Medical History:  She  has a past medical history of Anxiety state, Depression, and Obesity.  Surgical History:  She  has a past surgical history that includes other surgical history (2017); hysterectomy (); lipoma removal (Left, ); needle biopsy right (2017); needle biopsy left (05/15/2019); and colonoscopy (N/A, 2020).   Family History:  Her family history includes Breast Cancer (age of onset: 50) in her maternal aunt; Breast Cancer (age of onset: 70) in her maternal aunt; Hypertension in her mother.  Social History:  She  reports that she quit smoking about 23 months ago. Her smoking use included cigarettes. She has never used smokeless tobacco. She reports current alcohol use. She reports that she does not use drugs.    Tobacco:  She smoked tobacco in the past but quit greater than 12 months ago.  Social History     Tobacco Use   Smoking Status Former    Current packs/day: 0.00    Types: Cigarettes    Quit date: 1/3/2023    Years since quittin.9   Smokeless Tobacco Never          Review of Systems   Respiratory:  Negative for shortness of breath.    Cardiovascular:  Negative for chest pain.          Objective:   Physical Exam     Physical Exam   Constitutional: She appears well-developed and well-nourished. No distress.   Head: Normocephalic.   Right Ear: Tympanic membrane and ear canal normal.   Left Ear: Tympanic membrane and ear canal normal.   Nose: No mucosal edema or rhinorrhea.  Mouth/Throat: Oropharynx is clear and moist and mucous membranes are normal.   Eyes: Conjunctivae and EOM are normal. Pupils are equal, round, and reactive to  light.   Neck: Normal range of motion. Neck supple. No thyromegaly present.   Cardiovascular: Normal rate, regular rhythm. 1/6 JB at the RUSB.   Pulmonary/Chest: Effort normal and breath sounds normal. No respiratory distress.   Abdominal: Soft. Bowel sounds are normal. There is no hepatosplenomegaly. There is no tenderness.   Lymphadenopathy: She has no cervical adenopathy.   Neurological: She is alert and oriented to person, place, and time. She has normal reflexes. No cranial nerve deficit.   Skin: Skin is warm and dry. No rash noted.   Psychiatric: She has a normal mood and affect.  Lower legs: No edema of the legs bilaterally. 2+ pedal pulses bilaterally.    Right Foot: Hammer toes on the 2nd through 4th toes of the right foot. Bunions on the medial and lateral aspect of the right foot.  Because of the hammertoes her second and third toe are  and going in different directions.    Vitals reviewed.     /83 (BP Location: Left arm, Patient Position: Sitting, Cuff Size: adult)   Pulse 67   Temp 98 °F (36.7 °C) (Tympanic)   Ht 5' 2\" (1.575 m)   Wt 157 lb 12.8 oz   BMI 28.86 kg/m²  Estimated body mass index is 28.86 kg/m² as calculated from the following:    Height as of this encounter: 5' 2\" (1.575 m).    Weight as of this encounter: 157 lb 12.8 oz.      Assessment & Plan:   Keysha Echevarria is a 54 year old female who presents for a Medicare Assessment.     Diagnoses and all orders for this visit:    Adult general medical exam  -     CBC With Differential With Platelet; Future  -     Basic Metabolic Panel (8); Future  Labs ordered  Other allergic rhinitis  Takes medication if needed  Pulmonary nodule, left  Already printed out the CAT scan for next March  Acquired hypothyroidism  -     Assay, Thyroid Stim Hormone; Future  She is compliant with her thyroid medication  Aortic valve stenosis, mild  Just saw Dr. Darling the cardiologist and will follow-up in 6 months  Influenza vaccine  needed  -     Immunization Admin Counseling, 1st Component, 18 years and older  -     Fluzone trivalent vaccine, PF 0.5mL, 6mo+ (37282)    Bicuspid aortic valve  Stable and minimal stenosis  Hammer toes of both feet  I think it would be good to get the surgery done as she is having pain and it hurts to walk.  Valgus deformity of both great toes  As above.  I reviewed Dr. Swift's note.      Referrals (if applicable)  No orders of the defined types were placed in this encounter.        Follow up if symptoms persist.  Take medicine (if given) as prescribed.  Approach to treatment discussed and patient/family member understands and agrees to plan.     No follow-ups on file.    The patient indicates understanding of these issues and agrees to the plan.  Reinforced healthy diet, lifestyle, and exercise.      No follow-ups on file.     Marissa Mcgill, 12/10/2024   By signing my name below, IMarissa,  attest that this documentation has been prepared under the direction and in the presence of Evans Monge DO.   Electronically Signed: Marissa Mcgill, 12/10/2024, 11:24 AM.     I, Evans Monge DO,  personally performed the services described in this documentation. All medical record entries made by the scribe were at my direction and in my presence.  I have reviewed the chart and discharge instructions (if applicable) and agree that the record reflects my personal performance and is accurate and complete.  Evans Monge DO, 12/10/2024, 12:19 PM    Supplementary Documentation:   General Health:       Health Maintenance   Topic Date Due    COVID-19 Vaccine (5 - 2024-25 season) 09/01/2024    Influenza Vaccine (1) 10/01/2024    Annual Physical  11/20/2024    Mammogram  10/02/2025    DTaP,Tdap,and Td Vaccines (4 - Td or Tdap) 04/30/2028    Colorectal Cancer Screening  11/19/2030    Annual Depression Screening  Completed    Pneumococcal Vaccine: Birth to 64yrs  Completed    Zoster Vaccines  Completed

## 2024-12-10 NOTE — PATIENT INSTRUCTIONS
See me after January 27, 2025 for your preoperative clearance as it cannot be more than 30 days prior to your procedure.

## 2025-01-07 ENCOUNTER — LAB ENCOUNTER (OUTPATIENT)
Dept: LAB | Facility: HOSPITAL | Age: 55
End: 2025-01-07
Attending: FAMILY MEDICINE
Payer: MEDICAID

## 2025-01-07 DIAGNOSIS — E03.9 ACQUIRED HYPOTHYROIDISM: ICD-10-CM

## 2025-01-07 DIAGNOSIS — Z00.00 ADULT GENERAL MEDICAL EXAM: ICD-10-CM

## 2025-01-07 LAB
ANION GAP SERPL CALC-SCNC: 8 MMOL/L (ref 0–18)
BASOPHILS # BLD AUTO: 0.02 X10(3) UL (ref 0–0.2)
BASOPHILS NFR BLD AUTO: 0.3 %
BUN BLD-MCNC: 17 MG/DL (ref 9–23)
BUN/CREAT SERPL: 23 (ref 10–20)
CALCIUM BLD-MCNC: 9.7 MG/DL (ref 8.7–10.4)
CHLORIDE SERPL-SCNC: 105 MMOL/L (ref 98–112)
CO2 SERPL-SCNC: 30 MMOL/L (ref 21–32)
CREAT BLD-MCNC: 0.74 MG/DL
DEPRECATED RDW RBC AUTO: 39.9 FL (ref 35.1–46.3)
EGFRCR SERPLBLD CKD-EPI 2021: 96 ML/MIN/1.73M2 (ref 60–?)
EOSINOPHIL # BLD AUTO: 0.23 X10(3) UL (ref 0–0.7)
EOSINOPHIL NFR BLD AUTO: 4 %
ERYTHROCYTE [DISTWIDTH] IN BLOOD BY AUTOMATED COUNT: 12.7 % (ref 11–15)
FASTING STATUS PATIENT QL REPORTED: YES
GLUCOSE BLD-MCNC: 95 MG/DL (ref 70–99)
HCT VFR BLD AUTO: 39.7 %
HGB BLD-MCNC: 13.3 G/DL
IMM GRANULOCYTES # BLD AUTO: 0.01 X10(3) UL (ref 0–1)
IMM GRANULOCYTES NFR BLD: 0.2 %
LYMPHOCYTES # BLD AUTO: 1.55 X10(3) UL (ref 1–4)
LYMPHOCYTES NFR BLD AUTO: 27.1 %
MCH RBC QN AUTO: 29.1 PG (ref 26–34)
MCHC RBC AUTO-ENTMCNC: 33.5 G/DL (ref 31–37)
MCV RBC AUTO: 86.9 FL
MONOCYTES # BLD AUTO: 0.46 X10(3) UL (ref 0.1–1)
MONOCYTES NFR BLD AUTO: 8 %
NEUTROPHILS # BLD AUTO: 3.46 X10 (3) UL (ref 1.5–7.7)
NEUTROPHILS # BLD AUTO: 3.46 X10(3) UL (ref 1.5–7.7)
NEUTROPHILS NFR BLD AUTO: 60.4 %
OSMOLALITY SERPL CALC.SUM OF ELEC: 297 MOSM/KG (ref 275–295)
PLATELET # BLD AUTO: 272 10(3)UL (ref 150–450)
POTASSIUM SERPL-SCNC: 4.6 MMOL/L (ref 3.5–5.1)
RBC # BLD AUTO: 4.57 X10(6)UL
SODIUM SERPL-SCNC: 143 MMOL/L (ref 136–145)
TSI SER-ACNC: 3.19 UIU/ML (ref 0.55–4.78)
WBC # BLD AUTO: 5.7 X10(3) UL (ref 4–11)

## 2025-01-07 PROCEDURE — 36415 COLL VENOUS BLD VENIPUNCTURE: CPT

## 2025-01-07 PROCEDURE — 80048 BASIC METABOLIC PNL TOTAL CA: CPT

## 2025-01-07 PROCEDURE — 84443 ASSAY THYROID STIM HORMONE: CPT

## 2025-01-07 PROCEDURE — 85025 COMPLETE CBC W/AUTO DIFF WBC: CPT

## 2025-01-11 DIAGNOSIS — E03.9 ACQUIRED HYPOTHYROIDISM: ICD-10-CM

## 2025-01-11 RX ORDER — LEVOTHYROXINE SODIUM 50 UG/1
50 TABLET ORAL
Qty: 90 TABLET | Refills: 3 | Status: SHIPPED | OUTPATIENT
Start: 2025-01-11

## 2025-01-29 ENCOUNTER — TELEPHONE (OUTPATIENT)
Dept: PODIATRY CLINIC | Facility: CLINIC | Age: 55
End: 2025-01-29

## 2025-01-29 DIAGNOSIS — M21.621 TAILOR'S BUNIONETTE, RIGHT: ICD-10-CM

## 2025-01-29 DIAGNOSIS — M20.41 HAMMER TOE OF RIGHT FOOT: ICD-10-CM

## 2025-01-29 DIAGNOSIS — M20.11 HALLUX VALGUS, RIGHT: Primary | ICD-10-CM

## 2025-01-29 NOTE — TELEPHONE ENCOUNTER
Per patient wants to cancel her surgery 2/26 and will call back when she is ready to reschedule. Thank you

## 2025-03-22 ENCOUNTER — OFFICE VISIT (OUTPATIENT)
Dept: FAMILY MEDICINE CLINIC | Facility: CLINIC | Age: 55
End: 2025-03-22

## 2025-03-22 VITALS
TEMPERATURE: 99 F | HEIGHT: 62 IN | WEIGHT: 163 LBS | HEART RATE: 74 BPM | DIASTOLIC BLOOD PRESSURE: 86 MMHG | BODY MASS INDEX: 30 KG/M2 | SYSTOLIC BLOOD PRESSURE: 127 MMHG

## 2025-03-22 DIAGNOSIS — J02.0 STREP THROAT: Primary | ICD-10-CM

## 2025-03-22 DIAGNOSIS — J02.9 ACUTE PHARYNGITIS, UNSPECIFIED ETIOLOGY: ICD-10-CM

## 2025-03-22 DIAGNOSIS — L30.9 ECZEMA, UNSPECIFIED TYPE: ICD-10-CM

## 2025-03-22 PROCEDURE — 87880 STREP A ASSAY W/OPTIC: CPT | Performed by: FAMILY MEDICINE

## 2025-03-22 PROCEDURE — 99214 OFFICE O/P EST MOD 30 MIN: CPT | Performed by: FAMILY MEDICINE

## 2025-03-22 RX ORDER — TRIAMCINOLONE ACETONIDE 1 MG/G
1 CREAM TOPICAL 2 TIMES DAILY PRN
Qty: 15 G | Refills: 0 | Status: SHIPPED | OUTPATIENT
Start: 2025-03-22

## 2025-03-22 RX ORDER — AZITHROMYCIN 250 MG/1
TABLET, FILM COATED ORAL
Qty: 6 TABLET | Refills: 0 | Status: SHIPPED | OUTPATIENT
Start: 2025-03-22 | End: 2025-03-27

## 2025-03-22 NOTE — PROGRESS NOTES
Patient ID: Keysha Echevarria is a 55 year old female.         The following individual(s) verbally consented to be recorded using ambient AI listening technology and understand that they can each withdraw their consent to this listening technology at any point by asking the clinician to turn off or pause the recording:    Patient name: Keysha Echevarria  Additional names:           HPI  Chief Complaint   Patient presents with    Body ache and/or chills     X 3 days    Sore Throat     X 3 days       History of Present Illness  Keysha Echevarria is a 55 year old female who presents with sore throat and body aches.    She has been experiencing a sore throat and body aches for three days, beginning on Thursday. The sore throat is painful, especially when swallowing, and feels swollen. No high temperature is noted, but body aches were more severe yesterday, improving after taking one Advil and one Tylenol. She also has a runny nose with green mucus and a slight cough. Occasionally, she feels the need to take deep breaths.    In early February, she traveled to [place], where she became ill and was diagnosed with chronic streptococcus by a doctor without testing. She was prescribed levofloxacin 750 mg for seven days, to be stopped for five days, and then repeated. However, she only completed five days due to diarrhea.    She has a persistent rash on her right fourth finger over the dorsum of the DIP joint, which she scratches for relief. The rash is more pronounced in the winter, and she uses a cream to help close the cracks. Additionally, she has a rash on her buttocks, present for about two months, which itches and sometimes bleeds when scratched. She applies Vaseline and eczema cream without relief. A similar rash is present on her eyelids, for which a dermatologist recommended Vaseline.    She notes a weight gain of six pounds, attributing it to her recent travel to [place].    Wt  Readings from Last 6 Encounters:   03/22/25 163 lb (73.9 kg)   12/10/24 157 lb 12.8 oz (71.6 kg)   09/20/24 155 lb (70.3 kg)   09/12/24 157 lb (71.2 kg)   06/20/24 157 lb (71.2 kg)   03/27/24 160 lb (72.6 kg)       BMI Readings from Last 6 Encounters:   03/22/25 29.81 kg/m²   12/10/24 28.86 kg/m²   09/20/24 28.35 kg/m²   09/12/24 28.72 kg/m²   06/20/24 28.72 kg/m²   03/27/24 29.26 kg/m²       BP Readings from Last 6 Encounters:   03/22/25 127/86   12/10/24 120/83   09/20/24 120/82   09/12/24 121/83   08/14/24 124/78   06/20/24 133/88       Results  LABS  CBC: Within normal limits (01/07/2025)    Review of Systems  No exertional cardiac chest pain or shortness of breath unless stated in HPI which would take precedence.  See HPI for further review of systems.        Medical History:      Past Medical History:    Anxiety state    Depression    Obesity       Past Surgical History:   Procedure Laterality Date    Colonoscopy N/A 11/19/2020    Procedure: COLONOSCOPY;  Surgeon: Ignacio Wolf MD;  Location: [facility] ENDOSCOPY    Hysterectomy  2009    [facility] for heavy menses, anemia    Lipoma removal Left 2005    Lipoma removal of left flank    Needle biopsy left  05/15/2019    US bx    Needle biopsy right  05/2017    benign outside breast bx natalia    Other surgical history  05/01/2017    right breast biopsy           Current Outpatient Medications   Medication Sig Dispense Refill    levothyroxine 50 MCG Oral Tab Take 1 tablet (50 mcg total) by mouth before breakfast. 90 tablet 3    Melatonin 3 MG Oral Cap Take by mouth.      cyclobenzaprine 5 MG Oral Tab Take 1 tablet (5 mg total) by mouth nightly as needed for Muscle spasms. 30 tablet 0    Phentermine HCl 15 MG Oral Cap Take 1 capsule (15 mg total) by mouth every morning. For weight loss 90 capsule 0    hydrocortisone 2.5 % External Ointment Apply to the affected areas on face up to twice daily Monday-Friday with flares. Use for up to 4 weeks 28 g 2    cetirizine 10 MG Oral Tab  Take 1 tablet (10 mg total) by mouth daily. 30 tablet 0    ibuprofen 600 MG Oral Tab Take 1 tablet (600 mg total) by mouth every 6 (six) hours as needed for Pain. Always take it with food. 60 tablet 0    valACYclovir 1 G Oral Tab Take 2 tablets (2,000 mg total) by mouth every 12 (twelve) hours. For a total of 2 doses. 8 tablet 3    Halobetasol Propionate 0.05 % External Ointment Apply 1 g topically 2 (two) times daily. Can apply to the hands along with the eczema as needed for flareups only. 15 g 0    hydrocortisone 0.5 % External Cream Apply 1 Application topically 2 (two) times daily. To the upper eyelids when they are swollen and itchy.  Apply a very thin amount and only for flareups. 15 g 0    ketoconazole 2 % External Shampoo Apply to scalp and behind the ears 2-3 times per week as needed. 120 mL 2     Allergies:Allergies[1]     Physical Exam:       Physical Exam  Blood pressure 127/86, pulse 74, temperature 98.5 °F (36.9 °C), temperature source Tympanic, height 5' 2\" (1.575 m), weight 163 lb (73.9 kg), not currently breastfeeding.         Physical Exam   Constitutional: Patient is oriented to person, place, and time. Patient appears well-developed and well-nourished. No distress.   HENT:   Head: Normocephalic.   Right Ear: Tympanic membrane, external ear and ear canal normal.   Left Ear: Tympanic membrane, external ear and ear canal normal.   Nose: Mucosal edema and rhinorrhea present.   Nose: mild congestion with clear rhinorrhea.  OP: clear post nasal drainage without cobblestoning.  Tonsils: mildly enlarged and slightly red but no exudate bilaterally.  Eyes: Conjunctivae are normal.   Neck: Normal range of motion. Neck supple. No thyromegaly present.   Cardiovascular: Normal rate, regular rhythm and normal heart sounds.   Lymphadenopathy:     Patient has no  cervical adenopathy.    Pulmonary/Chest: Effort normal and breath sounds normal. No respiratory distress.   Neurological: Patient is alert and oriented  to person, place, and time.   Psychiatric: Patient has a normal mood and affect.   Vitals reviewed.    Physical Exam    RECTAL: Perianal rash, red and irritated.  No bleeding.  SKIN: Dry slightly cracked skin on the dorsum of the DIP joint of the right fourth finger but no infection.        Assessment/Plan:        Diagnoses and all orders for this visit:    Strep throat  -     azithromycin (ZITHROMAX Z-IVAN) 250 MG Oral Tab; Take 2 tablets (500 mg total) by mouth daily for 1 day, THEN 1 tablet (250 mg total) daily for 4 days.  Zithromax as directed.  Acute pharyngitis, unspecified etiology  -     POC Rapid Strep [11452]  -     azithromycin (ZITHROMAX Z-IVAN) 250 MG Oral Tab; Take 2 tablets (500 mg total) by mouth daily for 1 day, THEN 1 tablet (250 mg total) daily for 4 days.  As above.  Can take Advil if needed  Eczema, unspecified type  -     triamcinolone 0.1 % External Cream; Apply 1 Application topically 2 (two) times daily as needed. For itchy rash .  Continue moisturization and then apply a small amount to the finger and perirectal area as needed.  There is no rash on the eyelids as she is already seen dermatology for this.      Referrals (if applicable)  No orders of the defined types were placed in this encounter.        Follow up if symptoms persist.  Take medicine (if given) as prescribed.  Approach to treatment discussed and patient/family member understands and agrees to plan.     No follow-ups on file.      Assessment & Plan  Acute pharyngitis  Presents with sore throat, body aches, and slight cough for three days. Examination reveals slightly enlarged and red tonsils without pus. Reports recurrent throat infections and was previously advised to take antibiotics in [place] without a confirmed diagnosis. Zithromax is chosen for treatment due to its effectiveness for throat infections and her history and current symptoms. Informed that strep throat can occur frequently and tonsillectomy is considered only  after five infections in a year.  - Prescribe Zithromax for throat infection  - Advise on hand hygiene and avoiding touching the mouth to prevent recurrent infections    Eczema  Eczema with rash on the right fourth finger and buttocks, which is itchy and sometimes bleeds. Rash is red and irritated, particularly in winter. Has been using over-the-counter creams without relief. Triamcinolone cream is recommended for treatment.  - Prescribe triamcinolone cream for eczema  - Advise applying a thin layer of triamcinolone twice daily  - Recommend moisturizing regularly once the rash heals  - Advise follow-up with a dermatologist if symptoms persist    General Health Maintenance  Expresses concern about frequent infections and was advised by a previous doctor to take vitamins for immune support. Reassured that immune system is functioning well based on recent blood tests and advised against unnecessary supplements.  - Recommend taking a daily multivitamin such as a woman's one-a-day or Centrum    Evans Mariposa, DO  3/22/2025        [1]   Allergies  Allergen Reactions    Penicillins OTHER (SEE COMMENTS) and UNKNOWN     Since a child

## 2025-04-30 ENCOUNTER — APPOINTMENT (OUTPATIENT)
Dept: GENERAL RADIOLOGY | Age: 55
End: 2025-04-30
Payer: MEDICAID

## 2025-04-30 ENCOUNTER — HOSPITAL ENCOUNTER (OUTPATIENT)
Age: 55
Discharge: HOME | End: 2025-04-30
Payer: MEDICAID

## 2025-04-30 ENCOUNTER — NURSE TRIAGE (OUTPATIENT)
Dept: FAMILY MEDICINE CLINIC | Facility: CLINIC | Age: 55
End: 2025-04-30

## 2025-04-30 VITALS
HEART RATE: 67 BPM | TEMPERATURE: 98 F | RESPIRATION RATE: 16 BRPM | DIASTOLIC BLOOD PRESSURE: 77 MMHG | OXYGEN SATURATION: 97 % | SYSTOLIC BLOOD PRESSURE: 114 MMHG

## 2025-04-30 DIAGNOSIS — S83.92XA SPRAIN OF LEFT KNEE, UNSPECIFIED LIGAMENT, INITIAL ENCOUNTER: Primary | ICD-10-CM

## 2025-04-30 DIAGNOSIS — M17.10 ARTHRITIS OF KNEE: ICD-10-CM

## 2025-04-30 DIAGNOSIS — M25.462 KNEE EFFUSION, LEFT: ICD-10-CM

## 2025-04-30 PROCEDURE — 73562 X-RAY EXAM OF KNEE 3: CPT

## 2025-04-30 PROCEDURE — 73560 X-RAY EXAM OF KNEE 1 OR 2: CPT

## 2025-04-30 PROCEDURE — 99213 OFFICE O/P EST LOW 20 MIN: CPT

## 2025-04-30 NOTE — DISCHARGE INSTRUCTIONS
There is no fracture on your x-ray.  You likely have a sprain of your knee.  Wear the ace wrap for comfort.  Rest, ice and elevate.  Take Tylenol and Motrin for pain as needed.  If you develop any numbness, tingling, acutely worsening pain, swelling or any other concerning complaints you should go to the emergency department.  If you have persistent pain for more than the next week make an appointment to see the orthopedic specialist.  Otherwise follow-up with your primary care doctor.      To schedule an appointment with the Orthopedic and Sports Medicine department; please text or call [phone] and choose option 3 when prompted.

## 2025-04-30 NOTE — ED INITIAL ASSESSMENT (HPI)
Patient reports left knee and left leg pain that began  last night. States over the last 2 days have been at the gym. Denies injury, but states last night after stretching out leg while sitting on couch, patient felt pain to leg and reported pain with standing. Denies CP/SOB.

## 2025-04-30 NOTE — ED PROVIDER NOTES
Patient Seen in: [facility]      History     Chief Complaint   Patient presents with    Knee Pain    Leg Pain     Stated Complaint: Left Knee pain  Subjective:   Keysha is a 55-year-old female presenting to the immediate care complaining of pain to her left knee.  Patient states that yesterday she went to the gym and she was working out.  States that a few hours later she developed some pain and swelling to her left knee.  Patient states that yesterday she had some difficulty putting any weight on the left knee.  States that last night her  was rubbing her left knee and felt like something was out of place.  States that she took some ibuprofen last night and was able to sleep with and woke up this morning with continued pain.  Patient states that she was stepping into the bathtub and almost slipped causing her knee to twist and she heard a pop to the left knee.  Patient states that she has been able to bear weight on the left leg since then and she has had less pain.  Patient denies any weakness, numbness, tingling to her toes.  She denies any known injury or trauma.  She denies any other complaints or concerns.        Objective:   Past Medical History:    Anxiety state    Depression    Obesity            Past Surgical History:   Procedure Laterality Date    Colonoscopy N/A 2020    Procedure: COLONOSCOPY;  Surgeon: Ignacio Wolf MD;  Location: [facility] ENDOSCOPY    Hysterectomy      [facility] for heavy menses, anemia    Lipoma removal Left     Lipoma removal of left flank    Needle biopsy left  05/15/2019     bx    Needle biopsy right  2017    benign outside breast bx natalia    Other surgical history  2017    right breast biopsy               Social History     Socioeconomic History    Marital status:    Tobacco Use    Smoking status: Former     Current packs/day: 0.00     Types: Cigarettes     Quit date: 1/3/2023     Years since quittin.3    Smokeless tobacco: Never    Vaping Use    Vaping status: Never Used   Substance and Sexual Activity    Alcohol use: Yes     Comment: socially, 1 drink/day    Drug use: No   Other Topics Concern    [place] up [place] a farm No    History of tanning Yes    Outdoor occupation No    Breast feeding No    Reaction to local anesthetic No    Pt has a pacemaker No    Pt has a defibrillator No            Review of Systems    Positive for stated complaint: Knee Pain and Leg Pain    Other systems are as noted in HPI.  Constitutional and vital signs reviewed.      All other systems reviewed and negative except as noted above.    Physical Exam     ED Triage Vitals [04/30/25 1248]   /77   Pulse 67   Resp 16   Temp 97.8 °F (36.6 °C)   Temp src Oral   SpO2 97 %   O2 Device None (Room air)     Current:/77   Pulse 67   Temp 97.8 °F (36.6 °C) (Oral)   Resp 16   SpO2 97%     Physical Exam  Vitals and nursing note reviewed.   Constitutional:       General: She is not in acute distress.     Appearance: Normal appearance. She is not ill-appearing, toxic-appearing or diaphoretic.   HENT:      Head: Normocephalic.   Cardiovascular:      Rate and Rhythm: Normal rate.   Pulmonary:      Effort: Pulmonary effort is normal.   Musculoskeletal:         General: Normal range of motion.      Cervical back: Normal range of motion.      Left knee: Swelling, effusion and ecchymosis present. No deformity, erythema, lacerations, bony tenderness or crepitus. Normal range of motion. No tenderness. Normal pulse.      Instability Tests: Medial Olivia test negative and lateral Olivia test negative.      Right lower leg: Normal.      Left lower leg: Normal.      Comments: Positive CMS.  2+ DP and PT pulses.  Capillary refill less than 2 seconds.  Patient has full range of motion to the entire left knee.  Full range of motion to the entire left lower extremity.  The left lower leg, thigh, calf, hip, ankle and foot are unremarkable.  Patient has no calf pain, tenderness,  swelling, erythema or warmth. No abrasions or lacerations noted.  Slight ecchymosis noted to the left medial knee.  No obvious deformity noted.  No drainage or discharge noted.  Patient has mild anterior knee tenderness with palpation. Negative Olivia's test.     Skin:     General: Skin is warm and dry.      Capillary Refill: Capillary refill takes less than 2 seconds.   Neurological:      General: No focal deficit present.      Mental Status: She is alert and oriented to person, place, and time.   Psychiatric:         Mood and Affect: Mood normal.         Behavior: Behavior normal.         Thought Content: Thought content normal.         Judgment: Judgment normal.         ED Course   Radiology:    XR KNEE (3 VIEWS), LEFT (CPT=73562)   Final Result   PROCEDURE: XR KNEE ROUTINE (3 VIEWS), LEFT (CPT=73562)       COMPARISON: None.       INDICATIONS: Left knee pain x 1 day after stretching out leg       TECHNIQUE: 3 views were obtained.         FINDINGS:    BONES: Moderate narrowing of the lateral compartment with subchondral    sclerosis and irregularity of the lateral femoral condyle and lateral    tibial plateau. Moderate narrowing of the medial compartment. There are    tricompartmental osteophytes. No acute    fracture or dislocation. There is a superior patellar enthesophyte. There    is moderate narrowing of the patellofemoral compartment.   SOFT TISSUES: Negative. No visible soft tissue swelling.    EFFUSION: Moderate size suprapatellar joint effusion.   OTHER: Negative.                    =====   CONCLUSION:        Moderate left knee tricompartmental osteoarthritis.       Moderate size suprapatellar joint effusion.                 Dictated by (CST): Omer Hugo MD on 4/30/2025 at 1:51 PM        Finalized by (CST): Omer Hugo MD on 4/30/2025 at 1:53 PM                 Labs Reviewed - No data to display    MDM     Medical Decision Making  Differential diagnoses reflecting the complexity of care include but  are not limited to knee sprain, knee dislocation, knee fracture, osteoarthritis.    Comorbidities that add complexity to management include: None  History obtained by an independent source was from: Patient  My independent interpretations of studies include: X-ray  Patient is well appearing, non-toxic and in no acute distress.  Vital signs are stable.     55-year-old female presenting to the immediate care complaining of left knee pain following working out at the gym yesterday.  Describes a possible dislocation that reduced this morning while she was stepping into the shower.  She is neurovascularly intact.  There is no fracture on x-ray per the radiology read.  Patient's history and physical exam are consistent with a sprain.  Ace wrap placed for comfort.  Recommended RICE.  Recommended using Tylenol and Motrin for pain.  Recommended that if the patient develop any numbness, tingling, acutely worsening pain, swelling or any other concerns or complaints they go to the emergency department.  Recommended that if patient has persistent pain they make an appointment to follow-up with the orthopedic specialist.  Otherwise recommended follow-up with primary care doctor.    ED precautions discussed.  Patient (guardian) advised to follow up with PCP in 2-3 days.  Patient (guardian) agrees with this plan of care.  Patient (guardian) verbalizes understanding of discharge instructions and plan of care.      Amount and/or Complexity of Data Reviewed  Radiology: ordered and independent interpretation performed. Decision-making details documented in ED Course.    Risk  OTC drugs.        Disposition and Plan     Clinical Impression:  1. Sprain of left knee, unspecified ligament, initial encounter    2. Arthritis of knee    3. Knee effusion, left         Disposition:  Discharge  4/30/2025  2:05 pm    Follow-up:  Ortho  Line  [phone]              Medications Prescribed:  Discharge Medication List as of 4/30/2025  2:09  PM

## 2025-04-30 NOTE — TELEPHONE ENCOUNTER
Action Requested: Summary for Provider     []  Critical Lab, Recommendations Needed  [] Need Additional Advice  []   FYI    []   Need Orders  [] Need Medications Sent to Pharmacy  []  Other     SUMMARY: Reports pain in left knee that started last night after working out at the gym. Patient states she will go to immediate care due to no appointments being available today.     Reason for call: Knee Pain  Onset: Last night    Patient reports pain in knee that started last night after working out at the gym. Reports being able to put pressure on leg but is painful. Described the pain as a burning sensation about a 4. Pain is constant and knee is swollen. Denies chest, calf, foot, thigh or lower back pain. Also denies fever or difficulty breathing. Patient requested appointment for today. Advised no appointment available in office with provider today. Patient states she will go to immediate care. No further questions at this time.                    Reason for Disposition   Patient wants to be seen    Protocols used: Knee Injury-A-[place]

## 2025-05-01 ENCOUNTER — TELEPHONE (OUTPATIENT)
Dept: ORTHOPEDICS CLINIC | Facility: CLINIC | Age: 55
End: 2025-05-01

## 2025-05-01 DIAGNOSIS — R52 PAIN: Primary | ICD-10-CM

## 2025-05-02 ENCOUNTER — OFFICE VISIT (OUTPATIENT)
Dept: ORTHOPEDICS CLINIC | Facility: CLINIC | Age: 55
End: 2025-05-02
Payer: MEDICAID

## 2025-05-02 ENCOUNTER — HOSPITAL ENCOUNTER (OUTPATIENT)
Dept: GENERAL RADIOLOGY | Age: 55
Discharge: HOME | End: 2025-05-02
Attending: ORTHOPAEDIC SURGERY
Payer: MEDICAID

## 2025-05-02 VITALS — HEIGHT: 62 IN | WEIGHT: 160 LBS | BODY MASS INDEX: 29.44 KG/M2

## 2025-05-02 DIAGNOSIS — M21.062 ACQUIRED GENU VALGUM OF LEFT KNEE: ICD-10-CM

## 2025-05-02 DIAGNOSIS — R52 PAIN: ICD-10-CM

## 2025-05-02 DIAGNOSIS — M17.12 PRIMARY OSTEOARTHRITIS OF LEFT KNEE: Primary | ICD-10-CM

## 2025-05-02 PROCEDURE — 73564 X-RAY EXAM KNEE 4 OR MORE: CPT | Performed by: ORTHOPAEDIC SURGERY

## 2025-05-02 PROCEDURE — 99204 OFFICE O/P NEW MOD 45 MIN: CPT | Performed by: ORTHOPAEDIC SURGERY

## 2025-05-02 NOTE — PROGRESS NOTES
Chief Complaint: Knee Pain (Left knee - Pt states left knee feels like it was out of socket few days ago.  She wasn't able to fully extend knee due to knee feeling out of place. Popping in knee at time when extending knee. Went to  and was told she has sprain in knee. Has had knee dislocation about 20 years ago. Pt states knee feels unstable. )      HPI  Ms. Rey Echevarria is referred by No ref. provider found. Keysha Darlene Echevarria is a 55 year old female being seen in the office today for Knee Pain (Left knee - Pt states left knee feels like it was out of socket few days ago.  She wasn't able to fully extend knee due to knee feeling out of place. Popping in knee at time when extending knee. Went to  and was told she has sprain in knee. Has had knee dislocation about 20 years ago. Pt states knee feels unstable. )    Patient is a 55-year-old female presents today with complaints of left knee instability.  She states that this been going on for about 20 years she does have a history of a left knee dislocation which she describes as the patella moving out of position.  Since then she has had issues with the knee since then.  She describes very little pain.  However she notes that she has a snapping sensation in the knee and feels that the knee is unstable.  She has significant guarding in certain positions.  She feels that the knee will move out of position and that she will fall.  She also notes a worsening knock-knee deformity.  She is worried about the cosmesis related to this.  She denies any numbness or tingling.  She denies any bobby mechanical symptoms.  She denies any limitations in walking distance.  She has no limitation in ADLs.  She does note some subjective instability when she is navigating stairs specially when going downstairs.  She has not had physical therapy on several occasions, most recently approximately 3 years ago.  Otherwise she has not had any treatment for the knee.  She  seldomly takes ibuprofen, but tries to avoid this due to GI related issues.  She is a homemaker.  She lives with her .  She does remain active.  She likes to go to the gym and go on the treadmill, stair climber, and bike.      History:  Past Medical History[1]  Past Surgical History[2]  Family History[3]  Family Status   Relation Status    Mo Alive    Mat Aunt     Mat Aunt Alive    NEG (Not Specified)     Social History     Occupational History    Not on file   Tobacco Use    Smoking status: Former     Current packs/day: 0.00     Types: Cigarettes     Quit date: 1/3/2023     Years since quittin.3    Smokeless tobacco: Never   Vaping Use    Vaping status: Never Used   Substance and Sexual Activity    Alcohol use: Yes     Comment: socially, 1 drink/day    Drug use: No    Sexual activity: Not on file       Medications:  Current Medications[4]    Allergies:  Allergies[5]    Review of Systems  A comprehensive review of systems was completed and is negative unless noted above or in the HPI.    Physical Exam  Ht 5' 2\" (1.575 m)   Wt 160 lb (72.6 kg)   BMI 29.26 kg/m²   Body mass index is 29.26 kg/m².    Constitutional: The patient appears well-developed and well-nourished, in no apparent distress.   Psychiatric: The patient demonstrates good comprehension, judgment and decision making. Normal mood and affect.  Eyes: PER and EOM are normal.  ENT: Hearing appropriate for normal conversation.  Cardiovascular: The patient has symmetric pulses, 2+.  Distal extremity is warm and well perfused with good capillary refill.  Respiratory:  The patient is breathing comfortably without increased respiratory effort or use of accessory muscles.  Hematologic/Lymphatic: No lymphangitis. There is no appreciable enlargement of lymph nodes. No calf swelling, calf non-tender, negative Thorne's sign. No edema.  Skin: No wounds or ulcers. No hypertrophic scarring.  Neck: FROM without pain.  MSK:  Gait: Nonantalgic    left  Knee        Alignment: Valgus, correctable       Skin: Intact at the knee       Swelling: Mild soft tissue swelling   Effusion: 1+ effusion   Tenderness: Lateral joint line       Range of Motion:      Extension: 3 degrees hyperextension     Flexion: 130     Flexion Contracture: 0     Extensor La           McMurrays: Negative   Lachmann: Negative   Anterior Drawer: Negative   Posterior Drawer: Negative   Varus Stress Test: stable   Valgus Stress Test: stable       Patellar Tracking: Laterally   Patellar Crepitus: No   Extensor Mechanism: Intact       Hip ROM:   Intact       Sensation: intact   Motor: intact   Vascular: intact     Imaging:  I independently reviewed the patient's relevant imaging studies today.    4 weightbearing views of the affected knee were obtained today.  They demonstrate no acute fracture or dislocation.  They show lateral compartment joint space narrowing, osteophyte formation, and subchondral sclerosis, Kellgren-Forrest grade 4 OA.  Valgus deformity of approximately 15 degrees is noted.  The patella tracks laterally without subluxation or tilt.    Labs:  Lab Results   Component Value Date    WBC 5.7 2025    HGB 13.3 2025    HCT 39.7 2025    .0 2025     Lab Results   Component Value Date    ALB 4.3 2023     Lab Results   Component Value Date    GLU 95 2025    GLU 98 2023     (H) 2022       Assessment and Plan  Assessment & Plan  Primary osteoarthritis of left knee    Orders:    Physical Therapy Referral - [facility]    Acquired genu valgum of left knee    Orders:    Physical Therapy Referral - [facility]    I had a lengthy discussion with the patient today about the patient's knee OA.  Nonoperative and operative options for knee OA were discussed with the patient at length. Nonoperative measures include activity modification, anti-inflammatories, weight loss if applicable, physical therapy or a home exercise  program, bracing, cortisone injections and viscosupplementation injections.  Risks and benefits to injections were reviewed including but not limited to infection, skin discoloration/changes, temporary elevations in blood sugars, joint inflammatory reaction, persistent pain or increased pain at the injection site, and the chance that the injection may not help. If they fail these measures, and continue to suffer from functionally limiting pain that is negatively impacting their quality of life, a knee replacement can be considered.   Specifically in Mrs. Le's case, while she has radiographic evidence of severe OA, she has minimal pain.  I discussed that I would start with a conservative treatment approach including physical therapy for her instability and possibly a cortisone injection if she has swelling and pain complaints.  We also discussed briefly knee replacement surgery.  In general knee replacement and return for individuals were having pain and loss of function related to that.  Since this is not her major complaint, I would hold off on the knee replacement for now.  Specifically, I recommend the following:    They were given referral for physical therapy today.  They were recommended low impact aerobic exercises such as biking, elliptical, and swimming/water therapy. They were recommended against high impact activities such as running on hard surfaces and deep squatting activities.  There are no structural abnormalities to their knee, so the patient can be WBAT and activities as tolerated.  Follow up: 6 to 8 weeks for repeat clinical evaluation.  The patient understands and agrees with this plan. All of the patient's questions were answered today.      BMI is is above average; BMI management plan is completed    Frank Joe MD         [1]   Past Medical History:   Anxiety state    Depression    Obesity   [2]   Past Surgical History:  Procedure Laterality Date    Colonoscopy N/A 11/19/2020     Procedure: COLONOSCOPY;  Surgeon: Ignacio Wolf MD;  Location: [facility] ENDOSCOPY    Hysterectomy  2009    [facility] for heavy menses, anemia    Lipoma removal Left     Lipoma removal of left flank    Needle biopsy left  05/15/2019    US bx    Needle biopsy right  2017    benign outside breast bx natalia    Other surgical history  2017    right breast biopsy    [3]   Family History  Problem Relation Age of Onset    Hypertension Mother     Breast Cancer Maternal Aunt 50         of complications of breast CA    Breast Cancer Maternal Aunt 70    Ovarian Cancer Neg     Uterine Cancer Neg     Colon Cancer Neg     Diabetes Neg     Glaucoma Neg    [4]   Current Outpatient Medications   Medication Sig Dispense Refill    triamcinolone 0.1 % External Cream Apply 1 Application topically 2 (two) times daily as needed. For itchy rash . 15 g 0    levothyroxine 50 MCG Oral Tab Take 1 tablet (50 mcg total) by mouth before breakfast. 90 tablet 3    Melatonin 3 MG Oral Cap Take by mouth.      cyclobenzaprine 5 MG Oral Tab Take 1 tablet (5 mg total) by mouth nightly as needed for Muscle spasms. 30 tablet 0    Phentermine HCl 15 MG Oral Cap Take 1 capsule (15 mg total) by mouth every morning. For weight loss 90 capsule 0    hydrocortisone 2.5 % External Ointment Apply to the affected areas on face up to twice daily Monday-Friday with flares. Use for up to 4 weeks 28 g 2    cetirizine 10 MG Oral Tab Take 1 tablet (10 mg total) by mouth daily. 30 tablet 0    ibuprofen 600 MG Oral Tab Take 1 tablet (600 mg total) by mouth every 6 (six) hours as needed for Pain. Always take it with food. 60 tablet 0    valACYclovir 1 G Oral Tab Take 2 tablets (2,000 mg total) by mouth every 12 (twelve) hours. For a total of 2 doses. 8 tablet 3    ketoconazole 2 % External Shampoo Apply to scalp and behind the ears 2-3 times per week as needed. 120 mL 2   [5]   Allergies  Allergen Reactions    Penicillins OTHER (SEE COMMENTS) and UNKNOWN     Since a  child

## 2025-05-27 ENCOUNTER — OFFICE VISIT (OUTPATIENT)
Dept: PHYSICAL THERAPY | Age: 55
End: 2025-05-27
Attending: ORTHOPAEDIC SURGERY
Payer: MEDICAID

## 2025-05-27 DIAGNOSIS — M17.12 PRIMARY OSTEOARTHRITIS OF LEFT KNEE: Primary | ICD-10-CM

## 2025-05-27 DIAGNOSIS — M21.062 ACQUIRED GENU VALGUM OF LEFT KNEE: ICD-10-CM

## 2025-05-27 PROCEDURE — 97161 PT EVAL LOW COMPLEX 20 MIN: CPT

## 2025-05-27 PROCEDURE — 97110 THERAPEUTIC EXERCISES: CPT

## 2025-05-27 NOTE — PROGRESS NOTES
LOWER EXTREMITY EVALUATION:     Diagnosis:   Primary osteoarthritis of left knee (M17.12)  Acquired genu valgum of left knee (M21.062) Patient:  Keysha Echevarria (55 year old, female)        Referring Provider: Frank Joe  Today's Date   5/27/2025    Precautions:  None   Date of Evaluation: 05/27/25  Next MD visit: No data recorded  Date of Surgery: No data recorded     PATIENT SUMMARY   Summary of chief complaints: left knee instability  History of current condition: Patient endorses history of chronic knee injury. Patient states that 20 years ago she was roller skating and fell in a position of tibial abduction. Patient went to emergency room with unremarkable work up. Patient denies pain but endorses instability and swelling.   Pain level: current 0 /10, at best 0 /10, at worst 0 /10  Description of symptoms: unstable   Occupation: works from home   Leisure activities/Hobbies: pito   Prior level of function: IND with ADLs and IADLs  Current limitations: walking, transfers, stairs, pito  Pt goals: feel confident with activities  Past medical history was reviewed with Keysha.    Imaging/Tests: see chart   PHQ-2: (1) 0, (2) 0  Keysha  has a past medical history of Anxiety state, Depression, and Obesity.  She  has a past surgical history that includes other surgical history (05/01/2017); hysterectomy (2009); lipoma removal (Left, 2005); needle biopsy right (05/2017); needle biopsy left (05/15/2019); and colonoscopy (N/A, 11/19/2020).  Medications Ordered Prior to Encounter[1]     ASSESSMENT  Keysha presents to physical therapy evaluation with primary c/o left knee instability. The results of the objective tests and measures show left tibial abduction, hip abductor and extensor weakness, pain with squatting. Functional deficits include but are not limited to walking, transfers, stairs, pito. Signs and symptoms are consistent with diagnosis of Primary osteoarthritis of left knee (M17.12). Pt and PT  discussed evaluation findings, pathology, POC and HEP.  Pt voiced understanding and performs HEP correctly without reported pain. Skilled Physical Therapy is medically necessary to address the above impairments and reach functional goals.    OBJECTIVE:    Musculoskeletal  Observation: L tibial abduction (10 deg versus 4 deg R tibial abduction)    Passive physiological knee ROM:  ER: R WFL, L WFL   IR: R WFL, L WFL     ROM and Strength: (* denotes performed with pain)  Hip   ROM MMT (-/5)    R L R L     Ext      3/5 3/5     Abd WFL WFL but limited compared to R 3-/5 3-/5    ,   Knee   ROM MMT (-/5)    R L R L     Flex 135 130 5/5 5/5     Ext (L3) 0 0 5/5 5/5, 1/10 p*     Functional Mobility:  Functional Mobility:    Ambulation: L tibial abduction, L foot pronation    Stairs: reciprocal pattern ascending/descending    Squatting: ~90 deg depth with p*, decreased p* with superior glide of patella and medial glide of TFJ     Today's Treatment and Response:   Today's Treatment       5/27/2025   LE Treatment   Therapeutic Exercise - Pt education was provided on exam findings, treatment diagnosis, treatment plan, expectations, and prognosis.  - Glute bridge x5   Therapeutic Exercise Minutes 8   Evaluation Minutes 37   Total Time Of Timed Procedures 8   Total Time Of Service-Based Procedures 37   Total Treatment Time 45   HEP Glute bridge 3x10-12, daily         Patient was instructed in and issued a HEP for: Glute bridge 3x10-12, daily     Charges:  PT EVAL: Low Complexity, EX 1  In agreement with evaluation findings and clinical rationale, this evaluation involved LOW COMPLEXITY decision making due to no personal factors/comorbidities, 1-2 body structures involved/activity limitations, and stable symptoms as documented in the evaluation.                                                                                                                PLAN OF CARE:    Goals: (to be met in 8 visits)    Not Met Progress  Toward  Partially  Met Met   Patient will improve LEFS by at least 9 points to demonstrate minimally clinically important difference in patient reported outcome measure for knee pain. [] [] [] []   Patient will improve left hip extension strength by at least 1 MMT grade to improve independence with ADLs and IADLs.  [] [] [] []   Patient will improve hip abduction strength by at least 1 MMT grade to improve independence with ADLs and IADLs.  [] [] [] []   Patient will demonstrate left single leg step down without compensations to improve independence with ADLs and IADLs.  [] [] [] []       Frequency / Duration: Patient will be seen 1-2x/week or a total of 8  visits over a 90 day period. Treatment will include: Gait training; Manual Therapy; Neuromuscular Re-education; Therapeutic Activities; Therapeutic Exercise; Home Exercise Program instruction; Electrical stimulation (attended); Patient/Family Education    Education or treatment limitation: None   Rehab Potential: good     LEFS Score  LEFS Score: (Patient-Rptd) 86.25 % (5/27/2025  7:36 AM)      Patient/Family/Caregiver was advised of these findings, precautions, and treatment options and has agreed to actively participate in planning and for this course of care.    Thank you for your referral. Please co-sign or sign and return this letter via fax as soon as possible to [phone]. If you have any questions, please contact me at Dept: [phone]    Sincerely,  Electronically signed by therapist: Ade Zelaya, PT  Physician's certification required: Yes  I certify the need for these services furnished under this plan of treatment and while under my care.    X___________________________________________________ Date____________________    Certification From: 5/27/2025  To:8/25/2025           [1]   Current Outpatient Medications on File Prior to Visit   Medication Sig Dispense Refill    triamcinolone 0.1 % External Cream Apply 1 Application topically 2 (two) times daily as  needed. For itchy rash . 15 g 0    [] azithromycin (ZITHROMAX Z-IVAN) 250 MG Oral Tab Take 2 tablets (500 mg total) by mouth daily for 1 day, THEN 1 tablet (250 mg total) daily for 4 days. 6 tablet 0    levothyroxine 50 MCG Oral Tab Take 1 tablet (50 mcg total) by mouth before breakfast. 90 tablet 3    Melatonin 3 MG Oral Cap Take by mouth.      cyclobenzaprine 5 MG Oral Tab Take 1 tablet (5 mg total) by mouth nightly as needed for Muscle spasms. 30 tablet 0    Phentermine HCl 15 MG Oral Cap Take 1 capsule (15 mg total) by mouth every morning. For weight loss 90 capsule 0    hydrocortisone 2.5 % External Ointment Apply to the affected areas on face up to twice daily Monday-Friday with flares. Use for up to 4 weeks 28 g 2    cetirizine 10 MG Oral Tab Take 1 tablet (10 mg total) by mouth daily. 30 tablet 0    ibuprofen 600 MG Oral Tab Take 1 tablet (600 mg total) by mouth every 6 (six) hours as needed for Pain. Always take it with food. 60 tablet 0    valACYclovir 1 G Oral Tab Take 2 tablets (2,000 mg total) by mouth every 12 (twelve) hours. For a total of 2 doses. 8 tablet 3    ketoconazole 2 % External Shampoo Apply to scalp and behind the ears 2-3 times per week as needed. 120 mL 2     No current facility-administered medications on file prior to visit.

## 2025-05-29 ENCOUNTER — OFFICE VISIT (OUTPATIENT)
Dept: PHYSICAL THERAPY | Age: 55
End: 2025-05-29
Attending: ORTHOPAEDIC SURGERY
Payer: MEDICAID

## 2025-05-29 PROCEDURE — 97110 THERAPEUTIC EXERCISES: CPT

## 2025-05-29 PROCEDURE — 97140 MANUAL THERAPY 1/> REGIONS: CPT

## 2025-05-29 PROCEDURE — 97530 THERAPEUTIC ACTIVITIES: CPT

## 2025-05-29 PROCEDURE — 97112 NEUROMUSCULAR REEDUCATION: CPT

## 2025-05-29 NOTE — PROGRESS NOTES
Patient: Keysha Echevarria (55 year old, female) Referring Provider:  Insurance:   Diagnosis: Primary osteoarthritis of left knee (M17.12)  Acquired genu valgum of left knee (M21.062) Frank SARAH Alvarezsamantha  [facility] MEDICAID   Date of Surgery: No data recorded Next MD visit:  N/A   Precautions:  None No data recorded Referral Information:    Date of Evaluation: Req: 8, Auth: 8, Exp: 7/26/2025 05/27/25 POC Auth Visits:          Today's Date   5/29/2025    Subjective  symptoms are about the same, compliant with exercises.       Pain: 0/10     Objective  pain with end range knee flexion with OP, hypomobility to medial glide at tibiofemoral joint, knee valgus collapse with step down, increase step with during gait          Assessment  Performed exercises focused on hip and foot control for neutral knee position during single limb activity. Pt tolerated therapy well and demonstrated improved gait mechanics post.    Goals (to be met in 8 visits)   Goals: (to be met in 8 visits)     Not Met Progress  Toward Partially  Met Met   Patient will improve LEFS by at least 9 points to demonstrate minimally clinically important difference in patient reported outcome measure for knee pain. []  []  []  []    Patient will improve left hip extension strength by at least 1 MMT grade to improve independence with ADLs and IADLs.  []  []  []  []    Patient will improve hip abduction strength by at least 1 MMT grade to improve independence with ADLs and IADLs.  []  []  []  []    Patient will demonstrate left single leg step down without compensations to improve independence with ADLs and IADLs.  []  []  []  []         Plan  Pt to see Dr. Ade Zelaya, PT next session.  Continue progressing hip strength, foot neutral position in weight bearing, and knee proprioception    Treatment Last 4 Visits  Treatment Day: 2       5/27/2025 5/29/2025   LE Treatment   Therapeutic Exercise - Pt education was provided on exam findings, treatment  diagnosis, treatment plan, expectations, and prognosis.  - Glute bridge x5 Banded squat, green TB, 3x15, cues for hip ER  Heel raises with ball at ankles, tibialis posterior bias, 3x15   Neuro Re-Education  SL heel tap with visual cues on stairs, UE support as needed, moderate cues for hip ER and arch raise, 3x15   Manual Therapy  Tibiofemoral joint mediali glide, grade IV  PPIVM knee extension with varus stretch, grade IV   Therapeutic Activity  Ambulation training with visual cues using video to decrease step width and encourage foot neutral position   Therapeutic Exercise Minutes 8 10   Neuro Re-Educ Minutes  19   Manual Therapy Minutes  8   Therapeutic Activity Minutes  8   Evaluation Minutes 37    Total Time Of Timed Procedures 8 45   Total Time Of Service-Based Procedures 37 0   Total Treatment Time 45 45   HEP Glute bridge 3x10-12, daily  Banded squat, green TB  Heel raises with ball squeeze a ankles  SL heel tap   Supine bridge        HEP  Banded squat, green TB  Heel raises with ball squeeze a ankles  SL heel tap   Supine bridge    Charges     NMRE 1, TE 1, TA 1, MT 1

## 2025-06-03 ENCOUNTER — APPOINTMENT (OUTPATIENT)
Dept: PHYSICAL THERAPY | Age: 55
End: 2025-06-03
Attending: ORTHOPAEDIC SURGERY
Payer: MEDICAID

## 2025-06-03 ENCOUNTER — TELEPHONE (OUTPATIENT)
Dept: PHYSICAL THERAPY | Facility: HOSPITAL | Age: 55
End: 2025-06-03

## 2025-06-10 ENCOUNTER — OFFICE VISIT (OUTPATIENT)
Dept: PHYSICAL THERAPY | Age: 55
End: 2025-06-10
Attending: ORTHOPAEDIC SURGERY
Payer: MEDICAID

## 2025-06-10 PROCEDURE — 97530 THERAPEUTIC ACTIVITIES: CPT

## 2025-06-10 PROCEDURE — 97112 NEUROMUSCULAR REEDUCATION: CPT

## 2025-06-10 PROCEDURE — 97110 THERAPEUTIC EXERCISES: CPT

## 2025-06-10 NOTE — PROGRESS NOTES
Patient: Keysah Echevarria (55 year old, female) Referring Provider:  Insurance:   Diagnosis: Primary osteoarthritis of left knee (M17.12)  Acquired genu valgum of left knee (M21.062) Frank Joe  [facility] MEDICAID   Date of Surgery: No data recorded Next MD visit:  N/A   Precautions:  None No data recorded Referral Information:    Date of Evaluation: Req: 8, Auth: 8, Exp: 7/26/2025 05/27/25 POC Auth Visits:          Today's Date   6/10/2025    Subjective  no pain in knees, tried walking with narrower step width, but had some back pain after walking 1 hour.  compliant with exercises       Pain: 0/10     Objective  Improved gait quality        Assessment  Performed and progressed exercises focused on hip and foot control for neutral knee position during single limb activity. Pt tolerated therapy well and demonstrated improved gait mechanics post.    Goals (to be met in 8 visits)   Goals: (to be met in 8 visits)     Not Met Progress  Toward Partially  Met Met   Patient will improve LEFS by at least 9 points to demonstrate minimally clinically important difference in patient reported outcome measure for knee pain. []  []  []  []    Patient will improve left hip extension strength by at least 1 MMT grade to improve independence with ADLs and IADLs.  []  []  []  []    Patient will improve hip abduction strength by at least 1 MMT grade to improve independence with ADLs and IADLs.  []  []  []  []    Patient will demonstrate left single leg step down without compensations to improve independence with ADLs and IADLs.  []  []  []  []             Plan  Continue progressing hip strength, foot neutral position in weight bearing, and knee proprioception, likely DC next session    Treatment Last 4 Visits  Treatment Day: 3       5/27/2025 5/29/2025 6/10/2025   LE Treatment   Therapeutic Exercise - Pt education was provided on exam findings, treatment diagnosis, treatment plan, expectations, and prognosis.  -  Glute bridge x5 Banded squat, green TB, 3x15, cues for hip ER  Heel raises with ball at ankles, tibialis posterior bias, 3x15 Banded goblet squat, green TB, #20 KB, 3x8, cues for hip ER   Heel raises with ball at ankles, tibialis posterior bias, 3x15   Education on exercise frequency and dosage   Neuro Re-Education  SL heel tap with visual cues on stairs, UE support as needed, moderate cues for hip ER and arch raise, 3x15 Split squat with neutral knee position, 3x10 each side   Manual Therapy  Tibiofemoral joint mediali glide, grade IV  PPIVM knee extension with varus stretch, grade IV    Therapeutic Activity  Ambulation training with visual cues using video to decrease step width and encourage foot neutral position Ambulation training with cues for increasing push off and foot placement   Therapeutic Exercise Minutes 8 10 23   Neuro Re-Educ Minutes  19 8   Manual Therapy Minutes  8    Therapeutic Activity Minutes  8 8   Evaluation Minutes 37     Total Time Of Timed Procedures 8 45 39   Total Time Of Service-Based Procedures 37 0 0   Total Treatment Time 45 45 39   HEP Glute bridge 3x10-12, daily  Banded squat, green TB  Heel raises with ball squeeze a ankles  SL heel tap   Supine bridge Banded goblet squat, green TB  Heel raises with ball squeeze a ankles  SL heel tap   Supine bridge  Split squat          HEP  Banded goblet squat, green TB  Heel raises with ball squeeze a ankles  SL heel tap   Supine bridge  Split squat      Charges     TA 1, TE 2, NMRE 1

## 2025-06-11 ENCOUNTER — OFFICE VISIT (OUTPATIENT)
Dept: FAMILY MEDICINE CLINIC | Facility: CLINIC | Age: 55
End: 2025-06-11

## 2025-06-11 VITALS
DIASTOLIC BLOOD PRESSURE: 83 MMHG | HEIGHT: 62 IN | WEIGHT: 165 LBS | HEART RATE: 66 BPM | SYSTOLIC BLOOD PRESSURE: 126 MMHG | BODY MASS INDEX: 30.36 KG/M2

## 2025-06-11 DIAGNOSIS — R05.1 ACUTE COUGH: ICD-10-CM

## 2025-06-11 DIAGNOSIS — J02.9 SORE THROAT: Primary | ICD-10-CM

## 2025-06-11 LAB
CONTROL LINE PRESENT WITH A CLEAR BACKGROUND (YES/NO): YES YES/NO
KIT LOT #: NORMAL NUMERIC
STREP GRP A CUL-SCR: NEGATIVE

## 2025-06-11 PROCEDURE — 87880 STREP A ASSAY W/OPTIC: CPT | Performed by: PHYSICIAN ASSISTANT

## 2025-06-11 PROCEDURE — 99213 OFFICE O/P EST LOW 20 MIN: CPT | Performed by: PHYSICIAN ASSISTANT

## 2025-06-11 RX ORDER — BENZONATATE 200 MG/1
200 CAPSULE ORAL 3 TIMES DAILY PRN
Qty: 30 CAPSULE | Refills: 0 | Status: SHIPPED | OUTPATIENT
Start: 2025-06-11

## 2025-06-11 RX ORDER — FLUTICASONE PROPIONATE 50 MCG
SPRAY, SUSPENSION (ML) NASAL
COMMUNITY
Start: 2025-05-16

## 2025-06-11 NOTE — PROGRESS NOTES
HPI:     Sore Throat   This is a new problem. Episode onset: 2 days. The problem has been unchanged. There has been no fever. Associated symptoms include coughing. Pertinent negatives include no abdominal pain, congestion, diarrhea, ear discharge, ear pain, headaches, hoarse voice, shortness of breath or vomiting. She has tried NSAIDs and acetaminophen for the symptoms. The treatment provided mild relief.       Medications:   Current Medications[1]    Allergies:   Allergies[2]    History:     Health Maintenance   Topic Date Due    COVID-19 Vaccine ( season) 2024    Annual Depression Screening  2025    Mammogram  10/02/2025    Annual Physical  12/10/2025    DTaP,Tdap,and Td Vaccines (4 - Td or Tdap) 2028    Colorectal Cancer Screening  2030    Influenza Vaccine  Completed    Pneumococcal Vaccine: 50+ Years  Completed    Zoster Vaccines  Completed    Meningococcal B Vaccine  Aged Out       No LMP recorded. Patient has had a hysterectomy.   Past Medical History:   Past Medical History[3]    Past Surgical History:   Past Surgical History[4]    Family History:   Family History[5]    Social History:     Social History     Socioeconomic History    Marital status:      Spouse name: Not on file    Number of children: Not on file    Years of education: Not on file    Highest education level: Not on file   Occupational History    Not on file   Tobacco Use    Smoking status: Former     Current packs/day: 0.00     Types: Cigarettes     Quit date: 1/3/2023     Years since quittin.4    Smokeless tobacco: Never   Vaping Use    Vaping status: Never Used   Substance and Sexual Activity    Alcohol use: Yes     Comment: socially, 1 drink/day    Drug use: No    Sexual activity: Not on file   Other Topics Concern    Grew up on a farm No    History of tanning Yes    Outdoor occupation No    Breast feeding No    Reaction to local anesthetic No    Pt has a pacemaker No    Pt has a defibrillator  No   Social History Narrative    Not on file     Social Drivers of Health     Food Insecurity: Not on file   Transportation Needs: Not on file   Stress: Not on file   Housing Stability: Not on file       Review of Systems:   Review of Systems   Constitutional:  Negative for activity change, chills, fatigue and fever.   HENT:  Negative for congestion, ear discharge, ear pain, hoarse voice, postnasal drip, rhinorrhea, sinus pressure, sinus pain and sore throat.    Respiratory:  Positive for cough. Negative for chest tightness, shortness of breath and wheezing.    Cardiovascular:  Negative for chest pain and palpitations.   Gastrointestinal:  Negative for abdominal distention, abdominal pain, blood in stool, constipation, diarrhea, nausea and vomiting.   Skin:  Negative for rash.   Neurological:  Negative for headaches.        Vitals:    06/11/25 1334   BP: 126/83   Pulse: 66   Weight: 165 lb (74.8 kg)   Height: 5' 2\" (1.575 m)     Body mass index is 30.18 kg/m².    Physical Exam:   Physical Exam  Vitals reviewed.   Constitutional:       General: She is not in acute distress.     Appearance: She is well-developed.   HENT:      Head: Normocephalic and atraumatic.      Right Ear: Tympanic membrane, ear canal and external ear normal. There is no impacted cerumen.      Left Ear: Tympanic membrane, ear canal and external ear normal. There is no impacted cerumen.      Nose: Nose normal.      Mouth/Throat:      Mouth: Mucous membranes are moist.      Pharynx: Oropharynx is clear. No oropharyngeal exudate or posterior oropharyngeal erythema.   Eyes:      General:         Right eye: No discharge.         Left eye: No discharge.      Conjunctiva/sclera: Conjunctivae normal.   Cardiovascular:      Rate and Rhythm: Normal rate and regular rhythm.      Heart sounds: Normal heart sounds, S1 normal and S2 normal. No murmur heard.  Pulmonary:      Effort: Pulmonary effort is normal.      Breath sounds: Normal breath sounds. No  wheezing or rales.   Chest:      Chest wall: No tenderness.   Lymphadenopathy:      Cervical: No cervical adenopathy.   Skin:     Findings: No rash.   Neurological:      Mental Status: She is alert and oriented to person, place, and time.   Psychiatric:         Behavior: Behavior is cooperative.          Assessment and Plan::     Assessment & Plan  Sore throat    Orders:    Strep A Assay W/Optic  Supportive care includes:    encourage fluid intake   Advise patient to take Tylenol/Ibuprofen as needed for pain.  warm salt water gargles   humidifier     Acute cough    Orders:    Benzonatate; Take 1 capsule (200 mg total) by mouth 3 (three) times daily as needed for cough.  Dispense: 30 capsule; Refill: 0       Discussed plan of care with pt and pt is in agreement.All questions answered. Pt to call with questions or concerns.         [1]   Current Outpatient Medications   Medication Sig Dispense Refill    fluticasone propionate 50 MCG/ACT Nasal Suspension       benzonatate 200 MG Oral Cap Take 1 capsule (200 mg total) by mouth 3 (three) times daily as needed for cough. 30 capsule 0    triamcinolone 0.1 % External Cream Apply 1 Application topically 2 (two) times daily as needed. For itchy rash . 15 g 0    levothyroxine 50 MCG Oral Tab Take 1 tablet (50 mcg total) by mouth before breakfast. 90 tablet 3    Melatonin 3 MG Oral Cap Take by mouth.      cyclobenzaprine 5 MG Oral Tab Take 1 tablet (5 mg total) by mouth nightly as needed for Muscle spasms. 30 tablet 0    Phentermine HCl 15 MG Oral Cap Take 1 capsule (15 mg total) by mouth every morning. For weight loss 90 capsule 0    hydrocortisone 2.5 % External Ointment Apply to the affected areas on face up to twice daily Monday-Friday with flares. Use for up to 4 weeks 28 g 2    cetirizine 10 MG Oral Tab Take 1 tablet (10 mg total) by mouth daily. 30 tablet 0    ibuprofen 600 MG Oral Tab Take 1 tablet (600 mg total) by mouth every 6 (six) hours as needed for Pain. Always  take it with food. 60 tablet 0    valACYclovir 1 G Oral Tab Take 2 tablets (2,000 mg total) by mouth every 12 (twelve) hours. For a total of 2 doses. 8 tablet 3    ketoconazole 2 % External Shampoo Apply to scalp and behind the ears 2-3 times per week as needed. 120 mL 2   [2]   Allergies  Allergen Reactions    Penicillins OTHER (SEE COMMENTS) and UNKNOWN     Since a child   [3]   Past Medical History:   Anxiety state    Depression    Obesity   [4]   Past Surgical History:  Procedure Laterality Date    Colonoscopy N/A 2020    Procedure: COLONOSCOPY;  Surgeon: Ignacio Wolf MD;  Location: [facility] ENDOSCOPY    Hysterectomy      [facility] for heavy menses, anemia    Lipoma removal Left     Lipoma removal of left flank    Needle biopsy left  05/15/2019     bx    Needle biopsy right  2017    benign outside breast bx natalia    Other surgical history  2017    right breast biopsy    [5]   Family History  Problem Relation Age of Onset    Hypertension Mother     Breast Cancer Maternal Aunt 50         of complications of breast CA    Breast Cancer Maternal Aunt 70    Ovarian Cancer Neg     Uterine Cancer Neg     Colon Cancer Neg     Diabetes Neg     Glaucoma Neg

## 2025-06-11 NOTE — ASSESSMENT & PLAN NOTE
Orders:    Benzonatate; Take 1 capsule (200 mg total) by mouth 3 (three) times daily as needed for cough.  Dispense: 30 capsule; Refill: 0

## 2025-06-13 ENCOUNTER — OFFICE VISIT (OUTPATIENT)
Dept: ORTHOPEDICS CLINIC | Facility: CLINIC | Age: 55
End: 2025-06-13
Payer: MEDICAID

## 2025-06-13 DIAGNOSIS — M17.12 PRIMARY OSTEOARTHRITIS OF LEFT KNEE: Primary | ICD-10-CM

## 2025-06-13 DIAGNOSIS — M21.062 ACQUIRED GENU VALGUM OF LEFT KNEE: ICD-10-CM

## 2025-06-13 PROCEDURE — 99214 OFFICE O/P EST MOD 30 MIN: CPT | Performed by: ORTHOPAEDIC SURGERY

## 2025-06-13 NOTE — PROGRESS NOTES
Chief Complaint   Patient presents with    Knee Pain     Left knee f/u - Pt states PT helped with pain. States cracking and swelling, no pain.        HPI  Keysha Darlene Echevarria is a 55 year old female being seen in the office today for follow up of left knee.  She has 1 more session of physical therapy.  She notes improvement in her pain.  She still has swelling.  Denies any mechanical symptoms or instability symptoms.  Denies any numbness or tingling.  She does note some crepitus still.         The following portions of the patient's history were reviewed [unfilled] and updated as appropriate: allergies, current medications, past family history, past medical history, past social history, past surgical history and problem list.      Physical Exam  There were no vitals taken for this visit.  There is no height or weight on file to calculate BMI.  Ortho Exam  Left knee exam demonstrates no skin issues.  She has a mild to moderate effusion.  She has mild valgus alignment, fully correctable.  She is tenderness palpation over the lateral joint line.  Range of motion is from 0-125.  Ligament exam is stable.  Patella tracks centrally.  Distally she is neurovascularly intact.  She walks with a nonantalgic gait today.    Imaging:  None new    Assessment and Plan  Assessment & Plan  Primary osteoarthritis of left knee         Acquired genu valgum of left knee           I had a lengthy discussion with the patient today about the patient's knee OA.  I discussed her swelling.  Unfortunately, physical therapy will likely not help this problem.  She can try an anti-inflammatory or a cortisone injection which will give her temporary relief with her swelling in the knee.  Ultimately her swelling will go away after her knee replacement.  I discussed that the swelling is generally not harmful, but may cause pain.  She would like to hold off on any injections at this time.  Specifically, I recommend the following:    Rest, ice,  compression, elevation, anti-inflammatories/Tylenol for symptoms.  Continue home exercise program  Activities: As tolerated  Follow up: As needed with me  The patient understands and agrees with this plan. All of the patient's questions were answered today.       No follow-ups on file.    [unfilled]    Frank Joe MD

## 2025-06-23 ENCOUNTER — APPOINTMENT (OUTPATIENT)
Dept: PHYSICAL THERAPY | Age: 55
End: 2025-06-23
Attending: ORTHOPAEDIC SURGERY
Payer: MEDICAID

## 2025-07-09 ENCOUNTER — APPOINTMENT (OUTPATIENT)
Dept: PHYSICAL THERAPY | Age: 55
End: 2025-07-09
Attending: ORTHOPAEDIC SURGERY

## 2025-07-18 ENCOUNTER — TELEPHONE (OUTPATIENT)
Facility: CLINIC | Age: 55
End: 2025-07-18

## 2025-07-18 NOTE — TELEPHONE ENCOUNTER
Spoke with patient and she states she was recommended surgery by Dr Swift in sept 2024 but she was scared to have the surgery so she cancelled. Patient states she continues to have pain and swelling in the 2nd and 3rd toes after walking and the 3rd toe is staring to lap over the 4th toe. She did convey that she is not ready for surgery at this time but  wants to be seen to review other options. Patient is scheduled with Meera on 7/25

## 2025-07-18 NOTE — TELEPHONE ENCOUNTER
Earlier Appt needed for rt foot pain, please f/u with pt [phone]. Pt has appt scheduled with Troy but she wants to see Block.

## 2025-07-21 NOTE — TELEPHONE ENCOUNTER
Yes I think Promise would be great to see to get some non surgical options, otherwise can reschedule with me if she would like as well.

## 2025-07-21 NOTE — TELEPHONE ENCOUNTER
Spoke with patient and she would like to keep appointment with Meera on 7/25 to discuss non surgical options, but she would also like to book appointment in September with Dr Swift for follow up. Appointment scheduled on 9/17 at 9am with Dr Swift. She had no further questions.

## 2025-07-25 ENCOUNTER — OFFICE VISIT (OUTPATIENT)
Dept: PODIATRY CLINIC | Facility: CLINIC | Age: 55
End: 2025-07-25
Payer: MEDICAID

## 2025-07-25 DIAGNOSIS — M20.11 HALLUX VALGUS, RIGHT: Primary | ICD-10-CM

## 2025-07-25 DIAGNOSIS — M21.621 TAILOR'S BUNIONETTE, RIGHT: ICD-10-CM

## 2025-07-25 DIAGNOSIS — M20.41 HAMMER TOE OF RIGHT FOOT: ICD-10-CM

## 2025-07-25 PROCEDURE — 99213 OFFICE O/P EST LOW 20 MIN: CPT

## 2025-07-25 NOTE — PROGRESS NOTES
[facility]  Progress Note      Keysha Echevarria is a 55 year old female.   Chief Complaint   Patient presents with    Foot Pain     Right foot- patient states her pain is less but when she steps she feels like there is a bone poking down- rates pain 4/10 - when patient elevates her foot she doesn't feel the poking anymore.          HPI:     The following individual(s) verbally consented to be recorded using ambient AI listening technology and understand that they can each withdraw their consent to this listening technology at any point by asking the clinician to turn off or pause the recording:      History of Present Illness  Keysha Echevarria is a 55 year old female who presents with right foot pain and worsening hammer toes.    She was scheduled for surgery in February for a bunion, Alexandrea's bunion, and hammer toe correction but canceled due to lack of pain at that time and family work conflicts. Currently, she experiences significant pain in her right foot, particularly affecting the first, second, and third toes, and underneath the foot. The pain is exacerbated by walking, especially barefoot, and is described as a pressure sensation when the toes are moved or when walking without shoes.    She has attempted to manage the pain by purchasing new shoes recommended after an assessment of her walking pattern. These shoes have provided some relief, reducing the pain but not eliminating it. The pain is less severe when wearing these supportive shoes compared to walking barefoot. Today patient is ambulatory in very rigid hard sole slides.    Her toes are moving more and . She experiences pain when the toes are curving, and the toes are described as crossing over each other, which is worsening over time.    Her  works in construction and is currently busy, which has delayed her ability to consider surgery as he would need to assist her during recovery. She plans to  consider surgery in the fall or winter when he is less busy.      Allergies: Penicillins   Current Medications[1]   Past Medical History[2]   Past Surgical History[3]   Family History[4]   Social Hx on file[5]        REVIEW OF SYSTEMS:     10 point ROS completed and was negative unless stated in HPI.      EXAM:   GENERAL: well developed, well nourished, in no apparent distress  EXTREMITIES:  1. Integument:  Skin appears moist, warm, and supple. There are no color changes. No open lesions. No macerations. No Hyperkeratotic lesions.   2. Vascular: Dorsalis pedis 2/4 bilateral and posterior tibial pulses 2/4 bilateral, capillary refill normal.  3. Neurological: Gross sensation intact via light touch bilaterally.  Normal sharp/dull sensation  4. Musculoskeletal: All muscle groups are graded 5/5 in the foot and ankle. Patient has hammer toes to lesser digits bilaterally. Right foot crossover deformity of digits 4/5. Patient has a moderate bunion deformity to right foot 1st MTP, also tailor's bunion to fifth MTP joint. No pain to bunions at today's visit. Patient has pain under the right foot second met head on palpation. No pain noted to interspaces at today's visit.        ASSESSMENT AND PLAN:   Diagnoses and all orders for this visit:    Hallux valgus, right    Tailor's bunionette, right    Hammer toe of right foot      Plan:   -Patient was seen and evaluated today in clinic.  Chart history reviewed.    Assessment & Plan  Hammer toes  Chronic hammer toes with worsening separation and curvature on the right foot, causing increased pain. Deformity exacerbated by previous footwear choices and natural foot curvature. Surgery not feasible due to 's schedule.  - Provide crest pads for hammering of the toes.  - Provide toe spacers to prevent further curvature and separation to be used between digits 4/5.  - Advise wearing supportive shoes with good arch support, such as New Balance or Kaur.  - Recommend Oofos clogs  or sandals for home use for cushioning and support.    Foot pain  Chronic foot pain in the first, second, and third toes of the right foot, exacerbated by walking. Pain due to pressure on metatarsal heads and stress on foot's natural curvature.  - Provide metatarsal pads to alleviate pressure on metatarsal heads.  - Advise against walking barefoot.  - Recommend wearing supportive shoes with good arch support, such as New Balance or Kaur.    Bunion  Chronic bunion on the right foot contributing to pain and deformity, exacerbated by previous footwear choices. Surgery not feasible currently.  - Discuss potential surgical options with Dr. Swift when feasible.  - Advise wearing supportive shoes with good arch support.      -All of the patient's questions and concerns were addressed.  They indicated their understanding of these issues and agrees to the plan.    Time spent reviewing pertinent information from patient's chart, reviewing any pertinent imaging, obtaining history and physical exam, discussing and mutually agreeing on a treatment plan, and documenting encounter: 20 minutes    RTC as needed; patient has upcoming appointment with Dr. Swift to discuss surgery    MIKE Pratt        [facility]            Dragon speech recognition software was used to prepare this note.  Errors in word recognition may occur.  Please contact me with any questions/concerns with this note.        [1]   Current Outpatient Medications   Medication Sig Dispense Refill    fluticasone propionate 50 MCG/ACT Nasal Suspension       benzonatate 200 MG Oral Cap Take 1 capsule (200 mg total) by mouth 3 (three) times daily as needed for cough. 30 capsule 0    triamcinolone 0.1 % External Cream Apply 1 Application topically 2 (two) times daily as needed. For itchy rash . 15 g 0    levothyroxine 50 MCG Oral Tab Take 1 tablet (50 mcg total) by mouth before breakfast. 90 tablet 3    Melatonin 3 MG Oral Cap Take by  mouth.      cyclobenzaprine 5 MG Oral Tab Take 1 tablet (5 mg total) by mouth nightly as needed for Muscle spasms. 30 tablet 0    Phentermine HCl 15 MG Oral Cap Take 1 capsule (15 mg total) by mouth every morning. For weight loss 90 capsule 0    hydrocortisone 2.5 % External Ointment Apply to the affected areas on face up to twice daily Monday-Friday with flares. Use for up to 4 weeks 28 g 2    cetirizine 10 MG Oral Tab Take 1 tablet (10 mg total) by mouth daily. 30 tablet 0    ibuprofen 600 MG Oral Tab Take 1 tablet (600 mg total) by mouth every 6 (six) hours as needed for Pain. Always take it with food. 60 tablet 0    valACYclovir 1 G Oral Tab Take 2 tablets (2,000 mg total) by mouth every 12 (twelve) hours. For a total of 2 doses. 8 tablet 3    ketoconazole 2 % External Shampoo Apply to scalp and behind the ears 2-3 times per week as needed. 120 mL 2   [2]   Past Medical History:   Anxiety state    Depression    Obesity   [3]   Past Surgical History:  Procedure Laterality Date    Colonoscopy N/A 2020    Procedure: COLONOSCOPY;  Surgeon: Ignacio Wolf MD;  Location: [facility] ENDOSCOPY    Hysterectomy      [facility] for heavy menses, anemia    Lipoma removal Left     Lipoma removal of left flank    Needle biopsy left  05/15/2019    US bx    Needle biopsy right  2017    benign outside breast bx natalia    Other surgical history  2017    right breast biopsy    [4]   Family History  Problem Relation Age of Onset    Hypertension Mother     Breast Cancer Maternal Aunt 50         of complications of breast CA    Breast Cancer Maternal Aunt 70    Ovarian Cancer Neg     Uterine Cancer Neg     Colon Cancer Neg     Diabetes Neg     Glaucoma Neg    [5]   Social History  Socioeconomic History    Marital status:    Tobacco Use    Smoking status: Former     Current packs/day: 0.00     Types: Cigarettes     Quit date: 1/3/2023     Years since quittin.5    Smokeless tobacco: Never   Vaping Use    Vaping  status: Never Used   Substance and Sexual Activity    Alcohol use: Yes     Comment: socially, 1 drink/day    Drug use: No   Other Topics Concern    Grew up on a farm No    History of tanning Yes    Outdoor occupation No    Breast feeding No    Reaction to local anesthetic No    Pt has a pacemaker No    Pt has a defibrillator No

## 2025-08-08 DIAGNOSIS — E66.09 NON MORBID OBESITY DUE TO EXCESS CALORIES: ICD-10-CM

## 2025-08-12 RX ORDER — PHENTERMINE HYDROCHLORIDE 15 MG/1
15 CAPSULE ORAL EVERY MORNING
Qty: 90 CAPSULE | Refills: 0 | Status: SHIPPED | OUTPATIENT
Start: 2025-08-12

## (undated) DEVICE — LINE MNTR ADLT SET O2 INTMD

## (undated) DEVICE — MEDI-VAC NON-CONDUCTIVE SUCTION TUBING 6MM X 1.8M (6FT.) L: Brand: CARDINAL HEALTH

## (undated) DEVICE — 6 ML SYRINGE LUER-LOCK TIP: Brand: MONOJECT

## (undated) DEVICE — 35 ML SYRINGE REGULAR TIP: Brand: MONOJECT

## (undated) DEVICE — 3 ML SYRINGE LUER-LOCK TIP: Brand: MONOJECT

## (undated) DEVICE — Device: Brand: DEFENDO AIR/WATER/SUCTION AND BIOPSY VALVE

## (undated) DEVICE — STERILE LATEX POWDER-FREE SURGICAL GLOVESWITH NITRILE COATING: Brand: PROTEXIS

## (undated) DEVICE — Device: Brand: CUSTOM PROCEDURE KIT

## (undated) DEVICE — FORCEP RADIAL JAW 4

## (undated) NOTE — LETTER
AUTHORIZATION FOR SURGICAL OPERATION OR OTHER PROCEDURE    1. I hereby authorize Dr. Vadim Swift , and [facility] staff assigned to my case to perform the following operation and/or procedure at the [facility] site:    _______________________________________________________________________________________________    Cortisone Injection Right Foot   _______________________________________________________________________________________________    2.  My physician has explained the nature and purpose of the operation or other procedure, possible alternative methods of treatment, the risks involved, and the possibility of complication to me.  I acknowledge that no guarantee has been made as to the result that may be obtained.  3.  I recognize that, during the course of this operation, or other procedure, unforseen conditions may necessitate additional or different procedure than those listed above.  I, therefore, further authorize and request that the above named physician, his/her physician assistants or designees perform such procedures as are, in his/her professional opinion, necessary and desirable.  4.  Any tissue or organs removed in the operation or other procedure may be disposed of by and at the discretion of the [facility] and [facility].  5.  I understand that in the event of a medical emergency, I will be transported by local paramedics to [facility] or other hospital emergency department.  6.  I certify that I have read and fully understand the above consent to operation and/or other procedure.    7.  I acknowledge that my physician has explained sedation/analgesia administration to me including the risks and benefits.  I consent to the administration of sedation/analgesia as may be necessary or desirable in the judgement of my physician.    Witness signature: ___________________________________________________ Date:   ______/______/_____                    Time:  ________ A.M.  P.M.       Patient Name:  ______________________________________________________  (please print)      Patient signature:  ___________________________________________________             Relationship to Patient:           []  Parent    Responsible person                          []  Spouse  In case of minor or                    [] Other  _____________   Incompetent name:  __________________________________________________                               (please print)      _____________      Responsible person  In case of minor or  Incompetent signature:  _______________________________________________    Statement of Physician  My signature below affirms that prior to the time of the procedure, I have explained to the patient and/or his/her guardian, the risks and benefits involved in the proposed treatment and any reasonable alternative to the proposed treatment.  I have also explained the risks and benefits involved in the refusal of the proposed treatment and have answered the patient's questions.                        Date:  ______/______/_______  Provider                      Signature:  __________________________________________________________       Time:  ___________ A.M    P.M.

## (undated) NOTE — LETTER
[address] [place] [address]  Authorization for Surgical Operation or Procedure  Date: ______________       Time: _______________  1.  I hereby authorize Dr. Shreyas Arellano my physician and the assistant, to perform the following operation and/or 5. I consent to the photographing of the operations or procedures to be performed for the purposes of advancing medicine, science, and/or education, provided my identity is not revealed.  If the procedure has been videotaped, the physician/surgeon will obta risks and benefits involved in the proposed treatment and any reasonable alternative to the proposed treatment. I have also explained the risks and benefits involved in the refusal of the proposed treatment and have answered the patient's questions.  If I h

## (undated) NOTE — LETTER
Date & Time: 2/13/2023, 1:11 PM  Patient: Hunter Whitman  Encounter Provider(s):    MIKE Hernández Aas       To Whom It May Concern:    Kelly Lion was seen and treated in our department on 2/13/2023. She should not return to work until 2/17/2023 if fever free for 24 hours without medication use.     If you have any questions or concerns, please do not hesitate to call.        _____________________________  Physician/APC Signature

## (undated) NOTE — LETTER
8/31/2017              Keysha Villa        [address] [place] [address] Osteopathy        [place] [address]         Dear Ilir England,      It was a pleasure to see you at our [address]  office.   Your lab tests were normal.  Th

## (undated) NOTE — LETTER
September 19, 2017         Jhoan Hamilton DO  [address] [facility] [address]      Patient: Keren Jeronimoubias   YOB: 1970   Date of Visit: 9/19/2017       Dear Dr. Georgiana Davis DO,    I saw your patient, Elva Peterson

## (undated) NOTE — LETTER
1/8/2024              Keysha Echevarria        [address]        [facility] [address]         To whom it may concern,    Keysha Echevarria is currently a patient under my medical care.  The patient's medical condition makes serving on jury duty inadvisable at this time due to testing Positive for Covid.  Please excuse them from serving.  If you require additional information please do not hesitate to contact my office.        Sincerely,    MIKE Wasserman  [address] [address] [phone]          Document electronically generated by:  MIKE Wasserman

## (undated) NOTE — LETTER
Mily Saba, Do  [address]  [address] [facility] [address]  [address] [facility], [address] [facility]       08/31/17        Patient: Ronda Paredes Allen   YOB: 1970   Date of Visit: 8/31/2017       Dear  Dr. Nathalia Andrade,      Thank you for referring Jose Antonio Cornejo

## (undated) NOTE — LETTER
95/87/01        [address]      Dear Edouard Merritt,    [facility] records indicate that you have outstanding lab work and or testing that was ordered for you and has not yet been completed:  Orders Placed This

## (undated) NOTE — LETTER
Patient Name: Marbella Lizama  : 1970  MRN: [id number]  Patient Address: [place] [address] Disease 2019 (COVID-19)     Emil Means is committed to the safety and well-being of our pat symptoms carefully. If your symptoms get worse, call your healthcare provider immediately. 3. Get rest and stay hydrated. 4. If you have a medical appointment, call the healthcare provider ahead of time and tell them that you have or may have COVID-19. without the use of fever-reducing medications; and  · Improvement in respiratory symptoms (e.g., cough, shortness of breath); and  · At least 10 days have passed since symptoms first appeared OR if asymptomatic patient or date of symptom onset is unclear t convalescent plasma donors must:    · Have had a confirmed diagnosis of COVID-19  · Be symptom-free for at least 14 days*    *Some people will be required to have a repeat COVID-19 test in order to be eligible to donate.  If you’re instructed by Emmanuel bowers factors. How do I prevent PASC?   The best way to prevent the long-term symptoms of COVID-19 is by getting the COVID 19 vaccine as soon as it is available to you, wear a mask in public, avoid large gatherings, wash your hands frequently, and stay at least

## (undated) NOTE — LETTER
May 17, 8848     [facility]      Dear Jackie Parikh:    Below are the results from your recent visit: Pathology of the left breast tissue was benign showing no cancer.  If this area becomes bothersom and placed in fresh formalin at this time. The specimen was removed from formalin for further processing at 9:30 p.m.  (al)    David Guido M.D./arnol          Interpretation Benign       If you have any questions or concerns, please don't hesitate to call